# Patient Record
Sex: FEMALE | Race: WHITE | Employment: FULL TIME | URBAN - NONMETROPOLITAN AREA
[De-identification: names, ages, dates, MRNs, and addresses within clinical notes are randomized per-mention and may not be internally consistent; named-entity substitution may affect disease eponyms.]

---

## 2017-02-27 RX ORDER — MONTELUKAST SODIUM 10 MG/1
TABLET ORAL
Qty: 30 TABLET | Refills: 5 | Status: SHIPPED | OUTPATIENT
Start: 2017-02-27 | End: 2017-09-14 | Stop reason: SDUPTHER

## 2017-04-11 ENCOUNTER — HOSPITAL ENCOUNTER (OUTPATIENT)
Dept: LAB | Age: 47
Discharge: HOME | End: 2017-04-11
Attending: INTERNAL MEDICINE | Admitting: INTERNAL MEDICINE

## 2017-04-11 ENCOUNTER — OFFICE VISIT (OUTPATIENT)
Dept: INTERNAL MEDICINE CLINIC | Age: 47
End: 2017-04-11

## 2017-04-11 VITALS
HEIGHT: 65 IN | RESPIRATION RATE: 16 BRPM | WEIGHT: 151.8 LBS | SYSTOLIC BLOOD PRESSURE: 138 MMHG | BODY MASS INDEX: 25.29 KG/M2 | TEMPERATURE: 98.1 F | OXYGEN SATURATION: 99 % | HEART RATE: 68 BPM | DIASTOLIC BLOOD PRESSURE: 88 MMHG

## 2017-04-11 DIAGNOSIS — F41.9 ANXIETY: ICD-10-CM

## 2017-04-11 DIAGNOSIS — J45.991 COUGH VARIANT ASTHMA: Primary | ICD-10-CM

## 2017-04-11 DIAGNOSIS — C73 THYROID CA (HCC): ICD-10-CM

## 2017-04-11 LAB
ANION GAP SERPL CALCULATED.3IONS-SCNC: 14 MMOL/L (ref 4–16)
BASOPHILS ABSOLUTE: 0 K/CU MM
BASOPHILS RELATIVE PERCENT: 0.5 % (ref 0–1)
BUN BLDV-MCNC: 15 MG/DL (ref 6–23)
CALCIUM SERPL-MCNC: 8.9 MG/DL (ref 8.3–10.6)
CHLORIDE BLD-SCNC: 102 MMOL/L (ref 99–110)
CO2: 23 MMOL/L (ref 21–32)
CREAT SERPL-MCNC: 0.9 MG/DL (ref 0.6–1.1)
DIFFERENTIAL TYPE: ABNORMAL
EOSINOPHILS ABSOLUTE: 0.1 K/CU MM
EOSINOPHILS RELATIVE PERCENT: 1.6 % (ref 0–3)
GFR AFRICAN AMERICAN: >60 ML/MIN/1.73M2
GFR NON-AFRICAN AMERICAN: >60 ML/MIN/1.73M2
GLUCOSE BLD-MCNC: 105 MG/DL (ref 70–140)
HCT VFR BLD CALC: 40.5 % (ref 37–47)
HEMOGLOBIN: 13.9 GM/DL (ref 12.5–16)
IMMATURE NEUTROPHIL %: 0.5 % (ref 0–0.43)
LYMPHOCYTES ABSOLUTE: 1.1 K/CU MM
LYMPHOCYTES RELATIVE PERCENT: 25.1 % (ref 24–44)
MCH RBC QN AUTO: 32.3 PG (ref 27–31)
MCHC RBC AUTO-ENTMCNC: 34.3 % (ref 32–36)
MCV RBC AUTO: 94.2 FL (ref 78–100)
MONOCYTES ABSOLUTE: 0.4 K/CU MM
MONOCYTES RELATIVE PERCENT: 9.8 % (ref 0–4)
PDW BLD-RTO: 12.2 % (ref 11.7–14.9)
PLATELET # BLD: 161 K/CU MM (ref 140–440)
PMV BLD AUTO: 9.9 FL (ref 7.5–11.1)
POTASSIUM SERPL-SCNC: 4.1 MMOL/L (ref 3.5–5.1)
RBC # BLD: 4.3 M/CU MM (ref 4.2–5.4)
SEGMENTED NEUTROPHILS ABSOLUTE COUNT: 2.7 K/CU MM
SEGMENTED NEUTROPHILS RELATIVE PERCENT: 62.5 % (ref 36–66)
SODIUM BLD-SCNC: 139 MMOL/L (ref 135–145)
TOTAL IMMATURE NEUTOROPHIL: 0.02 K/CU MM
WBC # BLD: 4.4 K/CU MM (ref 4–10.5)

## 2017-04-11 PROCEDURE — 99213 OFFICE O/P EST LOW 20 MIN: CPT | Performed by: INTERNAL MEDICINE

## 2017-04-11 ASSESSMENT — PATIENT HEALTH QUESTIONNAIRE - PHQ9
SUM OF ALL RESPONSES TO PHQ QUESTIONS 1-9: 0
SUM OF ALL RESPONSES TO PHQ9 QUESTIONS 1 & 2: 0
1. LITTLE INTEREST OR PLEASURE IN DOING THINGS: 0
2. FEELING DOWN, DEPRESSED OR HOPELESS: 0

## 2017-04-11 ASSESSMENT — ENCOUNTER SYMPTOMS
RESPIRATORY NEGATIVE: 1
HEARTBURN: 0
GASTROINTESTINAL NEGATIVE: 1
EYES NEGATIVE: 1
NAUSEA: 0

## 2017-07-12 ENCOUNTER — OFFICE VISIT (OUTPATIENT)
Dept: INTERNAL MEDICINE CLINIC | Age: 47
End: 2017-07-12

## 2017-07-12 VITALS
DIASTOLIC BLOOD PRESSURE: 74 MMHG | HEIGHT: 65 IN | RESPIRATION RATE: 15 BRPM | HEART RATE: 84 BPM | TEMPERATURE: 98.3 F | OXYGEN SATURATION: 98 % | WEIGHT: 149.8 LBS | SYSTOLIC BLOOD PRESSURE: 124 MMHG | BODY MASS INDEX: 24.96 KG/M2

## 2017-07-12 DIAGNOSIS — C73 THYROID CA (HCC): ICD-10-CM

## 2017-07-12 DIAGNOSIS — N92.6 IRREGULAR MENSES: ICD-10-CM

## 2017-07-12 DIAGNOSIS — J45.991 COUGH VARIANT ASTHMA: Primary | ICD-10-CM

## 2017-07-12 PROCEDURE — 99213 OFFICE O/P EST LOW 20 MIN: CPT | Performed by: INTERNAL MEDICINE

## 2017-08-08 ASSESSMENT — ENCOUNTER SYMPTOMS
EYES NEGATIVE: 1
GASTROINTESTINAL NEGATIVE: 1

## 2017-09-14 RX ORDER — MONTELUKAST SODIUM 10 MG/1
TABLET ORAL
Qty: 30 TABLET | Refills: 5 | Status: SHIPPED | OUTPATIENT
Start: 2017-09-14 | End: 2018-03-04 | Stop reason: SDUPTHER

## 2017-10-27 ENCOUNTER — HOSPITAL ENCOUNTER (OUTPATIENT)
Dept: LAB | Age: 47
Discharge: HOME | End: 2017-10-27
Attending: INTERNAL MEDICINE | Admitting: INTERNAL MEDICINE

## 2017-10-27 LAB
T4 FREE: 1.69 NG/DL (ref 0.9–1.8)
TSH HIGH SENSITIVITY: 1.04 UIU/ML (ref 0.27–4.2)

## 2017-10-28 LAB
ANTITHYROGLOBULIN AB: <0.9
THYROGLOBULIN BY LC-MS/MS, SERUM/PLASMA: ABNORMAL
THYROGLOBULIN: 0.1

## 2018-01-11 ENCOUNTER — OFFICE VISIT (OUTPATIENT)
Dept: INTERNAL MEDICINE CLINIC | Age: 48
End: 2018-01-11

## 2018-01-11 VITALS
OXYGEN SATURATION: 98 % | RESPIRATION RATE: 16 BRPM | DIASTOLIC BLOOD PRESSURE: 100 MMHG | TEMPERATURE: 98 F | SYSTOLIC BLOOD PRESSURE: 144 MMHG | WEIGHT: 158.4 LBS | BODY MASS INDEX: 26.36 KG/M2 | HEART RATE: 78 BPM

## 2018-01-11 DIAGNOSIS — F32.A DEPRESSION, UNSPECIFIED DEPRESSION TYPE: ICD-10-CM

## 2018-01-11 DIAGNOSIS — J45.991 COUGH VARIANT ASTHMA: Primary | ICD-10-CM

## 2018-01-11 DIAGNOSIS — C73 THYROID CA (HCC): ICD-10-CM

## 2018-01-11 DIAGNOSIS — R03.0 ELEVATED BP WITHOUT DIAGNOSIS OF HYPERTENSION: ICD-10-CM

## 2018-01-11 PROCEDURE — 99213 OFFICE O/P EST LOW 20 MIN: CPT | Performed by: INTERNAL MEDICINE

## 2018-01-11 RX ORDER — PAROXETINE 10 MG/1
10 TABLET, FILM COATED ORAL DAILY
Qty: 30 TABLET | Refills: 1 | Status: SHIPPED | OUTPATIENT
Start: 2018-01-11 | End: 2018-03-23

## 2018-01-11 ASSESSMENT — ENCOUNTER SYMPTOMS
COUGH: 0
SHORTNESS OF BREATH: 0
EYES NEGATIVE: 1
GASTROINTESTINAL NEGATIVE: 1
HEARTBURN: 0
RESPIRATORY NEGATIVE: 1
VOMITING: 0
NAUSEA: 0

## 2018-01-11 NOTE — PROGRESS NOTES
Jere Jeans  Patient's  is 1970  Seen in office on 2018      SUBJECTIVE:  Yanelis Orta is a 49 y. o.year old female presents today   Chief Complaint   Patient presents with    6 Month Follow-Up     asthma    Migraine      had 4 this week     Pt is here for f/u of asthma  Pt states her breathing is good. Pt states her father  in   Since then she is emotional.  She had increasing headache. She had migraine headache when she was pregnant, when her step mother  and now since his dad passed away. She had bronchitis recently and was given steroid by  last Wednesday. Pt has seen endocrinologist and thyroid test are normal  Pt has grief and having crying spell. Taking medications regularly. No side effects noted. Review of Systems   Constitutional: Negative. Negative for chills, fever and weight loss. HENT: Negative. Eyes: Negative. Respiratory: Negative. Negative for cough and shortness of breath. Cardiovascular: Negative. Negative for chest pain, palpitations and leg swelling. Gastrointestinal: Negative. Negative for heartburn, nausea and vomiting. Genitourinary: Negative. Negative for dysuria and hematuria. Musculoskeletal: Negative. Skin: Negative. Neurological: Negative. Endo/Heme/Allergies: Negative. Psychiatric/Behavioral: Negative. OBJECTIVE: /80   Pulse 78   Temp 98 °F (36.7 °C)   Resp 16   Wt 158 lb 6.4 oz (71.8 kg)   LMP 2018   SpO2 98%   Breastfeeding? No   BMI 26.36 kg/m²     Wt Readings from Last 3 Encounters:   18 158 lb 6.4 oz (71.8 kg)   17 149 lb 12.8 oz (67.9 kg)   17 151 lb 12.8 oz (68.9 kg)      GENERAL:  Alert, oriented, pleasant, in no apparent distress. HEENT:  Conjunctiva pink, no scleral icterus. ENT clear. NECK:  Supple. No jugular venous distention noted. No masses felt,  CARDIOVASCULAR:  Normal S1 and S2    PULMONARY:  No respiratory distress. No wheezes or rales. ABDOMEN:  Soft and non-tender,no masses  or organomegaly. EXTREMITIES:  No cyanosis, clubbing, or significant edema. SKIN: Skin is warm and dry. NEUROLOGICAL:  Cranial nerves II through XII are grossly intact. IMPRESSION:    Encounter Diagnoses   Name Primary?  Cough variant asthma Yes    Thyroid ca ([facility] [address]     Elevated BP without diagnosis of hypertension     Depression, unspecified depression type        ASSESSMENT/PLAN:    1. Cough variant asthma  Patient has asthma that is stable. Continue Pulmicort, albuterol and Singulair    2. Thyroid ca [facility])  Patient sees endocrinologist at [facility]    3. Elevated BP without diagnosis of hypertension  Patient to follow a low-salt diet and exercise and recheck the blood pressure    4. Depression, unspecified depression type  Pt has depression. Will order paxil 10 mg by mouth daily. Patient is reluctant to take it. Advised patient to go for some counseling that also she declined. If she does not improve she is planning to take Paxil. Return to office in 4 weeks          Mediations reviewed with the patient. Continue current medications. Appropriate prescriptions are addressed. After visit eliezery provided. Follow up as directed sooner if needed. Questions answered and patient verbalizes understanding. Call for any problems, questions, or concerns.        No Known Allergies  Current Outpatient Prescriptions   Medication Sig Dispense Refill    PROAIR  (90 Base) MCG/ACT inhaler INHALE 2 PUFFS INTO THE LUNGS EVERY 6 HOURS AS NEEDED FOR WHEEZING 1 Inhaler 5    montelukast (SINGULAIR) 10 MG tablet TAKE 1 TABLET BY MOUTH NIGHTLY 30 tablet 5    beclomethasone (QVAR) 80 MCG/ACT inhaler Inhale 1 puff into the lungs 2 times daily 1 Inhaler 3    levothyroxine (SYNTHROID) 125 MCG tablet Take 125 mcg by mouth Daily Takes 1.5 tab on Sunday only,  1 tab all other days      calcium carbonate (OSCAL) 500 MG TABS tablet Take 500 mg by mouth daily     

## 2018-02-26 NOTE — TELEPHONE ENCOUNTER
[facility] pharmacy requesting change to [facility] Redihaler

## 2018-03-05 RX ORDER — MONTELUKAST SODIUM 10 MG/1
TABLET ORAL
Qty: 30 TABLET | Refills: 5 | Status: SHIPPED | OUTPATIENT
Start: 2018-03-05 | End: 2018-09-24 | Stop reason: SDUPTHER

## 2018-03-23 ENCOUNTER — OFFICE VISIT (OUTPATIENT)
Dept: INTERNAL MEDICINE CLINIC | Age: 48
End: 2018-03-23

## 2018-03-23 VITALS
OXYGEN SATURATION: 98 % | HEART RATE: 76 BPM | HEIGHT: 65 IN | DIASTOLIC BLOOD PRESSURE: 78 MMHG | BODY MASS INDEX: 26.29 KG/M2 | TEMPERATURE: 98 F | WEIGHT: 157.8 LBS | RESPIRATION RATE: 16 BRPM | SYSTOLIC BLOOD PRESSURE: 118 MMHG

## 2018-03-23 DIAGNOSIS — J45.991 COUGH VARIANT ASTHMA: ICD-10-CM

## 2018-03-23 DIAGNOSIS — C73 THYROID CA (HCC): ICD-10-CM

## 2018-03-23 DIAGNOSIS — G47.00 INSOMNIA, UNSPECIFIED TYPE: ICD-10-CM

## 2018-03-23 DIAGNOSIS — F43.22 ADJUSTMENT DISORDER WITH ANXIOUS MOOD: Primary | ICD-10-CM

## 2018-03-23 PROCEDURE — 99213 OFFICE O/P EST LOW 20 MIN: CPT | Performed by: INTERNAL MEDICINE

## 2018-03-23 ASSESSMENT — ENCOUNTER SYMPTOMS
RESPIRATORY NEGATIVE: 1
EYES NEGATIVE: 1
GASTROINTESTINAL NEGATIVE: 1

## 2018-03-23 NOTE — PROGRESS NOTES
anxious mood Yes    Cough variant asthma     Thyroid ca ([facility]     Insomnia, unspecified type        ASSESSMENT/PLAN:    1. Depression in control with out any med. 2. Insomnia : take melatonin 1 mg daily  3. Asthma is stable. Continue singulair and Qvar daily and albuterol prn   4. HA : resolved. 5. BP is normal.    Mediations reviewed with the patient. Continue current medications. Appropriate prescriptions are addressed. After visit summery provided. Follow up as directed sooner if needed. Questions answered and patient verbalizes understanding. Call for any problems, questions, or concerns. No Known Allergies  Current Outpatient Prescriptions   Medication Sig Dispense Refill    montelukast (SINGULAIR) 10 MG tablet TAKE 1 TABLET BY MOUTH NIGHTLY 30 tablet 5    beclomethasone (QVAR) 80 MCG/ACT inhaler Inhale 1 puff into the lungs 2 times daily Please dispense the redihaler 1 Inhaler 3    PROAIR  (90 Base) MCG/ACT inhaler INHALE 2 PUFFS INTO THE LUNGS EVERY 6 HOURS AS NEEDED FOR WHEEZING 1 Inhaler 5    levothyroxine (SYNTHROID) 125 MCG tablet Take 125 mcg by mouth Daily Takes 1.5 tab on Sunday only,  1 tab all other days      calcium carbonate (OSCAL) 500 MG TABS tablet Take 500 mg by mouth daily      MULTIPLE VITAMIN PO Take 1 tablet by mouth daily. No current facility-administered medications for this visit. Past Medical History:   Diagnosis Date    Cough variant asthma     Depression     resolved    Migraines     Dr. Maye Fleming 12-     Pulmonary nodules     f/u by Dr. Genoveva Hill. Benign. Last CT 1/12 stable middle lobe nodules.  Thyroid ca ([facility] 12/15/2015    Had total thyroidectomy by Dr Lydia Kc On synthroid. Managed by Dr Cony Richmond.  Thyroid nodule     seen Dr. Cony Richmond, bx left nodule was neg last seen 6/13 US q year Nodule increased in size per pt. He is planning to do bx next year Had thyroid nodule biopsy on 8/4/2015.   Nodule was 2.6 cm left inferior nodule

## 2018-05-11 ENCOUNTER — OFFICE VISIT (OUTPATIENT)
Dept: INTERNAL MEDICINE CLINIC | Age: 48
End: 2018-05-11

## 2018-05-11 VITALS
WEIGHT: 155.2 LBS | OXYGEN SATURATION: 99 % | SYSTOLIC BLOOD PRESSURE: 146 MMHG | BODY MASS INDEX: 25.83 KG/M2 | HEART RATE: 92 BPM | TEMPERATURE: 97.9 F | DIASTOLIC BLOOD PRESSURE: 94 MMHG | RESPIRATION RATE: 16 BRPM

## 2018-05-11 DIAGNOSIS — C73 THYROID CA (HCC): ICD-10-CM

## 2018-05-11 DIAGNOSIS — J45.991 COUGH VARIANT ASTHMA: ICD-10-CM

## 2018-05-11 DIAGNOSIS — I10 MILD HYPERTENSION: Primary | ICD-10-CM

## 2018-05-11 PROCEDURE — 99213 OFFICE O/P EST LOW 20 MIN: CPT | Performed by: INTERNAL MEDICINE

## 2018-05-11 RX ORDER — AMLODIPINE BESYLATE 2.5 MG/1
5 TABLET ORAL DAILY
Qty: 30 TABLET | Refills: 1 | Status: SHIPPED | OUTPATIENT
Start: 2018-05-11 | End: 2018-06-11 | Stop reason: DRUGHIGH

## 2018-05-11 ASSESSMENT — PATIENT HEALTH QUESTIONNAIRE - PHQ9
2. FEELING DOWN, DEPRESSED OR HOPELESS: 0
1. LITTLE INTEREST OR PLEASURE IN DOING THINGS: 0
SUM OF ALL RESPONSES TO PHQ9 QUESTIONS 1 & 2: 0
SUM OF ALL RESPONSES TO PHQ QUESTIONS 1-9: 0

## 2018-05-29 RX ORDER — AMLODIPINE BESYLATE 2.5 MG/1
TABLET ORAL
Qty: 30 TABLET | Refills: 1 | OUTPATIENT
Start: 2018-05-29

## 2018-06-11 RX ORDER — AMLODIPINE BESYLATE 5 MG/1
5 TABLET ORAL DAILY
COMMUNITY
End: 2018-06-11 | Stop reason: SDUPTHER

## 2018-06-11 RX ORDER — AMLODIPINE BESYLATE 5 MG/1
5 TABLET ORAL DAILY
Qty: 30 TABLET | Refills: 5 | Status: SHIPPED | OUTPATIENT
Start: 2018-06-11 | End: 2018-06-26 | Stop reason: ALTCHOICE

## 2018-06-11 RX ORDER — AMLODIPINE BESYLATE 2.5 MG/1
TABLET ORAL
Qty: 60 TABLET | Refills: 3 | OUTPATIENT
Start: 2018-06-11

## 2018-06-26 ENCOUNTER — OFFICE VISIT (OUTPATIENT)
Dept: INTERNAL MEDICINE CLINIC | Age: 48
End: 2018-06-26

## 2018-06-26 VITALS
DIASTOLIC BLOOD PRESSURE: 88 MMHG | SYSTOLIC BLOOD PRESSURE: 126 MMHG | HEIGHT: 65 IN | RESPIRATION RATE: 16 BRPM | BODY MASS INDEX: 25.49 KG/M2 | TEMPERATURE: 98.6 F | WEIGHT: 153 LBS | HEART RATE: 100 BPM | OXYGEN SATURATION: 98 %

## 2018-06-26 DIAGNOSIS — C73 THYROID CA (HCC): ICD-10-CM

## 2018-06-26 DIAGNOSIS — F43.22 ADJUSTMENT DISORDER WITH ANXIOUS MOOD: ICD-10-CM

## 2018-06-26 DIAGNOSIS — R00.0 TACHYCARDIA: ICD-10-CM

## 2018-06-26 DIAGNOSIS — J45.991 COUGH VARIANT ASTHMA: ICD-10-CM

## 2018-06-26 DIAGNOSIS — I10 ESSENTIAL HYPERTENSION: Primary | ICD-10-CM

## 2018-06-26 PROCEDURE — 99213 OFFICE O/P EST LOW 20 MIN: CPT | Performed by: INTERNAL MEDICINE

## 2018-06-26 RX ORDER — DILTIAZEM HYDROCHLORIDE 180 MG/1
180 CAPSULE, COATED, EXTENDED RELEASE ORAL DAILY
Qty: 30 CAPSULE | Refills: 3 | Status: SHIPPED | OUTPATIENT
Start: 2018-06-26 | End: 2018-09-24 | Stop reason: SDUPTHER

## 2018-06-26 ASSESSMENT — ENCOUNTER SYMPTOMS
GASTROINTESTINAL NEGATIVE: 1
RESPIRATORY NEGATIVE: 1
EYES NEGATIVE: 1

## 2018-06-27 ENCOUNTER — HOSPITAL ENCOUNTER (OUTPATIENT)
Dept: LAB | Age: 48
Discharge: HOME | End: 2018-06-27
Attending: INTERNAL MEDICINE | Admitting: INTERNAL MEDICINE

## 2018-06-27 LAB
ANION GAP SERPL CALCULATED.3IONS-SCNC: 12 MMOL/L (ref 4–16)
BUN BLDV-MCNC: 14 MG/DL (ref 6–23)
CALCIUM SERPL-MCNC: 9.1 MG/DL (ref 8.3–10.6)
CHLORIDE BLD-SCNC: 101 MMOL/L (ref 99–110)
CO2: 26 MMOL/L (ref 21–32)
CREAT SERPL-MCNC: 0.9 MG/DL (ref 0.6–1.1)
GFR AFRICAN AMERICAN: >60 ML/MIN/1.73M2
GFR NON-AFRICAN AMERICAN: >60 ML/MIN/1.73M2
GLUCOSE FASTING: 108 MG/DL (ref 70–99)
POTASSIUM SERPL-SCNC: 4.4 MMOL/L (ref 3.5–5.1)
SODIUM BLD-SCNC: 139 MMOL/L (ref 135–145)
T4 FREE: 1.48 NG/DL (ref 0.9–1.8)
TSH HIGH SENSITIVITY: 1.1 UIU/ML (ref 0.27–4.2)

## 2018-09-24 ENCOUNTER — OFFICE VISIT (OUTPATIENT)
Dept: INTERNAL MEDICINE CLINIC | Age: 48
End: 2018-09-24
Payer: COMMERCIAL

## 2018-09-24 VITALS
BODY MASS INDEX: 25.76 KG/M2 | SYSTOLIC BLOOD PRESSURE: 142 MMHG | DIASTOLIC BLOOD PRESSURE: 92 MMHG | WEIGHT: 154.8 LBS | HEART RATE: 95 BPM | TEMPERATURE: 98.4 F | RESPIRATION RATE: 10 BRPM | OXYGEN SATURATION: 98 %

## 2018-09-24 DIAGNOSIS — F43.22 ADJUSTMENT DISORDER WITH ANXIOUS MOOD: ICD-10-CM

## 2018-09-24 DIAGNOSIS — C73 THYROID CA (HCC): ICD-10-CM

## 2018-09-24 DIAGNOSIS — I10 ESSENTIAL HYPERTENSION: Primary | ICD-10-CM

## 2018-09-24 DIAGNOSIS — J45.991 COUGH VARIANT ASTHMA: ICD-10-CM

## 2018-09-24 PROCEDURE — 99213 OFFICE O/P EST LOW 20 MIN: CPT | Performed by: INTERNAL MEDICINE

## 2018-09-24 RX ORDER — DILTIAZEM HYDROCHLORIDE 240 MG/1
240 CAPSULE, COATED, EXTENDED RELEASE ORAL DAILY
Qty: 30 CAPSULE | Refills: 5 | Status: SHIPPED | OUTPATIENT
Start: 2018-09-24 | End: 2018-12-18 | Stop reason: DRUGHIGH

## 2018-09-24 RX ORDER — ALBUTEROL SULFATE 90 UG/1
2 AEROSOL, METERED RESPIRATORY (INHALATION) EVERY 6 HOURS PRN
Qty: 1 INHALER | Refills: 5 | Status: SHIPPED | OUTPATIENT
Start: 2018-09-24 | End: 2019-10-20 | Stop reason: SDUPTHER

## 2018-09-24 RX ORDER — MONTELUKAST SODIUM 10 MG/1
TABLET ORAL
Qty: 30 TABLET | Refills: 5 | Status: SHIPPED | OUTPATIENT
Start: 2018-09-24 | End: 2019-03-20 | Stop reason: SDUPTHER

## 2018-09-24 ASSESSMENT — ENCOUNTER SYMPTOMS
RESPIRATORY NEGATIVE: 1
EYES NEGATIVE: 1
GASTROINTESTINAL NEGATIVE: 1

## 2018-11-01 ENCOUNTER — HOSPITAL ENCOUNTER (OUTPATIENT)
Age: 48
Discharge: HOME | End: 2018-11-01
Payer: COMMERCIAL

## 2018-11-01 LAB
T4 FREE: 1.55 NG/DL (ref 0.9–1.8)
TSH HIGH SENSITIVITY: 1.15 UIU/ML (ref 0.27–4.2)

## 2018-11-01 PROCEDURE — 86800 THYROGLOBULIN ANTIBODY: CPT

## 2018-11-01 PROCEDURE — 84432 ASSAY OF THYROGLOBULIN: CPT

## 2018-11-01 PROCEDURE — 36415 COLL VENOUS BLD VENIPUNCTURE: CPT

## 2018-11-01 PROCEDURE — 84443 ASSAY THYROID STIM HORMONE: CPT

## 2018-11-01 PROCEDURE — 84439 ASSAY OF FREE THYROXINE: CPT

## 2018-11-03 LAB
ANTITHYROGLOBULIN AB: <0.9
THYROGLOBULIN BY LC-MS/MS, SERUM/PLASMA: ABNORMAL
THYROGLOBULIN: <0.1

## 2018-12-04 ENCOUNTER — TELEPHONE (OUTPATIENT)
Dept: INTERNAL MEDICINE CLINIC | Age: 48
End: 2018-12-04

## 2018-12-04 VITALS — SYSTOLIC BLOOD PRESSURE: 140 MMHG | DIASTOLIC BLOOD PRESSURE: 92 MMHG

## 2018-12-05 NOTE — TELEPHONE ENCOUNTER
Continue medications and follow low salt diet   Bring BP reading on RTO   Change patient's appointment as I am out of town at her appointment time.

## 2018-12-18 ENCOUNTER — OFFICE VISIT (OUTPATIENT)
Dept: INTERNAL MEDICINE CLINIC | Age: 48
End: 2018-12-18
Payer: COMMERCIAL

## 2018-12-18 VITALS
DIASTOLIC BLOOD PRESSURE: 82 MMHG | WEIGHT: 156.6 LBS | OXYGEN SATURATION: 99 % | TEMPERATURE: 98.5 F | HEART RATE: 74 BPM | BODY MASS INDEX: 26.06 KG/M2 | SYSTOLIC BLOOD PRESSURE: 118 MMHG

## 2018-12-18 DIAGNOSIS — R00.0 TACHYCARDIA: ICD-10-CM

## 2018-12-18 DIAGNOSIS — I10 ESSENTIAL HYPERTENSION: Primary | ICD-10-CM

## 2018-12-18 DIAGNOSIS — J45.991 COUGH VARIANT ASTHMA: ICD-10-CM

## 2018-12-18 DIAGNOSIS — C73 THYROID CA (HCC): ICD-10-CM

## 2018-12-18 PROCEDURE — 99213 OFFICE O/P EST LOW 20 MIN: CPT | Performed by: INTERNAL MEDICINE

## 2018-12-18 RX ORDER — DILTIAZEM HYDROCHLORIDE 120 MG/1
120 CAPSULE, COATED, EXTENDED RELEASE ORAL 2 TIMES DAILY
Qty: 60 CAPSULE | Refills: 5 | Status: SHIPPED | OUTPATIENT
Start: 2018-12-18 | End: 2019-03-20 | Stop reason: SDUPTHER

## 2018-12-18 NOTE — PROGRESS NOTES
Tachycardia        ASSESSMENT/PLAN:    1. Essential hypertension  Patient blood pressure is elevated in morning but is normal in the evening. Patient is taking diltiazem 240 mg once a day  We will split the dose to 120 mg twice a day and see if the blood pressure is normal in the morning. Continue low-salt diet    2. Thyroid ca [facility])  Patient sees endocrinologist in [place]. She is on Synthroid    3. Cough variant asthma  Asthma is stable. Patient uses inhaler when necessary and also Singulair  She is on Qvar on a daily basis    4. Tachycardia  Tachycardia stable and controlled with diltiazem    5. Small cyst in the left forearm. Feels like a small cyst.  If it grows patient should see surgeon to be removed. Patient will notify us. No orders of the defined types were placed in this encounter. Mediations reviewed with the patient. Continue current medications. Appropriate prescriptions are addressed. After visit summeryprovided. Follow up as directed sooner if needed. Questions answered and patient verbalizes understanding. Call for any problems, questions, or concerns. No Known Allergies  Current Outpatient Prescriptions   Medication Sig Dispense Refill    diltiazem (CARDIZEM CD) 240 MG extended release capsule Take 1 capsule by mouth daily 30 capsule 5    beclomethasone (QVAR) 80 MCG/ACT inhaler Inhale 1 puff into the lungs 2 times daily Please dispense the redihaler 1 Inhaler 3    montelukast (SINGULAIR) 10 MG tablet TAKE 1 TABLET BY MOUTH NIGHTLY 30 tablet 5    albuterol sulfate HFA (PROAIR HFA) 108 (90 Base) MCG/ACT inhaler Inhale 2 puffs into the lungs every 6 hours as needed for Wheezing 1 Inhaler 5    levothyroxine (SYNTHROID) 125 MCG tablet Take 125 mcg by mouth Daily Takes 1.5 tab on Sunday only,  1 tab all other days      calcium carbonate (OSCAL) 500 MG TABS tablet Take 500 mg by mouth daily      MULTIPLE VITAMIN PO Take 1 tablet by mouth daily.        No current

## 2019-03-20 ENCOUNTER — HOSPITAL ENCOUNTER (OUTPATIENT)
Dept: GENERAL RADIOLOGY | Age: 49
Discharge: HOME | End: 2019-03-20
Payer: COMMERCIAL

## 2019-03-20 ENCOUNTER — HOSPITAL ENCOUNTER (OUTPATIENT)
Age: 49
Discharge: HOME | End: 2019-03-20
Payer: COMMERCIAL

## 2019-03-20 ENCOUNTER — OFFICE VISIT (OUTPATIENT)
Dept: INTERNAL MEDICINE CLINIC | Age: 49
End: 2019-03-20
Payer: COMMERCIAL

## 2019-03-20 VITALS
WEIGHT: 156.8 LBS | BODY MASS INDEX: 26.09 KG/M2 | DIASTOLIC BLOOD PRESSURE: 79 MMHG | SYSTOLIC BLOOD PRESSURE: 118 MMHG | OXYGEN SATURATION: 99 % | HEART RATE: 75 BPM

## 2019-03-20 DIAGNOSIS — M89.272 OTHER DISORDERS OF BONE DEVELOPMENT AND GROWTH, LEFT ANKLE AND FOOT: ICD-10-CM

## 2019-03-20 DIAGNOSIS — I10 ESSENTIAL HYPERTENSION: Primary | ICD-10-CM

## 2019-03-20 DIAGNOSIS — C73 THYROID CA (HCC): ICD-10-CM

## 2019-03-20 DIAGNOSIS — J45.991 COUGH VARIANT ASTHMA: ICD-10-CM

## 2019-03-20 PROCEDURE — 99213 OFFICE O/P EST LOW 20 MIN: CPT | Performed by: INTERNAL MEDICINE

## 2019-03-20 PROCEDURE — 73630 X-RAY EXAM OF FOOT: CPT

## 2019-03-20 RX ORDER — DILTIAZEM HYDROCHLORIDE 120 MG/1
120 CAPSULE, COATED, EXTENDED RELEASE ORAL 2 TIMES DAILY
Qty: 180 CAPSULE | Refills: 1 | Status: SHIPPED | OUTPATIENT
Start: 2019-03-20 | End: 2019-06-20 | Stop reason: SDUPTHER

## 2019-03-20 RX ORDER — MONTELUKAST SODIUM 10 MG/1
TABLET ORAL
Qty: 90 TABLET | Refills: 1 | Status: SHIPPED | OUTPATIENT
Start: 2019-03-20 | End: 2019-06-20 | Stop reason: SDUPTHER

## 2019-03-20 ASSESSMENT — PATIENT HEALTH QUESTIONNAIRE - PHQ9
SUM OF ALL RESPONSES TO PHQ QUESTIONS 1-9: 0
SUM OF ALL RESPONSES TO PHQ9 QUESTIONS 1 & 2: 0
2. FEELING DOWN, DEPRESSED OR HOPELESS: 0
1. LITTLE INTEREST OR PLEASURE IN DOING THINGS: 0
SUM OF ALL RESPONSES TO PHQ QUESTIONS 1-9: 0

## 2019-03-20 ASSESSMENT — ENCOUNTER SYMPTOMS
RESPIRATORY NEGATIVE: 1
EYES NEGATIVE: 1
GASTROINTESTINAL NEGATIVE: 1
ALLERGIC/IMMUNOLOGIC NEGATIVE: 1

## 2019-06-20 ENCOUNTER — OFFICE VISIT (OUTPATIENT)
Dept: INTERNAL MEDICINE CLINIC | Age: 49
End: 2019-06-20
Payer: COMMERCIAL

## 2019-06-20 VITALS
HEART RATE: 84 BPM | WEIGHT: 154.8 LBS | BODY MASS INDEX: 25.76 KG/M2 | SYSTOLIC BLOOD PRESSURE: 122 MMHG | TEMPERATURE: 98.6 F | DIASTOLIC BLOOD PRESSURE: 80 MMHG | OXYGEN SATURATION: 96 % | RESPIRATION RATE: 16 BRPM

## 2019-06-20 DIAGNOSIS — R00.0 TACHYCARDIA: ICD-10-CM

## 2019-06-20 DIAGNOSIS — Z11.4 SCREENING FOR HIV WITHOUT PRESENCE OF RISK FACTORS: ICD-10-CM

## 2019-06-20 DIAGNOSIS — C73 THYROID CA (HCC): ICD-10-CM

## 2019-06-20 DIAGNOSIS — I10 ESSENTIAL HYPERTENSION: Primary | ICD-10-CM

## 2019-06-20 DIAGNOSIS — Z12.31 VISIT FOR SCREENING MAMMOGRAM: ICD-10-CM

## 2019-06-20 DIAGNOSIS — J45.991 COUGH VARIANT ASTHMA: ICD-10-CM

## 2019-06-20 LAB
A/G RATIO: 1.9 (ref 1.1–2.2)
ALBUMIN SERPL-MCNC: 4.6 G/DL (ref 3.4–5)
ALP BLD-CCNC: 37 U/L (ref 40–129)
ALT SERPL-CCNC: 14 U/L (ref 10–40)
ANION GAP SERPL CALCULATED.3IONS-SCNC: 13 MMOL/L (ref 3–16)
AST SERPL-CCNC: 16 U/L (ref 15–37)
BASOPHILS ABSOLUTE: 0 K/UL (ref 0–0.2)
BASOPHILS RELATIVE PERCENT: 0.7 %
BILIRUB SERPL-MCNC: 0.4 MG/DL (ref 0–1)
BUN BLDV-MCNC: 18 MG/DL (ref 7–20)
CALCIUM SERPL-MCNC: 9.1 MG/DL (ref 8.3–10.6)
CHLORIDE BLD-SCNC: 106 MMOL/L (ref 99–110)
CO2: 21 MMOL/L (ref 21–32)
CREAT SERPL-MCNC: 0.8 MG/DL (ref 0.6–1.1)
EOSINOPHILS ABSOLUTE: 0.1 K/UL (ref 0–0.6)
EOSINOPHILS RELATIVE PERCENT: 1.5 %
GFR AFRICAN AMERICAN: >60
GFR NON-AFRICAN AMERICAN: >60
GLOBULIN: 2.4 G/DL
GLUCOSE BLD-MCNC: 94 MG/DL (ref 70–99)
HCT VFR BLD CALC: 41 % (ref 36–48)
HEMOGLOBIN: 14.2 G/DL (ref 12–16)
LYMPHOCYTES ABSOLUTE: 1.1 K/UL (ref 1–5.1)
LYMPHOCYTES RELATIVE PERCENT: 22.9 %
MCH RBC QN AUTO: 33.1 PG (ref 26–34)
MCHC RBC AUTO-ENTMCNC: 34.7 G/DL (ref 31–36)
MCV RBC AUTO: 95.3 FL (ref 80–100)
MONOCYTES ABSOLUTE: 0.4 K/UL (ref 0–1.3)
MONOCYTES RELATIVE PERCENT: 8.1 %
NEUTROPHILS ABSOLUTE: 3.1 K/UL (ref 1.7–7.7)
NEUTROPHILS RELATIVE PERCENT: 66.8 %
PDW BLD-RTO: 13.2 % (ref 12.4–15.4)
PLATELET # BLD: 187 K/UL (ref 135–450)
PMV BLD AUTO: 8.9 FL (ref 5–10.5)
POTASSIUM SERPL-SCNC: 4.7 MMOL/L (ref 3.5–5.1)
RBC # BLD: 4.3 M/UL (ref 4–5.2)
SODIUM BLD-SCNC: 140 MMOL/L (ref 136–145)
TOTAL PROTEIN: 7 G/DL (ref 6.4–8.2)
WBC # BLD: 4.6 K/UL (ref 4–11)

## 2019-06-20 PROCEDURE — 99213 OFFICE O/P EST LOW 20 MIN: CPT | Performed by: INTERNAL MEDICINE

## 2019-06-20 PROCEDURE — 36415 COLL VENOUS BLD VENIPUNCTURE: CPT | Performed by: INTERNAL MEDICINE

## 2019-06-20 RX ORDER — SARECYCLINE HYDROCHLORIDE 100 MG/1
100 TABLET, COATED ORAL DAILY
Refills: 2 | COMMUNITY
Start: 2019-05-17 | End: 2020-09-23

## 2019-06-20 RX ORDER — DILTIAZEM HYDROCHLORIDE 120 MG/1
120 CAPSULE, COATED, EXTENDED RELEASE ORAL 2 TIMES DAILY
Qty: 180 CAPSULE | Refills: 1 | Status: SHIPPED | OUTPATIENT
Start: 2019-06-20 | End: 2020-03-18 | Stop reason: SDUPTHER

## 2019-06-20 RX ORDER — MONTELUKAST SODIUM 10 MG/1
TABLET ORAL
Qty: 90 TABLET | Refills: 1 | Status: SHIPPED | OUTPATIENT
Start: 2019-06-20 | End: 2020-03-18 | Stop reason: SDUPTHER

## 2019-06-20 RX ORDER — ERYTHROMYCIN AND BENZOYL PEROXIDE 30; 50 MG/G; MG/G
GEL TOPICAL
Refills: 3 | COMMUNITY
Start: 2019-05-07 | End: 2020-09-23 | Stop reason: ALTCHOICE

## 2019-06-20 ASSESSMENT — ENCOUNTER SYMPTOMS
GASTROINTESTINAL NEGATIVE: 1
RESPIRATORY NEGATIVE: 1
WHEEZING: 0
ALLERGIC/IMMUNOLOGIC NEGATIVE: 1
COUGH: 0
EYES NEGATIVE: 1

## 2019-06-20 NOTE — PROGRESS NOTES
Alivia Cruz  Patient's  is 1970  Seen in office on 2019      SUBJECTIVE:  Josué angel 51 y. o.year old female presents today   Chief Complaint   Patient presents with    Hypertension    Medication Refill     Patient is here for follow-up of hypertension and tachycardia. 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 Otherwise she is feeling good. She had a skin lesions on the neck and upper chest for which she went to dermatologist and diagnosed as folliculitis and was given doxycycline and some cream both of which she is still taking. The rash has improved. No chest pain. No shortness of breath. No cough or sputum production. No nausea, vomiting or diarrhea. Occasional palpitation. No dizziness and syncope or near syncope. Patient states she goes to gynecologist and gets Pap smear  She also had lipid screening done at work and she will bring the readings. She never had HIV screen done discussed with the patient and she is agreeable to get it done. Patient states she does not have any increased risk for that. Taking medications regularly. No side effects noted. Patient states anxiety and depression has resolved. Review of Systems   Constitutional: Negative. HENT: Negative. Eyes: Negative. Respiratory: Negative. Negative for cough and wheezing. Cardiovascular: Positive for palpitations. Negative for chest pain. Gastrointestinal: Negative. Endocrine: Negative. Genitourinary: Negative. Musculoskeletal: Negative. Skin: Negative. Allergic/Immunologic: Negative. Hematological: Negative. Psychiatric/Behavioral: Negative.         OBJECTIVE: /80   Pulse 84   Temp 98.6 °F (37 °C)   Resp 16   Wt 154 lb 12.8 oz (70.2 kg)   SpO2 96%   BMI 25.76 kg/m²     Wt Readings from Last 3 Encounters:   19 154 lb 12.8 oz (70.2 kg)   19 156 lb 12.8 oz (71.1 kg)   12/18/18 156 lb 9.6 oz (71 kg)      GENERAL: - Alert, oriented, pleasant, in no apparent distress. HEENT: - Conjunctiva pink, no scleral icterus. ENT clear. NECK: -Supple. No jugular venous distention noted. No masses felt,  CARDIOVASCULAR: - Normal S1 and S2    PULMONARY: - No respiratory distress. No wheezes or rales. ABDOMEN: - Soft and non-tender,no masses  ororganomegaly. EXTREMITIES: - No cyanosis, clubbing, or significant edema. SKIN: Skin is warm and dry. NEUROLOGICAL: - Cranial nerves II through XII are grossly intact. IMPRESSION:    Encounter Diagnoses   Name Primary?  Essential hypertension Yes    Thyroid ca ([facility] [address]     Tachycardia     Cough variant asthma     Screening for HIV without presence of risk factors        ASSESSMENT/PLAN:    Essential hypertension  Patient has hypertension that is controlled. Occasionally goes up high at work up to upper 384O systolic. Her heart rate is stable. Continue same Cardizem  mg twice a day    Tachycardia  Patient has history of tachycardia that is controlled with Cardizem    Thyroid ca [facility])  History of thyroid cancer patient was to endocrinologist in [address] She is stable and is taking Synthroid. She gets thyroid checked in [address] Cough variant asthma  Asthma is stable. Patient is taking albuterol, Singulair and Qvar. Return to office in 3-month  Orders Placed This Encounter   Procedures    CHICHI Screening Bilateral    HIV Screen    CBC Auto Differential    Comprehensive Metabolic Panel         Mediations reviewed with the patient. Continue current medications. Appropriate prescriptions are addressed. After visit summeryprovided. Follow up as directed sooner if needed. Questions answered and patient verbalizes understanding. Call for any problems, questions, or concerns.        No Known Allergies  Current Outpatient Medications   Medication Sig Dispense Refill    AKTIPAK 5-3 % PACK APPLY TO AREAS OF BUMPS ON CHEST AND BACK ONCE DAILY  3    SEYSARA 100 MG TABS Take 100 mg by mouth daily  2    montelukast (SINGULAIR) 10 MG tablet TAKE 1 TABLET BY MOUTH NIGHTLY 90 tablet 1    diltiazem (CARDIZEM CD) 120 MG extended release capsule Take 1 capsule by mouth 2 times daily 180 capsule 1    beclomethasone (QVAR) 80 MCG/ACT inhaler Inhale 1 puff into the lungs 2 times daily Please dispense the redihaler 1 Inhaler 3    albuterol sulfate HFA (PROAIR HFA) 108 (90 Base) MCG/ACT inhaler Inhale 2 puffs into the lungs every 6 hours as needed for Wheezing 1 Inhaler 5    levothyroxine (SYNTHROID) 125 MCG tablet Take 125 mcg by mouth Daily Takes 1.5 tab on Sunday only,  1 tab all other days      calcium carbonate (OSCAL) 500 MG TABS tablet Take 500 mg by mouth daily      MULTIPLE VITAMIN PO Take 1 tablet by mouth daily. No current facility-administered medications for this visit. Past Medical History:   Diagnosis Date    Cough variant asthma     Depression     resolved    Migraines     Dr. Mani Lozano 12-     Pulmonary nodules     f/u by Dr. Lian Duff. Benign. Last CT 1/12 stable middle lobe nodules.  Thyroid ca ([address] 12/15/2015    Had total thyroidectomy by Dr Teto Bedolla. On synthroid. Managed by Dr Misty Tamayo.  Thyroid nodule     seen Dr. Misty Tamayo, bx left nodule was neg last seen 6/13  q year Nodule increased in size per pt. He is planning to do bx next year Had thyroid nodule biopsy on 8/4/2015. Nodule was 2.6 cm left inferior nodule Pt underwent total thyroidectomy in 9/25/15     Past Surgical History:   Procedure Laterality Date    THYROID SURGERY  9-25-15    total thyroidectomy     Social History     Tobacco Use    Smoking status: Never Smoker    Smokeless tobacco: Never Used   Substance Use Topics    Alcohol use:  Yes     Alcohol/week: 0.0 oz       LAB REVIEW:  CBC:   Lab Results   Component Value Date    WBC 4.4 04/11/2017    HGB 13.9 04/11/2017    HCT 40.5 04/11/2017     04/11/2017 Lipids: No results found for: CHOL, TRIG, HDL, LDLCALC, LDLDIRECT, TRIGLYCFAST  Renal:   Lab Results   Component Value Date    BUN 14 06/27/2018    CREATININE 0.9 06/27/2018     06/27/2018    K 4.4 06/27/2018    ALT 12 09/11/2015    AST 15 09/11/2015    GLUCOSE 105 04/11/2017     PT/INR: No results found for: INR  A1C: No results found for: Umesh Henderson MD, 6/20/2019 , 9:21 AM

## 2019-06-20 NOTE — ASSESSMENT & PLAN NOTE
History of thyroid cancer patient was to endocrinologist in [place]. She is stable and is taking Synthroid. She gets thyroid checked in [place].

## 2019-09-20 ENCOUNTER — OFFICE VISIT (OUTPATIENT)
Dept: INTERNAL MEDICINE CLINIC | Age: 49
End: 2019-09-20
Payer: COMMERCIAL

## 2019-09-20 VITALS
HEART RATE: 88 BPM | DIASTOLIC BLOOD PRESSURE: 82 MMHG | SYSTOLIC BLOOD PRESSURE: 134 MMHG | WEIGHT: 153.2 LBS | BODY MASS INDEX: 25.49 KG/M2 | OXYGEN SATURATION: 98 % | TEMPERATURE: 98.4 F | RESPIRATION RATE: 16 BRPM

## 2019-09-20 DIAGNOSIS — J45.991 COUGH VARIANT ASTHMA: ICD-10-CM

## 2019-09-20 DIAGNOSIS — I10 ESSENTIAL HYPERTENSION: ICD-10-CM

## 2019-09-20 DIAGNOSIS — F43.22 ADJUSTMENT DISORDER WITH ANXIOUS MOOD: ICD-10-CM

## 2019-09-20 DIAGNOSIS — C73 THYROID CA (HCC): ICD-10-CM

## 2019-09-20 PROCEDURE — 99213 OFFICE O/P EST LOW 20 MIN: CPT | Performed by: INTERNAL MEDICINE

## 2019-09-20 RX ORDER — HYDROXYZINE PAMOATE 25 MG/1
25 CAPSULE ORAL DAILY PRN
Qty: 30 CAPSULE | Refills: 0 | Status: SHIPPED | OUTPATIENT
Start: 2019-09-20 | End: 2019-10-22 | Stop reason: SDUPTHER

## 2019-09-20 ASSESSMENT — ENCOUNTER SYMPTOMS
ALLERGIC/IMMUNOLOGIC NEGATIVE: 1
GASTROINTESTINAL NEGATIVE: 1
EYES NEGATIVE: 1
RESPIRATORY NEGATIVE: 1

## 2019-09-24 PROBLEM — R92.8 ABNORMAL MAMMOGRAM: Status: ACTIVE | Noted: 2019-09-24

## 2019-10-07 RX ORDER — DILTIAZEM HYDROCHLORIDE 120 MG/1
CAPSULE, COATED, EXTENDED RELEASE ORAL
Qty: 180 CAPSULE | Refills: 1 | Status: SHIPPED | OUTPATIENT
Start: 2019-10-07 | End: 2019-12-20 | Stop reason: SDUPTHER

## 2019-10-23 RX ORDER — HYDROXYZINE PAMOATE 25 MG/1
25 CAPSULE ORAL DAILY PRN
Qty: 30 CAPSULE | Refills: 0 | Status: SHIPPED | OUTPATIENT
Start: 2019-10-23 | End: 2019-11-16 | Stop reason: SDUPTHER

## 2019-11-04 ENCOUNTER — HOSPITAL ENCOUNTER (OUTPATIENT)
Age: 49
Discharge: HOME | End: 2019-11-04
Payer: COMMERCIAL

## 2019-11-04 LAB
T4 FREE: 1.68 NG/DL (ref 0.9–1.8)
TSH HIGH SENSITIVITY: 0.23 UIU/ML (ref 0.27–4.2)

## 2019-11-04 PROCEDURE — 86800 THYROGLOBULIN ANTIBODY: CPT

## 2019-11-04 PROCEDURE — 84443 ASSAY THYROID STIM HORMONE: CPT

## 2019-11-04 PROCEDURE — 84439 ASSAY OF FREE THYROXINE: CPT

## 2019-11-04 PROCEDURE — 84432 ASSAY OF THYROGLOBULIN: CPT

## 2019-11-04 PROCEDURE — 36415 COLL VENOUS BLD VENIPUNCTURE: CPT

## 2019-11-05 LAB
ANTITHYROGLOBULIN AB: <0.9 IU/ML (ref 0–4)
ANTITHYROGLOBULIN AB: ABNORMAL IU/ML (ref 0–4)
THYROGLOBULIN BY LC-MS/MS, SERUM/PLASMA: ABNORMAL NG/ML (ref 1.3–31.8)
THYROGLOBULIN BY LC-MS/MS, SERUM/PLASMA: ABNORMAL NG/ML (ref 1.3–31.8)
THYROGLOBULIN: <0.1 NG/ML (ref 1.3–31.8)
THYROGLOBULIN: ABNORMAL NG/ML (ref 1.3–31.8)

## 2019-11-18 RX ORDER — HYDROXYZINE PAMOATE 25 MG/1
25 CAPSULE ORAL DAILY PRN
Qty: 30 CAPSULE | Refills: 0 | Status: SHIPPED | OUTPATIENT
Start: 2019-11-18 | End: 2019-12-02

## 2019-12-16 RX ORDER — BECLOMETHASONE DIPROPIONATE HFA 80 UG/1
AEROSOL, METERED RESPIRATORY (INHALATION)
Qty: 10.6 G | Refills: 3 | Status: SHIPPED | OUTPATIENT
Start: 2019-12-16 | End: 2019-12-20 | Stop reason: SDUPTHER

## 2019-12-20 ENCOUNTER — OFFICE VISIT (OUTPATIENT)
Dept: INTERNAL MEDICINE CLINIC | Age: 49
End: 2019-12-20
Payer: COMMERCIAL

## 2019-12-20 VITALS
OXYGEN SATURATION: 98 % | TEMPERATURE: 98.9 F | BODY MASS INDEX: 26.23 KG/M2 | DIASTOLIC BLOOD PRESSURE: 74 MMHG | HEIGHT: 65 IN | RESPIRATION RATE: 16 BRPM | WEIGHT: 157.4 LBS | HEART RATE: 88 BPM | SYSTOLIC BLOOD PRESSURE: 122 MMHG

## 2019-12-20 DIAGNOSIS — C73 THYROID CA (HCC): ICD-10-CM

## 2019-12-20 DIAGNOSIS — R92.8 ABNORMAL MAMMOGRAM: ICD-10-CM

## 2019-12-20 DIAGNOSIS — I10 ESSENTIAL HYPERTENSION: ICD-10-CM

## 2019-12-20 PROCEDURE — 99213 OFFICE O/P EST LOW 20 MIN: CPT | Performed by: INTERNAL MEDICINE

## 2019-12-20 RX ORDER — FLUTICASONE FUROATE AND VILANTEROL 100; 25 UG/1; UG/1
1 POWDER RESPIRATORY (INHALATION) DAILY
Qty: 1 EACH | Refills: 3 | Status: SHIPPED | OUTPATIENT
Start: 2019-12-20 | End: 2020-04-14

## 2020-01-01 ENCOUNTER — TELEPHONE (OUTPATIENT)
Dept: INTERNAL MEDICINE CLINIC | Age: 50
End: 2020-01-01

## 2020-01-13 ENCOUNTER — HOSPITAL ENCOUNTER (OUTPATIENT)
Age: 50
Discharge: HOME | End: 2020-01-13
Payer: COMMERCIAL

## 2020-01-13 LAB
T4 FREE: 1.58 NG/DL (ref 0.9–1.8)
TSH HIGH SENSITIVITY: 0.96 UIU/ML (ref 0.27–4.2)

## 2020-01-13 PROCEDURE — 84443 ASSAY THYROID STIM HORMONE: CPT

## 2020-01-13 PROCEDURE — 84439 ASSAY OF FREE THYROXINE: CPT

## 2020-01-13 PROCEDURE — 36415 COLL VENOUS BLD VENIPUNCTURE: CPT

## 2020-03-18 RX ORDER — MONTELUKAST SODIUM 10 MG/1
TABLET ORAL
Qty: 90 TABLET | Refills: 1 | Status: SHIPPED | OUTPATIENT
Start: 2020-03-18 | End: 2020-09-18

## 2020-03-18 RX ORDER — DILTIAZEM HYDROCHLORIDE 120 MG/1
120 CAPSULE, COATED, EXTENDED RELEASE ORAL 2 TIMES DAILY
Qty: 180 CAPSULE | Refills: 1 | Status: SHIPPED | OUTPATIENT
Start: 2020-03-18 | End: 2020-09-18

## 2020-04-14 RX ORDER — FLUTICASONE FUROATE AND VILANTEROL TRIFENATATE 100; 25 UG/1; UG/1
POWDER RESPIRATORY (INHALATION)
Qty: 1 EACH | Refills: 5 | Status: SHIPPED | OUTPATIENT
Start: 2020-04-14 | End: 2020-09-23 | Stop reason: SDUPTHER

## 2020-09-23 ENCOUNTER — OFFICE VISIT (OUTPATIENT)
Dept: INTERNAL MEDICINE CLINIC | Age: 50
End: 2020-09-23
Payer: COMMERCIAL

## 2020-09-23 VITALS
RESPIRATION RATE: 16 BRPM | DIASTOLIC BLOOD PRESSURE: 80 MMHG | WEIGHT: 155 LBS | OXYGEN SATURATION: 97 % | BODY MASS INDEX: 25.79 KG/M2 | SYSTOLIC BLOOD PRESSURE: 140 MMHG | TEMPERATURE: 98.7 F | HEART RATE: 80 BPM

## 2020-09-23 PROBLEM — F51.02 ADJUSTMENT INSOMNIA: Status: ACTIVE | Noted: 2020-09-23

## 2020-09-23 LAB
A/G RATIO: 2.1 (ref 1.1–2.2)
ALBUMIN SERPL-MCNC: 4.6 G/DL (ref 3.4–5)
ALP BLD-CCNC: 41 U/L (ref 40–129)
ALT SERPL-CCNC: 13 U/L (ref 10–40)
ANION GAP SERPL CALCULATED.3IONS-SCNC: 11 MMOL/L (ref 3–16)
AST SERPL-CCNC: 15 U/L (ref 15–37)
BILIRUB SERPL-MCNC: 0.4 MG/DL (ref 0–1)
BUN BLDV-MCNC: 13 MG/DL (ref 7–20)
CALCIUM SERPL-MCNC: 9.7 MG/DL (ref 8.3–10.6)
CHLORIDE BLD-SCNC: 102 MMOL/L (ref 99–110)
CHOLESTEROL, FASTING: 188 MG/DL (ref 0–199)
CO2: 25 MMOL/L (ref 21–32)
CREAT SERPL-MCNC: 0.8 MG/DL (ref 0.6–1.1)
GFR AFRICAN AMERICAN: >60
GFR NON-AFRICAN AMERICAN: >60
GLOBULIN: 2.2 G/DL
GLUCOSE BLD-MCNC: 100 MG/DL (ref 70–99)
HDLC SERPL-MCNC: 75 MG/DL (ref 40–60)
LDL CHOLESTEROL CALCULATED: 85 MG/DL
POTASSIUM SERPL-SCNC: 4.6 MMOL/L (ref 3.5–5.1)
SODIUM BLD-SCNC: 138 MMOL/L (ref 136–145)
TOTAL PROTEIN: 6.8 G/DL (ref 6.4–8.2)
TRIGLYCERIDE, FASTING: 141 MG/DL (ref 0–150)
VLDLC SERPL CALC-MCNC: 28 MG/DL

## 2020-09-23 PROCEDURE — 36415 COLL VENOUS BLD VENIPUNCTURE: CPT | Performed by: INTERNAL MEDICINE

## 2020-09-23 PROCEDURE — 99213 OFFICE O/P EST LOW 20 MIN: CPT | Performed by: INTERNAL MEDICINE

## 2020-09-23 RX ORDER — DILTIAZEM HYDROCHLORIDE 120 MG/1
CAPSULE, COATED, EXTENDED RELEASE ORAL
Qty: 180 CAPSULE | Refills: 1 | Status: SHIPPED | OUTPATIENT
Start: 2020-09-23 | End: 2021-01-01 | Stop reason: SDUPTHER

## 2020-09-23 RX ORDER — ALBUTEROL SULFATE 90 UG/1
AEROSOL, METERED RESPIRATORY (INHALATION)
Qty: 8.5 INHALER | Refills: 5 | Status: SHIPPED | OUTPATIENT
Start: 2020-09-23 | End: 2021-01-01 | Stop reason: SDUPTHER

## 2020-09-23 RX ORDER — FLUTICASONE FUROATE AND VILANTEROL TRIFENATATE 100; 25 UG/1; UG/1
POWDER RESPIRATORY (INHALATION)
Qty: 1 EACH | Refills: 5 | Status: SHIPPED | OUTPATIENT
Start: 2020-09-23 | End: 2020-12-28 | Stop reason: CLARIF

## 2020-09-23 RX ORDER — HYDROXYZINE PAMOATE 25 MG/1
25 CAPSULE ORAL DAILY PRN
Qty: 30 CAPSULE | Refills: 0 | Status: SHIPPED | OUTPATIENT
Start: 2020-09-23 | End: 2020-10-16

## 2020-09-23 RX ORDER — MONTELUKAST SODIUM 10 MG/1
TABLET ORAL
Qty: 90 TABLET | Refills: 1 | Status: SHIPPED | OUTPATIENT
Start: 2020-09-23 | End: 2021-01-01 | Stop reason: SDUPTHER

## 2020-09-23 RX ORDER — TRAZODONE HYDROCHLORIDE 50 MG/1
50 TABLET ORAL NIGHTLY
Qty: 30 TABLET | Refills: 3 | Status: SHIPPED | OUTPATIENT
Start: 2020-09-23 | End: 2020-11-30

## 2020-09-23 ASSESSMENT — ENCOUNTER SYMPTOMS
COUGH: 0
GASTROINTESTINAL NEGATIVE: 1
WHEEZING: 0
RESPIRATORY NEGATIVE: 1
EYES NEGATIVE: 1
SHORTNESS OF BREATH: 0
ALLERGIC/IMMUNOLOGIC NEGATIVE: 1

## 2020-09-23 ASSESSMENT — PATIENT HEALTH QUESTIONNAIRE - PHQ9
1. LITTLE INTEREST OR PLEASURE IN DOING THINGS: 0
2. FEELING DOWN, DEPRESSED OR HOPELESS: 0
SUM OF ALL RESPONSES TO PHQ QUESTIONS 1-9: 0
SUM OF ALL RESPONSES TO PHQ9 QUESTIONS 1 & 2: 0
SUM OF ALL RESPONSES TO PHQ QUESTIONS 1-9: 0

## 2020-09-23 NOTE — ASSESSMENT & PLAN NOTE
Had total thyroidectomy by Dr Yonny Patino. On synthroid.  Managed by Dr Adelina Allan.  -he increased synthroid to 125 mcg daily except  1 1/2 tablet on Sundays

## 2020-09-23 NOTE — PROGRESS NOTES
Mima Beatty  Patient's  is 1970  Seen in office on 2020      SUBJECTIVE:  Kristen Mead jeanne 47 y. o.year old female presents today   Chief Complaint   Patient presents with    6 Month Follow-Up    Medication Refill    Anxiety     more so since covid,  lost job     Pt is here for asthma , HTN and hypothyroidism   Pt is feeling well  No complaints  No chest pain  No SOB  No HA  No NVD  Pt has ca thyroid by history and Dr Carlton Barajas sees her. C/o anxiety since covid scare. Pt's  lost job at [employer]. Taking medications regularly. No side effects noted. Review of Systems   Constitutional: Negative. Negative for chills, diaphoresis, fatigue and fever. HENT: Negative. Eyes: Negative. Respiratory: Negative. Negative for cough, shortness of breath and wheezing. Cardiovascular: Negative. Negative for chest pain and leg swelling. Gastrointestinal: Negative. Endocrine: Negative. Genitourinary: Negative. Musculoskeletal: Negative. Allergic/Immunologic: Negative. Neurological: Negative. Hematological: Negative. Psychiatric/Behavioral: Negative. OBJECTIVE: BP (!) 152/90   Pulse 80   Temp 98.7 °F (37.1 °C) (Oral)   Resp 16   Wt 155 lb (70.3 kg)   LMP 2020 (Approximate)   SpO2 97%   BMI 25.79 kg/m²     Wt Readings from Last 3 Encounters:   20 155 lb (70.3 kg)   19 157 lb 6.4 oz (71.4 kg)   19 153 lb 3.2 oz (69.5 kg)      GENERAL: - Alert, oriented, pleasant, in no apparent distress. HEENT: - Conjunctiva pink, no scleral icterus. ENT clear. NECK: -Supple. No jugular venous distention noted. No masses felt,  CARDIOVASCULAR: - Normal S1 and S2    PULMONARY: - No respiratory distress. No wheezes or rales. ABDOMEN: - Soft and non-tender,no masses  ororganomegaly. EXTREMITIES: - No cyanosis, clubbing, or significant edema. SKIN: Skin is warm and dry. NEUROLOGICAL: - Cranial nerves II through XII are grossly intact. IMPRESSION:    Encounter Diagnoses   Name Primary?  Essential hypertension     Cough variant asthma     Thyroid ca ([address]     Adjustment disorder with anxious mood     Adjustment insomnia        ASSESSMENT/PLAN:    Essential hypertension  Patient has hypertension. Pt is on diltiazem  mg bid   Repeat BP is normal.     Cough variant asthma  Stable on Qvar, albuterol and singulair. Doing well. Pt is working from home and asthma is better. Thyroid ca [facility])  Had total thyroidectomy by Dr Deja Portillo. On synthroid. Managed by Dr Dayana Jessica.  -he increased synthroid to 125 mcg daily except  1 1/2 tablet on Sundays    Adjustment disorder with anxious mood  Pt states she is anxious again. Take vistaril prn. Adjustment insomnia  Pt has insomnia because of anxiety and stress  Trial of trazodone. Side effects explained. Return to office in 3 months. Orders Placed This Encounter   Procedures    Comprehensive Metabolic Panel    Lipid, Fasting       Mediations reviewed with the patient. Continue current medications. Appropriate prescriptions are addressed. After visit summeryprovided. Follow up as directed sooner if needed. Questions answered and patient verbalizes understanding. Call for any problems, questions, or concerns.        No Known Allergies  Current Outpatient Medications   Medication Sig Dispense Refill    dilTIAZem (CARDIZEM CD) 120 MG extended release capsule TAKE 1 CAPSULE BY MOUTH TWICE A  capsule 0    montelukast (SINGULAIR) 10 MG tablet TAKE 1 TABLET BY MOUTH EVERY DAY AT NIGHT 90 tablet 0    BREO ELLIPTA 100-25 MCG/INH AEPB inhaler TAKE 1 PUFF BY MOUTH EVERY DAY 1 each 5    PROAIR  (90 Base) MCG/ACT inhaler TAKE 2 PUFFS BY MOUTH EVERY 6 HOURS AS NEEDED FOR WHEEZE 8.5 Inhaler 5    levothyroxine (SYNTHROID) 125 MCG tablet Take 125 mcg by mouth Daily Takes 1.5 tab on Sunday only,  1 tab all other days      calcium carbonate (OSCAL) 500 MG TABS tablet Take 500 mg by mouth daily      MULTIPLE VITAMIN PO Take 1 tablet by mouth daily. No current facility-administered medications for this visit. Past Medical History:   Diagnosis Date    Abnormal mammogram 9/24/2019 9/24/19 : abnormal : repeat in 6 months. Pt was informed by radiologist     Cough variant asthma     Depression     resolved   Camila Mahan 12-     Pulmonary nodules     f/u by Dr. Melvina Hdz. Benign. Last CT 1/12 stable middle lobe nodules.  Thyroid ca ([address] 12/15/2015    Had total thyroidectomy by Dr Sveta Meyer. On synthroid. Managed by Dr Anuradha Villarreal.  Thyroid nodule     seen Dr. Anuradha Villarreal, bx left nodule was neg last seen 6/13 US q year Nodule increased in size per pt. He is planning to do bx next year Had thyroid nodule biopsy on 8/4/2015. Nodule was 2.6 cm left inferior nodule Pt underwent total thyroidectomy in 9/25/15     Past Surgical History:   Procedure Laterality Date    THYROID SURGERY  9-25-15    total thyroidectomy     Social History     Tobacco Use    Smoking status: Never Smoker    Smokeless tobacco: Never Used   Substance Use Topics    Alcohol use:  Yes     Alcohol/week: 0.0 standard drinks       LAB REVIEW:  CBC:   Lab Results   Component Value Date    WBC 4.6 06/20/2019    HGB 14.2 06/20/2019    HCT 41.0 06/20/2019     06/20/2019     Lipids: No results found for: CHOL, TRIG, HDL, LDLCALC, LDLDIRECT, TRIGLYCFAST  Renal:   Lab Results   Component Value Date    BUN 18 06/20/2019    CREATININE 0.8 06/20/2019     06/20/2019    K 4.7 06/20/2019    ALT 14 06/20/2019    AST 16 06/20/2019    GLUCOSE 94 06/20/2019     PT/INR: No results found for: INR  A1C: No results found for: Steph Phoenix MD, 9/23/2020 , 8:54 AM

## 2020-10-16 RX ORDER — HYDROXYZINE PAMOATE 25 MG/1
25 CAPSULE ORAL DAILY PRN
Qty: 30 CAPSULE | Refills: 0 | Status: SHIPPED | OUTPATIENT
Start: 2020-10-16 | End: 2020-11-02 | Stop reason: SDUPTHER

## 2020-11-02 RX ORDER — HYDROXYZINE PAMOATE 25 MG/1
25 CAPSULE ORAL DAILY PRN
Qty: 30 CAPSULE | Refills: 0 | Status: SHIPPED | OUTPATIENT
Start: 2020-11-02 | End: 2020-11-30

## 2020-11-09 ENCOUNTER — HOSPITAL ENCOUNTER (OUTPATIENT)
Age: 50
Discharge: HOME | End: 2020-11-09
Payer: COMMERCIAL

## 2020-11-09 LAB — TSH HIGH SENSITIVITY: 0.35 UIU/ML (ref 0.27–4.2)

## 2020-11-09 PROCEDURE — 86800 THYROGLOBULIN ANTIBODY: CPT

## 2020-11-09 PROCEDURE — 84439 ASSAY OF FREE THYROXINE: CPT

## 2020-11-09 PROCEDURE — 36415 COLL VENOUS BLD VENIPUNCTURE: CPT

## 2020-11-09 PROCEDURE — 86376 MICROSOMAL ANTIBODY EACH: CPT

## 2020-11-09 PROCEDURE — 84443 ASSAY THYROID STIM HORMONE: CPT

## 2020-11-10 LAB — T4 FREE: 1.84 NG/DL (ref 0.9–1.8)

## 2020-11-11 LAB
ANTITHYROGLOBULIN AB: <0.9 IU/ML (ref 0–4)
ANTITHYROID MICORSOMAL: 0.9 IU/ML (ref 0–9)

## 2020-11-30 RX ORDER — TRAZODONE HYDROCHLORIDE 50 MG/1
TABLET ORAL
Qty: 90 TABLET | Refills: 1 | Status: SHIPPED | OUTPATIENT
Start: 2020-11-30 | End: 2021-01-01 | Stop reason: SDUPTHER

## 2020-11-30 RX ORDER — HYDROXYZINE PAMOATE 25 MG/1
25 CAPSULE ORAL DAILY PRN
Qty: 30 CAPSULE | Refills: 0 | Status: SHIPPED | OUTPATIENT
Start: 2020-11-30 | End: 2020-12-28 | Stop reason: SDUPTHER

## 2020-12-28 ENCOUNTER — OFFICE VISIT (OUTPATIENT)
Dept: INTERNAL MEDICINE CLINIC | Age: 50
End: 2020-12-28
Payer: COMMERCIAL

## 2020-12-28 VITALS
TEMPERATURE: 97.8 F | HEART RATE: 98 BPM | BODY MASS INDEX: 26.13 KG/M2 | DIASTOLIC BLOOD PRESSURE: 80 MMHG | OXYGEN SATURATION: 98 % | WEIGHT: 157 LBS | SYSTOLIC BLOOD PRESSURE: 120 MMHG

## 2020-12-28 PROBLEM — F41.9 ANXIETY: Status: ACTIVE | Noted: 2020-12-28

## 2020-12-28 PROCEDURE — 99213 OFFICE O/P EST LOW 20 MIN: CPT | Performed by: INTERNAL MEDICINE

## 2020-12-28 RX ORDER — BUDESONIDE AND FORMOTEROL FUMARATE DIHYDRATE 160; 4.5 UG/1; UG/1
2 AEROSOL RESPIRATORY (INHALATION) 2 TIMES DAILY
Qty: 1 INHALER | Refills: 5 | Status: SHIPPED | OUTPATIENT
Start: 2020-12-28 | End: 2021-01-01 | Stop reason: SDUPTHER

## 2020-12-28 RX ORDER — BUSPIRONE HYDROCHLORIDE 5 MG/1
5 TABLET ORAL 2 TIMES DAILY
Qty: 60 TABLET | Refills: 1 | Status: SHIPPED | OUTPATIENT
Start: 2020-12-28 | End: 2021-01-01

## 2020-12-28 RX ORDER — HYDROXYZINE PAMOATE 25 MG/1
25 CAPSULE ORAL DAILY PRN
Qty: 30 CAPSULE | Refills: 0 | Status: SHIPPED | OUTPATIENT
Start: 2020-12-28 | End: 2021-01-01

## 2020-12-28 SDOH — ECONOMIC STABILITY: FOOD INSECURITY: WITHIN THE PAST 12 MONTHS, YOU WORRIED THAT YOUR FOOD WOULD RUN OUT BEFORE YOU GOT MONEY TO BUY MORE.: NEVER TRUE

## 2020-12-28 SDOH — ECONOMIC STABILITY: FOOD INSECURITY: WITHIN THE PAST 12 MONTHS, THE FOOD YOU BOUGHT JUST DIDN'T LAST AND YOU DIDN'T HAVE MONEY TO GET MORE.: NEVER TRUE

## 2020-12-28 SDOH — ECONOMIC STABILITY: INCOME INSECURITY: HOW HARD IS IT FOR YOU TO PAY FOR THE VERY BASICS LIKE FOOD, HOUSING, MEDICAL CARE, AND HEATING?: NOT HARD AT ALL

## 2020-12-28 SDOH — ECONOMIC STABILITY: TRANSPORTATION INSECURITY
IN THE PAST 12 MONTHS, HAS THE LACK OF TRANSPORTATION KEPT YOU FROM MEDICAL APPOINTMENTS OR FROM GETTING MEDICATIONS?: NO

## 2020-12-28 SDOH — ECONOMIC STABILITY: TRANSPORTATION INSECURITY
IN THE PAST 12 MONTHS, HAS LACK OF TRANSPORTATION KEPT YOU FROM MEETINGS, WORK, OR FROM GETTING THINGS NEEDED FOR DAILY LIVING?: NO

## 2020-12-28 ASSESSMENT — ENCOUNTER SYMPTOMS
ALLERGIC/IMMUNOLOGIC NEGATIVE: 1
RESPIRATORY NEGATIVE: 1
EYES NEGATIVE: 1
COUGH: 0
GASTROINTESTINAL NEGATIVE: 1
SHORTNESS OF BREATH: 0
WHEEZING: 0

## 2020-12-28 NOTE — ASSESSMENT & PLAN NOTE
Patient has asthma cough variant. She was taking Breo but that is not covered by her formulary. We will change her to Symbicort. Continue albuterol and Singulair.

## 2020-12-28 NOTE — PROGRESS NOTES
Serena Buitrago  Patient's  is 1970  Seen in office on 2020      SUBJECTIVE:  [facility] jeanne 45 y. o.year old female presents today   Chief Complaint   Patient presents with    3 Month Follow-Up    Anxiety     Pt is here for f/u of anxiety, HTN insomnia and copd  Pt states she is still anxious  Taking vistaril prn  For insomnia she takes trazodone and it helps  Asthma is stable. Insurance is not going to cover Breo  No fever or chills. No headache  Pt did not have period since 2020  Has gynecologist   Taking medications regularly. No side effects noted. Review of Systems   Constitutional: Negative. Negative for chills, diaphoresis and fever. HENT: Negative. Eyes: Negative. Respiratory: Negative. Negative for cough, shortness of breath and wheezing. Cardiovascular: Negative. Negative for chest pain and leg swelling. Gastrointestinal: Negative. Endocrine: Negative. Genitourinary: Negative. Musculoskeletal: Negative. Skin: Negative. Allergic/Immunologic: Negative. Neurological: Negative. Hematological: Negative. Psychiatric/Behavioral: Negative for dysphoric mood, self-injury, sleep disturbance and suicidal ideas. The patient is nervous/anxious. OBJECTIVE: /80   Pulse 98   Temp 97.8 °F (36.6 °C) (Infrared)   Wt 157 lb (71.2 kg)   LMP 2020   SpO2 98%   BMI 26.13 kg/m²     Wt Readings from Last 3 Encounters:   20 157 lb (71.2 kg)   20 155 lb (70.3 kg)   19 157 lb 6.4 oz (71.4 kg)        Patient was seen taking COVID-19 precautions. Face mask, gloves and eyes protectors were used. Patient also wore facemask. GENERAL:  Alert, oriented, pleasant, in no apparent distress. HEENT:  Conjunctiva pink, no scleral icterus. ENT clear. NECK: Supple. No jugular venous distention noted. No masses felt,  CARDIOVASCULAR:  Normal S1 and S2    PULMONARY:  No respiratory distress. No wheezes or rales. ABDOMEN:  Soft and non-tender,no masses  ororganomegaly. EXTREMITIES:  No cyanosis, clubbing, or significant edema. SKIN: Skin is warm and dry. NEUROLOGICAL:  Cranial nerves II through XII are grossly intact. IMPRESSION:    Encounter Diagnoses   Name Primary?  Cough variant asthma Yes    Anxiety     Essential hypertension     Thyroid ca ([facility] [address]     Adjustment disorder with anxious mood     Abnormal mammogram        ASSESSMENT/PLAN:    Anxiety  Patient has anxiety. She was taking Vistaril as needed but states he still feels anxious. We will start her on BuSpar 5 mg twice a day    Cough variant asthma  Patient has asthma cough variant. She was taking Breo but that is not covered by her formulary. We will change her to Symbicort. Continue albuterol and Singulair. Essential hypertension  Patient has hypertension that is controlled with diltiazem  mg twice a day. Thyroid ca [facility])  Had total thyroidectomy by Dr Blaire Dawson. On synthroid. Managed by Dr Tez Wilson.  -he increased synthroid to 125 mcg daily except  1 1/2 tablet on Sundays    Abnormal mammogram  9/24/19 : abnormal : repeat in 6 months. Pt was informed by radiologist   Mammogram in 3/20 was normal.  Patient is followed by gynecologist    Return to office in 3 months. Mediations reviewed with the patient. Continue current medications. Appropriate prescriptions are addressed. After visit summeryprovided. Follow up as directed sooner if needed. Questions answered and patient verbalizes understanding. Call for any problems, questions, or concerns.        No Known Allergies  Current Outpatient Medications   Medication Sig Dispense Refill    traZODone (DESYREL) 50 MG tablet TAKE 1 TABLET BY MOUTH EVERY DAY AT NIGHT 90 tablet 1    hydrOXYzine (VISTARIL) 25 MG capsule TAKE 1 CAPSULE BY MOUTH DAILY AS NEEDED FOR ANXIETY 30 capsule 0  06/20/2019     Lipids:   Lab Results   Component Value Date    HDL 75 (H) 09/23/2020    LDLCALC 85 09/23/2020    TRIGLYCFAST 141 09/23/2020     Renal:   Lab Results   Component Value Date    BUN 13 09/23/2020    CREATININE 0.8 09/23/2020     09/23/2020    K 4.6 09/23/2020    ALT 13 09/23/2020    AST 15 09/23/2020    GLUCOSE 100 09/23/2020     PT/INR: No results found for: INR  A1C: No results found for: Daniel Nava MD, 12/28/2020 , 10:20 AM

## 2020-12-28 NOTE — ASSESSMENT & PLAN NOTE
Patient has anxiety. She was taking Vistaril as needed but states he still feels anxious.   We will start her on BuSpar 5 mg twice a day

## 2020-12-28 NOTE — ASSESSMENT & PLAN NOTE
Had total thyroidectomy by Dr Chiki Nicole. On synthroid.  Managed by Dr Radha Cheng.  -he increased synthroid to 125 mcg daily except  1 1/2 tablet on Sundays

## 2021-01-01 ENCOUNTER — APPOINTMENT (OUTPATIENT)
Dept: GENERAL RADIOLOGY | Age: 51
DRG: 870 | End: 2021-01-01
Payer: COMMERCIAL

## 2021-01-01 ENCOUNTER — TELEPHONE (OUTPATIENT)
Dept: INTERNAL MEDICINE CLINIC | Age: 51
End: 2021-01-01

## 2021-01-01 ENCOUNTER — HOSPITAL ENCOUNTER (OUTPATIENT)
Dept: INFUSION THERAPY | Age: 51
Setting detail: INFUSION SERIES
Discharge: HOME | End: 2021-12-08

## 2021-01-01 ENCOUNTER — ANESTHESIA (OUTPATIENT)
Dept: INPATIENT UNIT | Age: 51
DRG: 870 | End: 2021-01-01
Payer: COMMERCIAL

## 2021-01-01 ENCOUNTER — ANESTHESIA EVENT (OUTPATIENT)
Dept: INPATIENT UNIT | Age: 51
DRG: 870 | End: 2021-01-01
Payer: COMMERCIAL

## 2021-01-01 ENCOUNTER — OFFICE VISIT (OUTPATIENT)
Dept: INTERNAL MEDICINE CLINIC | Age: 51
End: 2021-01-01
Payer: COMMERCIAL

## 2021-01-01 ENCOUNTER — HOSPITAL ENCOUNTER (INPATIENT)
Age: 51
LOS: 20 days | DRG: 870 | End: 2021-12-29
Attending: INTERNAL MEDICINE | Admitting: INTERNAL MEDICINE
Payer: COMMERCIAL

## 2021-01-01 ENCOUNTER — VIRTUAL VISIT (OUTPATIENT)
Dept: INTERNAL MEDICINE CLINIC | Age: 51
End: 2021-01-01
Payer: COMMERCIAL

## 2021-01-01 ENCOUNTER — APPOINTMENT (OUTPATIENT)
Dept: CT IMAGING | Age: 51
DRG: 870 | End: 2021-01-01
Payer: COMMERCIAL

## 2021-01-01 VITALS
OXYGEN SATURATION: 97 % | TEMPERATURE: 99.1 F | BODY MASS INDEX: 26.66 KG/M2 | WEIGHT: 160.2 LBS | RESPIRATION RATE: 16 BRPM | SYSTOLIC BLOOD PRESSURE: 118 MMHG | HEART RATE: 72 BPM | DIASTOLIC BLOOD PRESSURE: 80 MMHG

## 2021-01-01 VITALS
HEART RATE: 105 BPM | HEIGHT: 65 IN | OXYGEN SATURATION: 60 % | BODY MASS INDEX: 26.37 KG/M2 | SYSTOLIC BLOOD PRESSURE: 94 MMHG | DIASTOLIC BLOOD PRESSURE: 68 MMHG | TEMPERATURE: 97.5 F | WEIGHT: 158.29 LBS | RESPIRATION RATE: 22 BRPM

## 2021-01-01 VITALS
OXYGEN SATURATION: 98 % | BODY MASS INDEX: 26.96 KG/M2 | HEART RATE: 80 BPM | SYSTOLIC BLOOD PRESSURE: 112 MMHG | RESPIRATION RATE: 16 BRPM | TEMPERATURE: 98.8 F | WEIGHT: 162 LBS | DIASTOLIC BLOOD PRESSURE: 64 MMHG

## 2021-01-01 VITALS
BODY MASS INDEX: 27.29 KG/M2 | TEMPERATURE: 98.9 F | OXYGEN SATURATION: 98 % | HEART RATE: 72 BPM | SYSTOLIC BLOOD PRESSURE: 128 MMHG | HEIGHT: 65 IN | DIASTOLIC BLOOD PRESSURE: 80 MMHG | WEIGHT: 163.8 LBS | RESPIRATION RATE: 16 BRPM

## 2021-01-01 VITALS
HEART RATE: 98 BPM | TEMPERATURE: 98 F | DIASTOLIC BLOOD PRESSURE: 67 MMHG | RESPIRATION RATE: 20 BRPM | SYSTOLIC BLOOD PRESSURE: 102 MMHG | OXYGEN SATURATION: 89 %

## 2021-01-01 DIAGNOSIS — J12.82 PNEUMONIA DUE TO COVID-19 VIRUS: Primary | ICD-10-CM

## 2021-01-01 DIAGNOSIS — F43.22 ADJUSTMENT DISORDER WITH ANXIOUS MOOD: ICD-10-CM

## 2021-01-01 DIAGNOSIS — F41.9 ANXIETY: ICD-10-CM

## 2021-01-01 DIAGNOSIS — J45.991 COUGH VARIANT ASTHMA: ICD-10-CM

## 2021-01-01 DIAGNOSIS — R09.02 HYPOXIA: ICD-10-CM

## 2021-01-01 DIAGNOSIS — R92.8 ABNORMAL MAMMOGRAM: ICD-10-CM

## 2021-01-01 DIAGNOSIS — U07.1 PNEUMONIA DUE TO COVID-19 VIRUS: Primary | ICD-10-CM

## 2021-01-01 DIAGNOSIS — I10 ESSENTIAL HYPERTENSION: ICD-10-CM

## 2021-01-01 DIAGNOSIS — F41.9 ANXIETY: Primary | ICD-10-CM

## 2021-01-01 DIAGNOSIS — F51.02 ADJUSTMENT INSOMNIA: ICD-10-CM

## 2021-01-01 DIAGNOSIS — U07.1 COVID-19 VIRUS INFECTION: Primary | ICD-10-CM

## 2021-01-01 DIAGNOSIS — C73 THYROID CA (HCC): ICD-10-CM

## 2021-01-01 DIAGNOSIS — R00.0 TACHYCARDIA: ICD-10-CM

## 2021-01-01 LAB
ALBUMIN SERPL-MCNC: 2.6 GM/DL (ref 3.4–5)
ALBUMIN SERPL-MCNC: 2.6 GM/DL (ref 3.4–5)
ALBUMIN SERPL-MCNC: 2.7 GM/DL (ref 3.4–5)
ALBUMIN SERPL-MCNC: 2.7 GM/DL (ref 3.4–5)
ALBUMIN SERPL-MCNC: 2.8 GM/DL (ref 3.4–5)
ALBUMIN SERPL-MCNC: 2.9 GM/DL (ref 3.4–5)
ALBUMIN SERPL-MCNC: 3 GM/DL (ref 3.4–5)
ALBUMIN SERPL-MCNC: 3.1 GM/DL (ref 3.4–5)
ALBUMIN SERPL-MCNC: 3.1 GM/DL (ref 3.4–5)
ALBUMIN SERPL-MCNC: 3.6 GM/DL (ref 3.4–5)
ALBUMIN SERPL-MCNC: 3.6 GM/DL (ref 3.4–5)
ALBUMIN SERPL-MCNC: 3.7 GM/DL (ref 3.4–5)
ALP BLD-CCNC: 120 IU/L (ref 40–128)
ALP BLD-CCNC: 147 IU/L (ref 40–128)
ALP BLD-CCNC: 152 IU/L (ref 40–128)
ALP BLD-CCNC: 154 IU/L (ref 40–128)
ALP BLD-CCNC: 165 IU/L (ref 40–128)
ALP BLD-CCNC: 227 IU/L (ref 40–129)
ALP BLD-CCNC: 252 IU/L (ref 40–128)
ALP BLD-CCNC: 49 IU/L (ref 40–129)
ALP BLD-CCNC: 52 IU/L (ref 40–128)
ALP BLD-CCNC: 80 IU/L (ref 40–129)
ALP BLD-CCNC: 84 IU/L (ref 40–128)
ALP BLD-CCNC: 98 IU/L (ref 40–129)
ALT SERPL-CCNC: 109 U/L (ref 10–40)
ALT SERPL-CCNC: 126 U/L (ref 10–40)
ALT SERPL-CCNC: 137 U/L (ref 10–40)
ALT SERPL-CCNC: 153 U/L (ref 10–40)
ALT SERPL-CCNC: 209 U/L (ref 10–40)
ALT SERPL-CCNC: 21 U/L (ref 10–40)
ALT SERPL-CCNC: 24 U/L (ref 10–40)
ALT SERPL-CCNC: 40 U/L (ref 10–40)
ALT SERPL-CCNC: 47 U/L (ref 10–40)
ALT SERPL-CCNC: 54 U/L (ref 10–40)
ALT SERPL-CCNC: 78 U/L (ref 10–40)
ALT SERPL-CCNC: 87 U/L (ref 10–40)
ANION GAP SERPL CALCULATED.3IONS-SCNC: 10 MMOL/L (ref 4–16)
ANION GAP SERPL CALCULATED.3IONS-SCNC: 10 MMOL/L (ref 4–16)
ANION GAP SERPL CALCULATED.3IONS-SCNC: 11 MMOL/L (ref 4–16)
ANION GAP SERPL CALCULATED.3IONS-SCNC: 12 MMOL/L (ref 4–16)
ANION GAP SERPL CALCULATED.3IONS-SCNC: 13 MMOL/L (ref 4–16)
ANION GAP SERPL CALCULATED.3IONS-SCNC: 15 MMOL/L (ref 4–16)
ANION GAP SERPL CALCULATED.3IONS-SCNC: 16 MMOL/L (ref 4–16)
ANION GAP SERPL CALCULATED.3IONS-SCNC: 7 MMOL/L (ref 4–16)
ANION GAP SERPL CALCULATED.3IONS-SCNC: 7 MMOL/L (ref 4–16)
ANION GAP SERPL CALCULATED.3IONS-SCNC: 8 MMOL/L (ref 4–16)
ANION GAP SERPL CALCULATED.3IONS-SCNC: 9 MMOL/L (ref 4–16)
ANISOCYTOSIS: ABNORMAL
APTT: 35.5 SECONDS (ref 25.1–37.1)
APTT: 37.2 SECONDS (ref 25.1–37.1)
AST SERPL-CCNC: 100 IU/L (ref 15–37)
AST SERPL-CCNC: 24 IU/L (ref 15–37)
AST SERPL-CCNC: 25 IU/L (ref 15–37)
AST SERPL-CCNC: 29 IU/L (ref 15–37)
AST SERPL-CCNC: 30 IU/L (ref 15–37)
AST SERPL-CCNC: 32 IU/L (ref 15–37)
AST SERPL-CCNC: 33 IU/L (ref 15–37)
AST SERPL-CCNC: 36 IU/L (ref 15–37)
AST SERPL-CCNC: 36 IU/L (ref 15–37)
AST SERPL-CCNC: 47 IU/L (ref 15–37)
AST SERPL-CCNC: 49 IU/L (ref 15–37)
AST SERPL-CCNC: 53 IU/L (ref 15–37)
BACTERIA: ABNORMAL /HPF
BACTERIA: NEGATIVE /HPF
BANDED NEUTROPHILS ABSOLUTE COUNT: 0.2 K/CU MM
BANDED NEUTROPHILS ABSOLUTE COUNT: 0.27 K/CU MM
BANDED NEUTROPHILS ABSOLUTE COUNT: 0.29 K/CU MM
BANDED NEUTROPHILS ABSOLUTE COUNT: 0.68 K/CU MM
BANDED NEUTROPHILS ABSOLUTE COUNT: 1.01 K/CU MM
BANDED NEUTROPHILS ABSOLUTE COUNT: 18.13 K/CU MM
BANDED NEUTROPHILS ABSOLUTE COUNT: 2.46 K/CU MM
BANDED NEUTROPHILS ABSOLUTE COUNT: 3.21 K/CU MM
BANDED NEUTROPHILS ABSOLUTE COUNT: 3.22 K/CU MM
BANDED NEUTROPHILS ABSOLUTE COUNT: 3.29 K/CU MM
BANDED NEUTROPHILS ABSOLUTE COUNT: 4.43 K/CU MM
BANDED NEUTROPHILS ABSOLUTE COUNT: 4.63 K/CU MM
BANDED NEUTROPHILS ABSOLUTE COUNT: 5.02 K/CU MM
BANDED NEUTROPHILS ABSOLUTE COUNT: 6.45 K/CU MM
BANDED NEUTROPHILS ABSOLUTE COUNT: 9.9 K/CU MM
BANDED NEUTROPHILS RELATIVE PERCENT: 11 % (ref 5–11)
BANDED NEUTROPHILS RELATIVE PERCENT: 12 % (ref 5–11)
BANDED NEUTROPHILS RELATIVE PERCENT: 14 % (ref 5–11)
BANDED NEUTROPHILS RELATIVE PERCENT: 14 % (ref 5–11)
BANDED NEUTROPHILS RELATIVE PERCENT: 15 % (ref 5–11)
BANDED NEUTROPHILS RELATIVE PERCENT: 18 % (ref 5–11)
BANDED NEUTROPHILS RELATIVE PERCENT: 18 % (ref 5–11)
BANDED NEUTROPHILS RELATIVE PERCENT: 2 % (ref 5–11)
BANDED NEUTROPHILS RELATIVE PERCENT: 24 % (ref 5–11)
BANDED NEUTROPHILS RELATIVE PERCENT: 27 % (ref 5–11)
BANDED NEUTROPHILS RELATIVE PERCENT: 29 % (ref 5–11)
BANDED NEUTROPHILS RELATIVE PERCENT: 3 % (ref 5–11)
BANDED NEUTROPHILS RELATIVE PERCENT: 36 % (ref 5–11)
BANDED NEUTROPHILS RELATIVE PERCENT: 4 % (ref 5–11)
BANDED NEUTROPHILS RELATIVE PERCENT: 4 % (ref 5–11)
BASE EXCESS MIXED: 1.3 (ref 0–2.3)
BASE EXCESS MIXED: 1.8 (ref 0–2.3)
BASE EXCESS MIXED: 2.2 (ref 0–2.3)
BASE EXCESS MIXED: 2.8 (ref 0–2.3)
BASE EXCESS MIXED: 3.4 (ref 0–2.3)
BASE EXCESS MIXED: 3.4 (ref 0–2.3)
BASE EXCESS MIXED: 4.2 (ref 0–2.3)
BASE EXCESS MIXED: 5.1 (ref 0–2.3)
BASE EXCESS MIXED: 5.6 (ref 0–2.3)
BASE EXCESS MIXED: 5.8 (ref 0–2.3)
BASE EXCESS MIXED: 6.4 (ref 0–2.3)
BASE EXCESS: 0 (ref 0–2.4)
BASE EXCESS: 1 (ref 0–2.4)
BASE EXCESS: 10 (ref 0–2.4)
BASE EXCESS: 5 (ref 0–2.4)
BASOPHILS ABSOLUTE: 0 K/CU MM
BASOPHILS ABSOLUTE: 0.1 K/CU MM
BASOPHILS RELATIVE PERCENT: 0.2 % (ref 0–1)
BASOPHILS RELATIVE PERCENT: 0.3 % (ref 0–1)
BILIRUB SERPL-MCNC: 0.2 MG/DL (ref 0–1)
BILIRUB SERPL-MCNC: 0.2 MG/DL (ref 0–1)
BILIRUB SERPL-MCNC: 0.3 MG/DL (ref 0–1)
BILIRUB SERPL-MCNC: 0.4 MG/DL (ref 0–1)
BILIRUB SERPL-MCNC: 0.4 MG/DL (ref 0–1)
BILIRUB SERPL-MCNC: 0.5 MG/DL (ref 0–1)
BILIRUB SERPL-MCNC: 0.6 MG/DL (ref 0–1)
BILIRUBIN DIRECT: 0.2 MG/DL (ref 0–0.3)
BILIRUBIN DIRECT: 0.2 MG/DL (ref 0–0.3)
BILIRUBIN DIRECT: 0.4 MG/DL (ref 0–0.3)
BILIRUBIN URINE: NEGATIVE MG/DL
BILIRUBIN URINE: NEGATIVE MG/DL
BILIRUBIN, INDIRECT: 0 MG/DL (ref 0–0.7)
BILIRUBIN, INDIRECT: 0.1 MG/DL (ref 0–0.7)
BILIRUBIN, INDIRECT: 0.2 MG/DL (ref 0–0.7)
BLOOD, URINE: ABNORMAL
BLOOD, URINE: ABNORMAL
BUN BLDV-MCNC: 10 MG/DL (ref 6–23)
BUN BLDV-MCNC: 11 MG/DL (ref 6–23)
BUN BLDV-MCNC: 15 MG/DL (ref 6–23)
BUN BLDV-MCNC: 15 MG/DL (ref 6–23)
BUN BLDV-MCNC: 16 MG/DL (ref 6–23)
BUN BLDV-MCNC: 20 MG/DL (ref 6–23)
BUN BLDV-MCNC: 23 MG/DL (ref 6–23)
BUN BLDV-MCNC: 25 MG/DL (ref 6–23)
BUN BLDV-MCNC: 26 MG/DL (ref 6–23)
BUN BLDV-MCNC: 27 MG/DL (ref 6–23)
BUN BLDV-MCNC: 33 MG/DL (ref 6–23)
C-REACTIVE PROTEIN, HIGH SENSITIVITY: 21.7 MG/L
CALCIUM SERPL-MCNC: 7.8 MG/DL (ref 8.3–10.6)
CALCIUM SERPL-MCNC: 7.8 MG/DL (ref 8.3–10.6)
CALCIUM SERPL-MCNC: 8 MG/DL (ref 8.3–10.6)
CALCIUM SERPL-MCNC: 8.1 MG/DL (ref 8.3–10.6)
CALCIUM SERPL-MCNC: 8.2 MG/DL (ref 8.3–10.6)
CALCIUM SERPL-MCNC: 8.3 MG/DL (ref 8.3–10.6)
CALCIUM SERPL-MCNC: 8.4 MG/DL (ref 8.3–10.6)
CALCIUM SERPL-MCNC: 8.6 MG/DL (ref 8.3–10.6)
CALCIUM SERPL-MCNC: 8.7 MG/DL (ref 8.3–10.6)
CALCIUM SERPL-MCNC: 8.8 MG/DL (ref 8.3–10.6)
CARBON MONOXIDE, BLOOD: 1.5 % (ref 0–5)
CARBON MONOXIDE, BLOOD: 2 % (ref 0–5)
CARBON MONOXIDE, BLOOD: 2.2 % (ref 0–5)
CARBON MONOXIDE, BLOOD: 2.3 % (ref 0–5)
CARBON MONOXIDE, BLOOD: 2.5 % (ref 0–5)
CARBON MONOXIDE, BLOOD: 2.7 % (ref 0–5)
CARBON MONOXIDE, BLOOD: 2.8 % (ref 0–5)
CARBON MONOXIDE, BLOOD: 2.8 % (ref 0–5)
CARBON MONOXIDE, BLOOD: 2.9 % (ref 0–5)
CARBON MONOXIDE, BLOOD: 3.1 % (ref 0–5)
CARBON MONOXIDE, BLOOD: 3.3 % (ref 0–5)
CELLULAR CASTS: 6 /LPF
CHLORIDE BLD-SCNC: 100 MMOL/L (ref 99–110)
CHLORIDE BLD-SCNC: 101 MMOL/L (ref 99–110)
CHLORIDE BLD-SCNC: 102 MMOL/L (ref 99–110)
CHLORIDE BLD-SCNC: 103 MMOL/L (ref 99–110)
CHLORIDE BLD-SCNC: 93 MMOL/L (ref 99–110)
CHLORIDE BLD-SCNC: 96 MMOL/L (ref 99–110)
CHLORIDE BLD-SCNC: 97 MMOL/L (ref 99–110)
CHLORIDE BLD-SCNC: 98 MMOL/L (ref 99–110)
CHLORIDE BLD-SCNC: 99 MMOL/L (ref 99–110)
CLARITY: CLEAR
CLARITY: CLEAR
CO2 CONTENT: 24.5 MMOL/L (ref 19–24)
CO2 CONTENT: 26.1 MMOL/L (ref 19–24)
CO2 CONTENT: 26.3 MMOL/L (ref 19–24)
CO2 CONTENT: 26.8 MMOL/L (ref 19–24)
CO2 CONTENT: 27.3 MMOL/L (ref 19–24)
CO2 CONTENT: 29.8 MMOL/L (ref 19–24)
CO2 CONTENT: 31.1 MMOL/L (ref 19–24)
CO2 CONTENT: 31.8 MMOL/L (ref 19–24)
CO2 CONTENT: 31.8 MMOL/L (ref 19–24)
CO2 CONTENT: 31.9 MMOL/L (ref 19–24)
CO2 CONTENT: 32 MMOL/L (ref 19–24)
CO2 CONTENT: 32.1 MMOL/L (ref 19–24)
CO2 CONTENT: 33.6 MMOL/L (ref 19–24)
CO2 CONTENT: 35.3 MMOL/L (ref 19–24)
CO2: 23 MMOL/L (ref 21–32)
CO2: 24 MMOL/L (ref 21–32)
CO2: 25 MMOL/L (ref 21–32)
CO2: 25 MMOL/L (ref 21–32)
CO2: 26 MMOL/L (ref 21–32)
CO2: 26 MMOL/L (ref 21–32)
CO2: 27 MMOL/L (ref 21–32)
CO2: 29 MMOL/L (ref 21–32)
CO2: 29 MMOL/L (ref 21–32)
CO2: 30 MMOL/L (ref 21–32)
CO2: 31 MMOL/L (ref 21–32)
COLOR: ABNORMAL
COLOR: YELLOW
COMMENT: ABNORMAL
CREAT SERPL-MCNC: 0.3 MG/DL (ref 0.6–1.1)
CREAT SERPL-MCNC: 0.4 MG/DL (ref 0.6–1.1)
CREAT SERPL-MCNC: 0.4 MG/DL (ref 0.6–1.1)
CREAT SERPL-MCNC: 0.5 MG/DL (ref 0.6–1.1)
CREAT SERPL-MCNC: 0.6 MG/DL (ref 0.6–1.1)
CREAT SERPL-MCNC: 0.7 MG/DL (ref 0.6–1.1)
CREAT SERPL-MCNC: 1 MG/DL (ref 0.6–1.1)
CREAT SERPL-MCNC: 1.1 MG/DL (ref 0.6–1.1)
CULTURE: NORMAL
D DIMER: 2100 NG/ML(DDU)
D DIMER: 544 NG/ML(DDU)
D DIMER: 564 NG/ML(DDU)
D DIMER: 567 NG/ML(DDU)
D DIMER: 600 NG/ML(DDU)
DIFFERENTIAL TYPE: ABNORMAL
DOHLE BODIES: PRESENT
DOSE AMOUNT: NORMAL
DOSE AMOUNT: NORMAL
DOSE TIME: NORMAL
DOSE TIME: NORMAL
EKG ATRIAL RATE: 103 BPM
EKG ATRIAL RATE: 84 BPM
EKG DIAGNOSIS: NORMAL
EKG DIAGNOSIS: NORMAL
EKG P AXIS: 34 DEGREES
EKG P AXIS: 44 DEGREES
EKG P-R INTERVAL: 146 MS
EKG P-R INTERVAL: 150 MS
EKG Q-T INTERVAL: 326 MS
EKG Q-T INTERVAL: 372 MS
EKG QRS DURATION: 70 MS
EKG QRS DURATION: 74 MS
EKG QTC CALCULATION (BAZETT): 427 MS
EKG QTC CALCULATION (BAZETT): 439 MS
EKG R AXIS: -11 DEGREES
EKG R AXIS: 9 DEGREES
EKG T AXIS: 36 DEGREES
EKG T AXIS: 7 DEGREES
EKG VENTRICULAR RATE: 103 BPM
EKG VENTRICULAR RATE: 84 BPM
EOSINOPHILS ABSOLUTE: 0 K/CU MM
EOSINOPHILS ABSOLUTE: 0.1 K/CU MM
EOSINOPHILS ABSOLUTE: 0.1 K/CU MM
EOSINOPHILS RELATIVE PERCENT: 0 % (ref 0–3)
EOSINOPHILS RELATIVE PERCENT: 0 % (ref 0–3)
EOSINOPHILS RELATIVE PERCENT: 0.1 % (ref 0–3)
EOSINOPHILS RELATIVE PERCENT: 0.2 % (ref 0–3)
EOSINOPHILS RELATIVE PERCENT: 0.7 % (ref 0–3)
EOSINOPHILS RELATIVE PERCENT: 1 % (ref 0–3)
FERRITIN: 456 NG/ML (ref 15–150)
FIBRINOGEN LEVEL: 582 MG/DL (ref 196.9–442.1)
FIBRINOGEN LEVEL: 952 MG/DL (ref 196.9–442.1)
GFR AFRICAN AMERICAN: >60 ML/MIN/1.73M2
GFR NON-AFRICAN AMERICAN: 52 ML/MIN/1.73M2
GFR NON-AFRICAN AMERICAN: 58 ML/MIN/1.73M2
GFR NON-AFRICAN AMERICAN: >60 ML/MIN/1.73M2
GLUCOSE BLD-MCNC: 120 MG/DL (ref 70–99)
GLUCOSE BLD-MCNC: 122 MG/DL (ref 70–99)
GLUCOSE BLD-MCNC: 124 MG/DL (ref 70–99)
GLUCOSE BLD-MCNC: 132 MG/DL (ref 70–99)
GLUCOSE BLD-MCNC: 142 MG/DL (ref 70–99)
GLUCOSE BLD-MCNC: 150 MG/DL (ref 70–99)
GLUCOSE BLD-MCNC: 152 MG/DL (ref 70–99)
GLUCOSE BLD-MCNC: 154 MG/DL (ref 70–99)
GLUCOSE BLD-MCNC: 156 MG/DL (ref 70–99)
GLUCOSE BLD-MCNC: 156 MG/DL (ref 70–99)
GLUCOSE BLD-MCNC: 157 MG/DL (ref 70–99)
GLUCOSE BLD-MCNC: 173 MG/DL (ref 70–99)
GLUCOSE BLD-MCNC: 174 MG/DL (ref 70–99)
GLUCOSE BLD-MCNC: 176 MG/DL (ref 70–99)
GLUCOSE BLD-MCNC: 183 MG/DL (ref 70–99)
GLUCOSE BLD-MCNC: 185 MG/DL (ref 70–99)
GLUCOSE BLD-MCNC: 196 MG/DL (ref 70–99)
GLUCOSE BLD-MCNC: 235 MG/DL (ref 70–99)
GLUCOSE BLD-MCNC: 287 MG/DL (ref 70–99)
GLUCOSE BLD-MCNC: 91 MG/DL (ref 70–99)
GLUCOSE, URINE: NEGATIVE MG/DL
GLUCOSE, URINE: NEGATIVE MG/DL
GRANULAR CASTS: 2 /LPF
HCO3 ARTERIAL: 23.4 MMOL/L (ref 18–23)
HCO3 ARTERIAL: 23.4 MMOL/L (ref 18–23)
HCO3 ARTERIAL: 25 MMOL/L (ref 18–23)
HCO3 ARTERIAL: 25.2 MMOL/L (ref 18–23)
HCO3 ARTERIAL: 25.4 MMOL/L (ref 18–23)
HCO3 ARTERIAL: 28.3 MMOL/L (ref 18–23)
HCO3 ARTERIAL: 29.6 MMOL/L (ref 18–23)
HCO3 ARTERIAL: 30.3 MMOL/L (ref 18–23)
HCO3 ARTERIAL: 30.4 MMOL/L (ref 18–23)
HCO3 ARTERIAL: 30.5 MMOL/L (ref 18–23)
HCO3 ARTERIAL: 30.5 MMOL/L (ref 18–23)
HCO3 ARTERIAL: 30.6 MMOL/L (ref 18–23)
HCO3 ARTERIAL: 31.5 MMOL/L (ref 18–23)
HCO3 ARTERIAL: 33.4 MMOL/L (ref 18–23)
HCO3 VENOUS: 25.7 MMOL/L (ref 19–25)
HCT VFR BLD CALC: 27 % (ref 37–47)
HCT VFR BLD CALC: 30.2 % (ref 37–47)
HCT VFR BLD CALC: 30.2 % (ref 37–47)
HCT VFR BLD CALC: 31 % (ref 37–47)
HCT VFR BLD CALC: 31.4 % (ref 37–47)
HCT VFR BLD CALC: 31.5 % (ref 37–47)
HCT VFR BLD CALC: 31.8 % (ref 37–47)
HCT VFR BLD CALC: 32.9 % (ref 37–47)
HCT VFR BLD CALC: 33.4 % (ref 37–47)
HCT VFR BLD CALC: 34.6 % (ref 37–47)
HCT VFR BLD CALC: 37.9 % (ref 37–47)
HCT VFR BLD CALC: 39.9 % (ref 37–47)
HCT VFR BLD CALC: 39.9 % (ref 37–47)
HCT VFR BLD CALC: 40.2 % (ref 37–47)
HCT VFR BLD CALC: 40.6 % (ref 37–47)
HCT VFR BLD CALC: 41.9 % (ref 37–47)
HCT VFR BLD CALC: 42.2 % (ref 37–47)
HCT VFR BLD CALC: 43 % (ref 37–47)
HCT VFR BLD CALC: 43.2 % (ref 37–47)
HCT VFR BLD CALC: 43.9 % (ref 37–47)
HEMOGLOBIN: 10 GM/DL (ref 12.5–16)
HEMOGLOBIN: 10.1 GM/DL (ref 12.5–16)
HEMOGLOBIN: 10.4 GM/DL (ref 12.5–16)
HEMOGLOBIN: 10.4 GM/DL (ref 12.5–16)
HEMOGLOBIN: 10.7 GM/DL (ref 12.5–16)
HEMOGLOBIN: 11.3 GM/DL (ref 12.5–16)
HEMOGLOBIN: 11.5 GM/DL (ref 12.5–16)
HEMOGLOBIN: 11.7 GM/DL (ref 12.5–16)
HEMOGLOBIN: 13.4 GM/DL (ref 12.5–16)
HEMOGLOBIN: 13.4 GM/DL (ref 12.5–16)
HEMOGLOBIN: 13.5 GM/DL (ref 12.5–16)
HEMOGLOBIN: 13.6 GM/DL (ref 12.5–16)
HEMOGLOBIN: 14.5 GM/DL (ref 12.5–16)
HEMOGLOBIN: 14.6 GM/DL (ref 12.5–16)
HEMOGLOBIN: 14.7 GM/DL (ref 12.5–16)
HEMOGLOBIN: 8.6 GM/DL (ref 12.5–16)
HEMOGLOBIN: 9.6 GM/DL (ref 12.5–16)
HEMOGLOBIN: 9.7 GM/DL (ref 12.5–16)
HIGH SENSITIVE C-REACTIVE PROTEIN: 11.7 MG/L
HIGH SENSITIVE C-REACTIVE PROTEIN: 152.9 MG/L
HIGH SENSITIVE C-REACTIVE PROTEIN: 178.4 MG/L
HIGH SENSITIVE C-REACTIVE PROTEIN: 47.5 MG/L
HIGH SENSITIVE C-REACTIVE PROTEIN: 62.6 MG/L
HIGH SENSITIVE C-REACTIVE PROTEIN: 89.4 MG/L
HYALINE CASTS: 0 /LPF
HYALINE CASTS: 10 /LPF
IMMATURE NEUTROPHIL %: 1.1 % (ref 0–0.43)
IMMATURE NEUTROPHIL %: 1.5 % (ref 0–0.43)
IMMATURE NEUTROPHIL %: 1.8 % (ref 0–0.43)
IMMATURE NEUTROPHIL %: 1.9 % (ref 0–0.43)
IMMATURE NEUTROPHIL %: 1.9 % (ref 0–0.43)
INR BLD: 0.88 INDEX
INR BLD: 1.05 INDEX
KETONES, URINE: NEGATIVE MG/DL
KETONES, URINE: NEGATIVE MG/DL
LACTATE DEHYDROGENASE: 340 IU/L (ref 120–246)
LACTATE: 1.5 MMOL/L (ref 0.4–2)
LACTIC ACID, SEPSIS: 2.2 MMOL/L (ref 0.5–1.9)
LEGIONELLA URINARY AG: NEGATIVE
LEUKOCYTE ESTERASE, URINE: ABNORMAL
LEUKOCYTE ESTERASE, URINE: NEGATIVE
LV EF: 53 %
LVEF MODALITY: NORMAL
LYMPHOCYTES ABSOLUTE: 0.2 K/CU MM
LYMPHOCYTES ABSOLUTE: 0.3 K/CU MM
LYMPHOCYTES ABSOLUTE: 0.3 K/CU MM
LYMPHOCYTES ABSOLUTE: 0.4 K/CU MM
LYMPHOCYTES ABSOLUTE: 0.5 K/CU MM
LYMPHOCYTES ABSOLUTE: 0.6 K/CU MM
LYMPHOCYTES ABSOLUTE: 0.8 K/CU MM
LYMPHOCYTES ABSOLUTE: 1.3 K/CU MM
LYMPHOCYTES ABSOLUTE: 1.5 K/CU MM
LYMPHOCYTES ABSOLUTE: 1.9 K/CU MM
LYMPHOCYTES ABSOLUTE: 13.1 K/CU MM
LYMPHOCYTES RELATIVE PERCENT: 1 % (ref 24–44)
LYMPHOCYTES RELATIVE PERCENT: 1.6 % (ref 24–44)
LYMPHOCYTES RELATIVE PERCENT: 1.9 % (ref 24–44)
LYMPHOCYTES RELATIVE PERCENT: 2.9 % (ref 24–44)
LYMPHOCYTES RELATIVE PERCENT: 21 % (ref 24–44)
LYMPHOCYTES RELATIVE PERCENT: 3 % (ref 24–44)
LYMPHOCYTES RELATIVE PERCENT: 3 % (ref 24–44)
LYMPHOCYTES RELATIVE PERCENT: 4.4 % (ref 24–44)
LYMPHOCYTES RELATIVE PERCENT: 5 % (ref 24–44)
LYMPHOCYTES RELATIVE PERCENT: 6 % (ref 24–44)
LYMPHOCYTES RELATIVE PERCENT: 7 % (ref 24–44)
LYMPHOCYTES RELATIVE PERCENT: 7 % (ref 24–44)
LYMPHOCYTES RELATIVE PERCENT: 8 % (ref 24–44)
Lab: NORMAL
MCH RBC QN AUTO: 30.4 PG (ref 27–31)
MCH RBC QN AUTO: 30.8 PG (ref 27–31)
MCH RBC QN AUTO: 30.9 PG (ref 27–31)
MCH RBC QN AUTO: 30.9 PG (ref 27–31)
MCH RBC QN AUTO: 31 PG (ref 27–31)
MCH RBC QN AUTO: 31.1 PG (ref 27–31)
MCH RBC QN AUTO: 31.2 PG (ref 27–31)
MCH RBC QN AUTO: 31.2 PG (ref 27–31)
MCH RBC QN AUTO: 31.3 PG (ref 27–31)
MCH RBC QN AUTO: 31.4 PG (ref 27–31)
MCH RBC QN AUTO: 31.7 PG (ref 27–31)
MCH RBC QN AUTO: 31.9 PG (ref 27–31)
MCH RBC QN AUTO: 32.7 PG (ref 27–31)
MCHC RBC AUTO-ENTMCNC: 29.8 % (ref 32–36)
MCHC RBC AUTO-ENTMCNC: 31.8 % (ref 32–36)
MCHC RBC AUTO-ENTMCNC: 31.9 % (ref 32–36)
MCHC RBC AUTO-ENTMCNC: 32.1 % (ref 32–36)
MCHC RBC AUTO-ENTMCNC: 32.1 % (ref 32–36)
MCHC RBC AUTO-ENTMCNC: 32.3 % (ref 32–36)
MCHC RBC AUTO-ENTMCNC: 32.5 % (ref 32–36)
MCHC RBC AUTO-ENTMCNC: 32.7 % (ref 32–36)
MCHC RBC AUTO-ENTMCNC: 33.1 % (ref 32–36)
MCHC RBC AUTO-ENTMCNC: 33.2 % (ref 32–36)
MCHC RBC AUTO-ENTMCNC: 33.3 % (ref 32–36)
MCHC RBC AUTO-ENTMCNC: 33.5 % (ref 32–36)
MCHC RBC AUTO-ENTMCNC: 33.5 % (ref 32–36)
MCHC RBC AUTO-ENTMCNC: 33.6 % (ref 32–36)
MCHC RBC AUTO-ENTMCNC: 33.6 % (ref 32–36)
MCHC RBC AUTO-ENTMCNC: 33.8 % (ref 32–36)
MCHC RBC AUTO-ENTMCNC: 34 % (ref 32–36)
MCHC RBC AUTO-ENTMCNC: 34.4 % (ref 32–36)
MCHC RBC AUTO-ENTMCNC: 34.6 % (ref 32–36)
MCHC RBC AUTO-ENTMCNC: 35 % (ref 32–36)
MCV RBC AUTO: 104.4 FL (ref 78–100)
MCV RBC AUTO: 90.2 FL (ref 78–100)
MCV RBC AUTO: 91.5 FL (ref 78–100)
MCV RBC AUTO: 92.3 FL (ref 78–100)
MCV RBC AUTO: 92.5 FL (ref 78–100)
MCV RBC AUTO: 92.8 FL (ref 78–100)
MCV RBC AUTO: 93 FL (ref 78–100)
MCV RBC AUTO: 93.2 FL (ref 78–100)
MCV RBC AUTO: 93.3 FL (ref 78–100)
MCV RBC AUTO: 93.8 FL (ref 78–100)
MCV RBC AUTO: 94.1 FL (ref 78–100)
MCV RBC AUTO: 94.4 FL (ref 78–100)
MCV RBC AUTO: 94.6 FL (ref 78–100)
MCV RBC AUTO: 95.4 FL (ref 78–100)
MCV RBC AUTO: 95.6 FL (ref 78–100)
MCV RBC AUTO: 95.8 FL (ref 78–100)
MCV RBC AUTO: 96 FL (ref 78–100)
MCV RBC AUTO: 96.5 FL (ref 78–100)
MCV RBC AUTO: 96.9 FL (ref 78–100)
MCV RBC AUTO: 97.1 FL (ref 78–100)
METAMYELOCYTES ABSOLUTE COUNT: 0.09 K/CU MM
METAMYELOCYTES ABSOLUTE COUNT: 0.2 K/CU MM
METAMYELOCYTES ABSOLUTE COUNT: 0.24 K/CU MM
METAMYELOCYTES ABSOLUTE COUNT: 0.25 K/CU MM
METAMYELOCYTES ABSOLUTE COUNT: 0.26 K/CU MM
METAMYELOCYTES ABSOLUTE COUNT: 0.35 K/CU MM
METAMYELOCYTES ABSOLUTE COUNT: 0.46 K/CU MM
METAMYELOCYTES ABSOLUTE COUNT: 0.63 K/CU MM
METAMYELOCYTES ABSOLUTE COUNT: 0.8 K/CU MM
METAMYELOCYTES ABSOLUTE COUNT: 1.1 K/CU MM
METAMYELOCYTES ABSOLUTE COUNT: 1.25 K/CU MM
METAMYELOCYTES PERCENT: 1 %
METAMYELOCYTES PERCENT: 2 %
METAMYELOCYTES PERCENT: 3 %
METAMYELOCYTES PERCENT: 3 %
METAMYELOCYTES PERCENT: 4 %
METAMYELOCYTES PERCENT: 4 %
METHEMOGLOBIN ARTERIAL: 0.9 %
METHEMOGLOBIN ARTERIAL: 1 %
METHEMOGLOBIN ARTERIAL: 1.1 %
METHEMOGLOBIN ARTERIAL: 1.2 %
METHEMOGLOBIN ARTERIAL: 1.2 %
METHEMOGLOBIN ARTERIAL: 1.3 %
METHEMOGLOBIN ARTERIAL: 1.3 %
METHEMOGLOBIN ARTERIAL: 1.4 %
METHEMOGLOBIN ARTERIAL: 1.5 %
METHEMOGLOBIN ARTERIAL: 1.7 %
MONOCYTES ABSOLUTE: 0.2 K/CU MM
MONOCYTES ABSOLUTE: 0.3 K/CU MM
MONOCYTES ABSOLUTE: 0.3 K/CU MM
MONOCYTES ABSOLUTE: 0.4 K/CU MM
MONOCYTES ABSOLUTE: 0.5 K/CU MM
MONOCYTES ABSOLUTE: 0.5 K/CU MM
MONOCYTES ABSOLUTE: 0.7 K/CU MM
MONOCYTES ABSOLUTE: 0.7 K/CU MM
MONOCYTES ABSOLUTE: 0.8 K/CU MM
MONOCYTES ABSOLUTE: 1 K/CU MM
MONOCYTES ABSOLUTE: 1 K/CU MM
MONOCYTES ABSOLUTE: 1.1 K/CU MM
MONOCYTES ABSOLUTE: 1.4 K/CU MM
MONOCYTES ABSOLUTE: 1.6 K/CU MM
MONOCYTES ABSOLUTE: 1.9 K/CU MM
MONOCYTES RELATIVE PERCENT: 1 % (ref 0–4)
MONOCYTES RELATIVE PERCENT: 1.5 % (ref 0–4)
MONOCYTES RELATIVE PERCENT: 1.6 % (ref 0–4)
MONOCYTES RELATIVE PERCENT: 2 % (ref 0–4)
MONOCYTES RELATIVE PERCENT: 2.3 % (ref 0–4)
MONOCYTES RELATIVE PERCENT: 2.4 % (ref 0–4)
MONOCYTES RELATIVE PERCENT: 3 % (ref 0–4)
MONOCYTES RELATIVE PERCENT: 3 % (ref 0–4)
MONOCYTES RELATIVE PERCENT: 4 % (ref 0–4)
MONOCYTES RELATIVE PERCENT: 5 % (ref 0–4)
MONOCYTES RELATIVE PERCENT: 5.1 % (ref 0–4)
MONOCYTES RELATIVE PERCENT: 6 % (ref 0–4)
MONOCYTES RELATIVE PERCENT: 7 % (ref 0–4)
MONOCYTES RELATIVE PERCENT: 8 % (ref 0–4)
MONOCYTES RELATIVE PERCENT: 8 % (ref 0–4)
MUCUS: ABNORMAL HPF
MUCUS: ABNORMAL HPF
MYELOCYTE PERCENT: 1 %
MYELOCYTE PERCENT: 2 %
MYELOCYTE PERCENT: 4 %
MYELOCYTES ABSOLUTE COUNT: 0.21 K/CU MM
MYELOCYTES ABSOLUTE COUNT: 0.26 K/CU MM
MYELOCYTES ABSOLUTE COUNT: 0.27 K/CU MM
MYELOCYTES ABSOLUTE COUNT: 0.28 K/CU MM
MYELOCYTES ABSOLUTE COUNT: 0.51 K/CU MM
MYELOCYTES ABSOLUTE COUNT: 2.5 K/CU MM
NITRITE URINE, QUANTITATIVE: NEGATIVE
NITRITE URINE, QUANTITATIVE: NEGATIVE
NUCLEATED RBC %: 0 %
NUCLEATED RED BLOOD CELLS: 1
NUCLEATED RED BLOOD CELLS: 2
O2 SAT, VEN: 86.1 % (ref 50–70)
O2 SATURATION: 49.9 % (ref 96–97)
O2 SATURATION: 82.8 % (ref 96–97)
O2 SATURATION: 89.4 % (ref 96–97)
O2 SATURATION: 90.5 % (ref 96–97)
O2 SATURATION: 91.1 % (ref 96–97)
O2 SATURATION: 93.9 % (ref 96–97)
O2 SATURATION: 94 % (ref 96–97)
O2 SATURATION: 94.1 % (ref 96–97)
O2 SATURATION: 94.2 % (ref 96–97)
O2 SATURATION: 94.4 % (ref 96–97)
O2 SATURATION: 94.9 % (ref 96–97)
O2 SATURATION: 95.8 % (ref 96–97)
O2 SATURATION: 96 % (ref 96–97)
O2 SATURATION: 96.7 % (ref 96–97)
PCO2 ARTERIAL: 36 MMHG (ref 32–45)
PCO2 ARTERIAL: 36 MMHG (ref 32–45)
PCO2 ARTERIAL: 41 MMHG (ref 32–45)
PCO2 ARTERIAL: 44 MMHG (ref 32–45)
PCO2 ARTERIAL: 45 MMHG (ref 32–45)
PCO2 ARTERIAL: 47 MMHG (ref 32–45)
PCO2 ARTERIAL: 49 MMHG (ref 32–45)
PCO2 ARTERIAL: 50 MMHG (ref 32–45)
PCO2 ARTERIAL: 50 MMHG (ref 32–45)
PCO2 ARTERIAL: 55 MMHG (ref 32–45)
PCO2 ARTERIAL: 62 MMHG (ref 32–45)
PCO2 ARTERIAL: 67 MMHG (ref 32–45)
PCO2 ARTERIAL: 69 MMHG (ref 32–45)
PCO2 ARTERIAL: 95 MMHG (ref 32–45)
PCO2, VEN: 37 MMHG (ref 38–52)
PDW BLD-RTO: 12 % (ref 11.7–14.9)
PDW BLD-RTO: 12 % (ref 11.7–14.9)
PDW BLD-RTO: 12.1 % (ref 11.7–14.9)
PDW BLD-RTO: 12.2 % (ref 11.7–14.9)
PDW BLD-RTO: 12.2 % (ref 11.7–14.9)
PDW BLD-RTO: 12.3 % (ref 11.7–14.9)
PDW BLD-RTO: 12.3 % (ref 11.7–14.9)
PDW BLD-RTO: 12.4 % (ref 11.7–14.9)
PDW BLD-RTO: 12.5 % (ref 11.7–14.9)
PDW BLD-RTO: 12.6 % (ref 11.7–14.9)
PDW BLD-RTO: 12.9 % (ref 11.7–14.9)
PDW BLD-RTO: 13.1 % (ref 11.7–14.9)
PDW BLD-RTO: 13.1 % (ref 11.7–14.9)
PDW BLD-RTO: 13.2 % (ref 11.7–14.9)
PDW BLD-RTO: 13.8 % (ref 11.7–14.9)
PH BLOOD: 7 (ref 7.34–7.45)
PH BLOOD: 7.17 (ref 7.34–7.45)
PH BLOOD: 7.28 (ref 7.34–7.45)
PH BLOOD: 7.34 (ref 7.34–7.45)
PH BLOOD: 7.35 (ref 7.34–7.45)
PH BLOOD: 7.36 (ref 7.34–7.45)
PH BLOOD: 7.37 (ref 7.34–7.45)
PH BLOOD: 7.38 (ref 7.34–7.45)
PH BLOOD: 7.39 (ref 7.34–7.45)
PH BLOOD: 7.42 (ref 7.34–7.45)
PH BLOOD: 7.42 (ref 7.34–7.45)
PH BLOOD: 7.45 (ref 7.34–7.45)
PH BLOOD: 7.45 (ref 7.34–7.45)
PH BLOOD: 7.48 (ref 7.34–7.45)
PH VENOUS: 7.45 (ref 7.32–7.42)
PH, URINE: 5 (ref 5–8)
PH, URINE: 6 (ref 5–8)
PLATELET # BLD: 122 K/CU MM (ref 140–440)
PLATELET # BLD: 132 K/CU MM (ref 140–440)
PLATELET # BLD: 132 K/CU MM (ref 140–440)
PLATELET # BLD: 142 K/CU MM (ref 140–440)
PLATELET # BLD: 144 K/CU MM (ref 140–440)
PLATELET # BLD: 148 K/CU MM (ref 140–440)
PLATELET # BLD: 174 K/CU MM (ref 140–440)
PLATELET # BLD: 183 K/CU MM (ref 140–440)
PLATELET # BLD: 192 K/CU MM (ref 140–440)
PLATELET # BLD: 201 K/CU MM (ref 140–440)
PLATELET # BLD: 204 K/CU MM (ref 140–440)
PLATELET # BLD: 217 K/CU MM (ref 140–440)
PLATELET # BLD: 242 K/CU MM (ref 140–440)
PLATELET # BLD: 244 K/CU MM (ref 140–440)
PLATELET # BLD: 275 K/CU MM (ref 140–440)
PLATELET # BLD: 280 K/CU MM (ref 140–440)
PLATELET # BLD: 297 K/CU MM (ref 140–440)
PLATELET # BLD: 303 K/CU MM (ref 140–440)
PLATELET # BLD: 310 K/CU MM (ref 140–440)
PLATELET # BLD: 376 K/CU MM (ref 140–440)
PLT MORPHOLOGY: ABNORMAL
PMV BLD AUTO: 10 FL (ref 7.5–11.1)
PMV BLD AUTO: 10.2 FL (ref 7.5–11.1)
PMV BLD AUTO: 10.5 FL (ref 7.5–11.1)
PMV BLD AUTO: 10.5 FL (ref 7.5–11.1)
PMV BLD AUTO: 10.6 FL (ref 7.5–11.1)
PMV BLD AUTO: 10.7 FL (ref 7.5–11.1)
PMV BLD AUTO: 10.7 FL (ref 7.5–11.1)
PMV BLD AUTO: 10.8 FL (ref 7.5–11.1)
PMV BLD AUTO: 10.8 FL (ref 7.5–11.1)
PMV BLD AUTO: 11 FL (ref 7.5–11.1)
PMV BLD AUTO: 11.2 FL (ref 7.5–11.1)
PMV BLD AUTO: 11.3 FL (ref 7.5–11.1)
PMV BLD AUTO: 11.3 FL (ref 7.5–11.1)
PMV BLD AUTO: 9.4 FL (ref 7.5–11.1)
PMV BLD AUTO: 9.6 FL (ref 7.5–11.1)
PMV BLD AUTO: 9.7 FL (ref 7.5–11.1)
PO2 ARTERIAL: 102 MMHG (ref 75–100)
PO2 ARTERIAL: 103 MMHG (ref 75–100)
PO2 ARTERIAL: 154 MMHG (ref 75–100)
PO2 ARTERIAL: 36 MMHG (ref 75–100)
PO2 ARTERIAL: 55 MMHG (ref 75–100)
PO2 ARTERIAL: 59 MMHG (ref 75–100)
PO2 ARTERIAL: 69 MMHG (ref 75–100)
PO2 ARTERIAL: 72 MMHG (ref 75–100)
PO2 ARTERIAL: 80 MMHG (ref 75–100)
PO2 ARTERIAL: 83 MMHG (ref 75–100)
PO2 ARTERIAL: 84 MMHG (ref 75–100)
PO2 ARTERIAL: 85 MMHG (ref 75–100)
PO2 ARTERIAL: 86 MMHG (ref 75–100)
PO2 ARTERIAL: 90 MMHG (ref 75–100)
PO2, VEN: 51 MMHG (ref 28–48)
POLYCHROMASIA: ABNORMAL
POTASSIUM SERPL-SCNC: 3.5 MMOL/L (ref 3.5–5.1)
POTASSIUM SERPL-SCNC: 3.7 MMOL/L (ref 3.5–5.1)
POTASSIUM SERPL-SCNC: 3.8 MMOL/L (ref 3.5–5.1)
POTASSIUM SERPL-SCNC: 4 MMOL/L (ref 3.5–5.1)
POTASSIUM SERPL-SCNC: 4 MMOL/L (ref 3.5–5.1)
POTASSIUM SERPL-SCNC: 4.2 MMOL/L (ref 3.5–5.1)
POTASSIUM SERPL-SCNC: 4.3 MMOL/L (ref 3.5–5.1)
POTASSIUM SERPL-SCNC: 4.4 MMOL/L (ref 3.5–5.1)
POTASSIUM SERPL-SCNC: 4.5 MMOL/L (ref 3.5–5.1)
POTASSIUM SERPL-SCNC: 4.6 MMOL/L (ref 3.5–5.1)
POTASSIUM SERPL-SCNC: 4.7 MMOL/L (ref 3.5–5.1)
POTASSIUM SERPL-SCNC: 4.7 MMOL/L (ref 3.5–5.1)
POTASSIUM SERPL-SCNC: 4.8 MMOL/L (ref 3.5–5.1)
POTASSIUM SERPL-SCNC: 4.9 MMOL/L (ref 3.5–5.1)
POTASSIUM SERPL-SCNC: 5 MMOL/L (ref 3.5–5.1)
PRO-BNP: 120.1 PG/ML
PROCALCITONIN: 0.05
PROCALCITONIN: 0.07
PROCALCITONIN: 0.09
PROCALCITONIN: 0.12
PROCALCITONIN: 0.14
PROCALCITONIN: 0.21
PROCALCITONIN: 0.27
PROCALCITONIN: 0.62
PROCALCITONIN: 0.65
PROCALCITONIN: 1.93
PROMYELOCYTES ABSOLUTE COUNT: 0.51 K/CU MM
PROMYELOCYTES ABSOLUTE COUNT: 1.25 K/CU MM
PROMYELOCYTES PERCENT: 2 %
PROMYELOCYTES PERCENT: 2 %
PROTEIN UA: 100 MG/DL
PROTEIN UA: NEGATIVE MG/DL
PROTHROMBIN TIME: 11.4 SECONDS (ref 11.7–14.5)
PROTHROMBIN TIME: 13.6 SECONDS (ref 11.7–14.5)
QUANTI TB1 MINUS NIL: 0 IU/ML (ref 0–0.34)
QUANTI TB2 MINUS NIL: 0 IU/ML (ref 0–0.34)
QUANTIFERON (R) TB GOLD (INCUBATED): ABNORMAL IU/ML
QUANTIFERON MITOGEN MINUS NIL: 0.16 IU/ML
QUANTIFERON NIL: 0.01 IU/ML
RBC # BLD: 2.78 M/CU MM (ref 4.2–5.4)
RBC # BLD: 3.13 M/CU MM (ref 4.2–5.4)
RBC # BLD: 3.16 M/CU MM (ref 4.2–5.4)
RBC # BLD: 3.2 M/CU MM (ref 4.2–5.4)
RBC # BLD: 3.28 M/CU MM (ref 4.2–5.4)
RBC # BLD: 3.32 M/CU MM (ref 4.2–5.4)
RBC # BLD: 3.37 M/CU MM (ref 4.2–5.4)
RBC # BLD: 3.45 M/CU MM (ref 4.2–5.4)
RBC # BLD: 3.58 M/CU MM (ref 4.2–5.4)
RBC # BLD: 3.61 M/CU MM (ref 4.2–5.4)
RBC # BLD: 3.63 M/CU MM (ref 4.2–5.4)
RBC # BLD: 4.27 M/CU MM (ref 4.2–5.4)
RBC # BLD: 4.29 M/CU MM (ref 4.2–5.4)
RBC # BLD: 4.3 M/CU MM (ref 4.2–5.4)
RBC # BLD: 4.33 M/CU MM (ref 4.2–5.4)
RBC # BLD: 4.58 M/CU MM (ref 4.2–5.4)
RBC # BLD: 4.66 M/CU MM (ref 4.2–5.4)
RBC # BLD: 4.67 M/CU MM (ref 4.2–5.4)
RBC # BLD: 4.68 M/CU MM (ref 4.2–5.4)
RBC # BLD: 4.71 M/CU MM (ref 4.2–5.4)
RBC # BLD: ABNORMAL 10*6/UL
RBC URINE: 17 /HPF (ref 0–6)
RBC URINE: 37 /HPF (ref 0–6)
REASON FOR REJECTION: NORMAL
REJECTED TEST: NORMAL
SARS-COV-2, NAAT: DETECTED
SEGMENTED NEUTROPHILS ABSOLUTE COUNT: 12.4 K/CU MM
SEGMENTED NEUTROPHILS ABSOLUTE COUNT: 12.8 K/CU MM
SEGMENTED NEUTROPHILS ABSOLUTE COUNT: 13.2 K/CU MM
SEGMENTED NEUTROPHILS ABSOLUTE COUNT: 14 K/CU MM
SEGMENTED NEUTROPHILS ABSOLUTE COUNT: 15.6 K/CU MM
SEGMENTED NEUTROPHILS ABSOLUTE COUNT: 15.6 K/CU MM
SEGMENTED NEUTROPHILS ABSOLUTE COUNT: 16.8 K/CU MM
SEGMENTED NEUTROPHILS ABSOLUTE COUNT: 17.4 K/CU MM
SEGMENTED NEUTROPHILS ABSOLUTE COUNT: 18.1 K/CU MM
SEGMENTED NEUTROPHILS ABSOLUTE COUNT: 18.2 K/CU MM
SEGMENTED NEUTROPHILS ABSOLUTE COUNT: 18.7 K/CU MM
SEGMENTED NEUTROPHILS ABSOLUTE COUNT: 19.2 K/CU MM
SEGMENTED NEUTROPHILS ABSOLUTE COUNT: 19.4 K/CU MM
SEGMENTED NEUTROPHILS ABSOLUTE COUNT: 19.5 K/CU MM
SEGMENTED NEUTROPHILS ABSOLUTE COUNT: 21.7 K/CU MM
SEGMENTED NEUTROPHILS ABSOLUTE COUNT: 22.7 K/CU MM
SEGMENTED NEUTROPHILS ABSOLUTE COUNT: 24.4 K/CU MM
SEGMENTED NEUTROPHILS ABSOLUTE COUNT: 4.3 K/CU MM
SEGMENTED NEUTROPHILS ABSOLUTE COUNT: 4.6 K/CU MM
SEGMENTED NEUTROPHILS ABSOLUTE COUNT: 7.3 K/CU MM
SEGMENTED NEUTROPHILS RELATIVE PERCENT: 39 % (ref 36–66)
SEGMENTED NEUTROPHILS RELATIVE PERCENT: 57 % (ref 36–66)
SEGMENTED NEUTROPHILS RELATIVE PERCENT: 67 % (ref 36–66)
SEGMENTED NEUTROPHILS RELATIVE PERCENT: 70 % (ref 36–66)
SEGMENTED NEUTROPHILS RELATIVE PERCENT: 70 % (ref 36–66)
SEGMENTED NEUTROPHILS RELATIVE PERCENT: 73 % (ref 36–66)
SEGMENTED NEUTROPHILS RELATIVE PERCENT: 75 % (ref 36–66)
SEGMENTED NEUTROPHILS RELATIVE PERCENT: 75 % (ref 36–66)
SEGMENTED NEUTROPHILS RELATIVE PERCENT: 76 % (ref 36–66)
SEGMENTED NEUTROPHILS RELATIVE PERCENT: 78 % (ref 36–66)
SEGMENTED NEUTROPHILS RELATIVE PERCENT: 81 % (ref 36–66)
SEGMENTED NEUTROPHILS RELATIVE PERCENT: 83 % (ref 36–66)
SEGMENTED NEUTROPHILS RELATIVE PERCENT: 86 % (ref 36–66)
SEGMENTED NEUTROPHILS RELATIVE PERCENT: 86 % (ref 36–66)
SEGMENTED NEUTROPHILS RELATIVE PERCENT: 89 % (ref 36–66)
SEGMENTED NEUTROPHILS RELATIVE PERCENT: 90.8 % (ref 36–66)
SEGMENTED NEUTROPHILS RELATIVE PERCENT: 90.8 % (ref 36–66)
SEGMENTED NEUTROPHILS RELATIVE PERCENT: 92.9 % (ref 36–66)
SEGMENTED NEUTROPHILS RELATIVE PERCENT: 94.4 % (ref 36–66)
SEGMENTED NEUTROPHILS RELATIVE PERCENT: 96.1 % (ref 36–66)
SODIUM BLD-SCNC: 132 MMOL/L (ref 135–145)
SODIUM BLD-SCNC: 133 MMOL/L (ref 135–145)
SODIUM BLD-SCNC: 133 MMOL/L (ref 135–145)
SODIUM BLD-SCNC: 134 MMOL/L (ref 135–145)
SODIUM BLD-SCNC: 135 MMOL/L (ref 135–145)
SODIUM BLD-SCNC: 136 MMOL/L (ref 135–145)
SODIUM BLD-SCNC: 138 MMOL/L (ref 135–145)
SODIUM BLD-SCNC: 139 MMOL/L (ref 135–145)
SODIUM BLD-SCNC: 140 MMOL/L (ref 135–145)
SOURCE: ABNORMAL
SPECIFIC GRAVITY UA: 1.02 (ref 1–1.03)
SPECIFIC GRAVITY UA: 1.03 (ref 1–1.03)
SPECIMEN: NORMAL
SQUAMOUS EPITHELIAL: <1 /HPF
STREP PNEUMONIAE ANTIGEN: NORMAL
TOTAL IMMATURE NEUTOROPHIL: 0.24 K/CU MM
TOTAL IMMATURE NEUTOROPHIL: 0.25 K/CU MM
TOTAL IMMATURE NEUTOROPHIL: 0.3 K/CU MM
TOTAL IMMATURE NEUTOROPHIL: 0.33 K/CU MM
TOTAL IMMATURE NEUTOROPHIL: 0.39 K/CU MM
TOTAL NUCLEATED RBC: 0 K/CU MM
TOTAL PROTEIN: 4.8 GM/DL (ref 6.4–8.2)
TOTAL PROTEIN: 5.1 GM/DL (ref 6.4–8.2)
TOTAL PROTEIN: 5.2 GM/DL (ref 6.4–8.2)
TOTAL PROTEIN: 5.3 GM/DL (ref 6.4–8.2)
TOTAL PROTEIN: 5.4 GM/DL (ref 6.4–8.2)
TOTAL PROTEIN: 5.6 GM/DL (ref 6.4–8.2)
TOTAL PROTEIN: 5.6 GM/DL (ref 6.4–8.2)
TOTAL PROTEIN: 6 GM/DL (ref 6.4–8.2)
TOTAL PROTEIN: 6.1 GM/DL (ref 6.4–8.2)
TOTAL PROTEIN: 6.7 GM/DL (ref 6.4–8.2)
TOXIC GRANULATION: PRESENT
TRANSITIONAL EPITHELIAL: 1 /HPF
TRICHOMONAS: ABNORMAL /HPF
TRICHOMONAS: ABNORMAL /HPF
TROPONIN T: <0.01 NG/ML
TROPONIN T: <0.01 NG/ML
UROBILINOGEN, URINE: NORMAL MG/DL (ref 0.2–1)
UROBILINOGEN, URINE: NORMAL MG/DL (ref 0.2–1)
VANCOMYCIN RANDOM: 11.5 UG/ML
VANCOMYCIN TROUGH: 15.2 UG/ML (ref 10–20)
VITAMIN D 25-HYDROXY: 40.98 NG/ML
WBC # BLD: 13.4 K/CU MM (ref 4–10.5)
WBC # BLD: 14.3 K/CU MM (ref 4–10.5)
WBC # BLD: 17.1 K/CU MM (ref 4–10.5)
WBC # BLD: 17.6 K/CU MM (ref 4–10.5)
WBC # BLD: 20.5 K/CU MM (ref 4–10.5)
WBC # BLD: 20.6 K/CU MM (ref 4–10.5)
WBC # BLD: 20.9 K/CU MM (ref 4–10.5)
WBC # BLD: 21.9 K/CU MM (ref 4–10.5)
WBC # BLD: 22.9 K/CU MM (ref 4–10.5)
WBC # BLD: 23.6 K/CU MM (ref 4–10.5)
WBC # BLD: 23.9 K/CU MM (ref 4–10.5)
WBC # BLD: 24.6 K/CU MM (ref 4–10.5)
WBC # BLD: 25.3 K/CU MM (ref 4–10.5)
WBC # BLD: 25.7 K/CU MM (ref 4–10.5)
WBC # BLD: 26.8 K/CU MM (ref 4–10.5)
WBC # BLD: 27.5 K/CU MM (ref 4–10.5)
WBC # BLD: 5.1 K/CU MM (ref 4–10.5)
WBC # BLD: 6.2 K/CU MM (ref 4–10.5)
WBC # BLD: 62.5 K/CU MM (ref 4–10.5)
WBC # BLD: 9 K/CU MM (ref 4–10.5)
WBC # BLD: ABNORMAL 10*3/UL
WBC UA: 1 /HPF (ref 0–5)
WBC UA: 2 /HPF (ref 0–5)

## 2021-01-01 PROCEDURE — 99233 SBSQ HOSP IP/OBS HIGH 50: CPT | Performed by: INTERNAL MEDICINE

## 2021-01-01 PROCEDURE — 6360000002 HC RX W HCPCS: Performed by: INTERNAL MEDICINE

## 2021-01-01 PROCEDURE — 6370000000 HC RX 637 (ALT 250 FOR IP): Performed by: INTERNAL MEDICINE

## 2021-01-01 PROCEDURE — 80053 COMPREHEN METABOLIC PANEL: CPT

## 2021-01-01 PROCEDURE — 2060000000 HC ICU INTERMEDIATE R&B

## 2021-01-01 PROCEDURE — 94003 VENT MGMT INPAT SUBQ DAY: CPT

## 2021-01-01 PROCEDURE — 2580000003 HC RX 258: Performed by: INTERNAL MEDICINE

## 2021-01-01 PROCEDURE — 36600 WITHDRAWAL OF ARTERIAL BLOOD: CPT

## 2021-01-01 PROCEDURE — 94640 AIRWAY INHALATION TREATMENT: CPT

## 2021-01-01 PROCEDURE — 2500000003 HC RX 250 WO HCPCS: Performed by: INTERNAL MEDICINE

## 2021-01-01 PROCEDURE — 6360000004 HC RX CONTRAST MEDICATION: Performed by: PHYSICIAN ASSISTANT

## 2021-01-01 PROCEDURE — 82803 BLOOD GASES ANY COMBINATION: CPT

## 2021-01-01 PROCEDURE — 36415 COLL VENOUS BLD VENIPUNCTURE: CPT

## 2021-01-01 PROCEDURE — 71045 X-RAY EXAM CHEST 1 VIEW: CPT

## 2021-01-01 PROCEDURE — 87205 SMEAR GRAM STAIN: CPT

## 2021-01-01 PROCEDURE — 2500000003 HC RX 250 WO HCPCS: Performed by: NURSE PRACTITIONER

## 2021-01-01 PROCEDURE — 94660 CPAP INITIATION&MGMT: CPT

## 2021-01-01 PROCEDURE — 93005 ELECTROCARDIOGRAM TRACING: CPT | Performed by: INTERNAL MEDICINE

## 2021-01-01 PROCEDURE — 85007 BL SMEAR W/DIFF WBC COUNT: CPT

## 2021-01-01 PROCEDURE — 2500000003 HC RX 250 WO HCPCS

## 2021-01-01 PROCEDURE — 85027 COMPLETE CBC AUTOMATED: CPT

## 2021-01-01 PROCEDURE — 82565 ASSAY OF CREATININE: CPT

## 2021-01-01 PROCEDURE — 2700000000 HC OXYGEN THERAPY PER DAY

## 2021-01-01 PROCEDURE — 2580000003 HC RX 258: Performed by: HOSPITALIST

## 2021-01-01 PROCEDURE — 6360000002 HC RX W HCPCS: Performed by: NURSE PRACTITIONER

## 2021-01-01 PROCEDURE — 89220 SPUTUM SPECIMEN COLLECTION: CPT

## 2021-01-01 PROCEDURE — 2000000000 HC ICU R&B

## 2021-01-01 PROCEDURE — 85025 COMPLETE CBC W/AUTO DIFF WBC: CPT

## 2021-01-01 PROCEDURE — 84145 PROCALCITONIN (PCT): CPT

## 2021-01-01 PROCEDURE — 99232 SBSQ HOSP IP/OBS MODERATE 35: CPT | Performed by: INTERNAL MEDICINE

## 2021-01-01 PROCEDURE — 86141 C-REACTIVE PROTEIN HS: CPT

## 2021-01-01 PROCEDURE — 2500000003 HC RX 250 WO HCPCS: Performed by: HOSPITALIST

## 2021-01-01 PROCEDURE — XW0DXM6 INTRODUCTION OF BARICITINIB INTO MOUTH AND PHARYNX, EXTERNAL APPROACH, NEW TECHNOLOGY GROUP 6: ICD-10-PCS | Performed by: INTERNAL MEDICINE

## 2021-01-01 PROCEDURE — 99221 1ST HOSP IP/OBS SF/LOW 40: CPT | Performed by: INTERNAL MEDICINE

## 2021-01-01 PROCEDURE — 94761 N-INVAS EAR/PLS OXIMETRY MLT: CPT

## 2021-01-01 PROCEDURE — 6360000002 HC RX W HCPCS: Performed by: HOSPITALIST

## 2021-01-01 PROCEDURE — 02HV33Z INSERTION OF INFUSION DEVICE INTO SUPERIOR VENA CAVA, PERCUTANEOUS APPROACH: ICD-10-PCS | Performed by: HOSPITALIST

## 2021-01-01 PROCEDURE — XW033G6 INTRODUCTION OF REGN-COV2 MONOCLONAL ANTIBODY INTO PERIPHERAL VEIN, PERCUTANEOUS APPROACH, NEW TECHNOLOGY GROUP 6: ICD-10-PCS | Performed by: EMERGENCY MEDICINE

## 2021-01-01 PROCEDURE — 83605 ASSAY OF LACTIC ACID: CPT

## 2021-01-01 PROCEDURE — 6360000002 HC RX W HCPCS: Performed by: STUDENT IN AN ORGANIZED HEALTH CARE EDUCATION/TRAINING PROGRAM

## 2021-01-01 PROCEDURE — 85384 FIBRINOGEN ACTIVITY: CPT

## 2021-01-01 PROCEDURE — 87449 NOS EACH ORGANISM AG IA: CPT

## 2021-01-01 PROCEDURE — 6360000002 HC RX W HCPCS

## 2021-01-01 PROCEDURE — 2580000003 HC RX 258

## 2021-01-01 PROCEDURE — 31500 INSERT EMERGENCY AIRWAY: CPT | Performed by: ANESTHESIOLOGY

## 2021-01-01 PROCEDURE — 85379 FIBRIN DEGRADATION QUANT: CPT

## 2021-01-01 PROCEDURE — C1751 CATH, INF, PER/CENT/MIDLINE: HCPCS

## 2021-01-01 PROCEDURE — 74018 RADEX ABDOMEN 1 VIEW: CPT

## 2021-01-01 PROCEDURE — 99291 CRITICAL CARE FIRST HOUR: CPT | Performed by: INTERNAL MEDICINE

## 2021-01-01 PROCEDURE — 2580000003 HC RX 258: Performed by: STUDENT IN AN ORGANIZED HEALTH CARE EDUCATION/TRAINING PROGRAM

## 2021-01-01 PROCEDURE — 80048 BASIC METABOLIC PNL TOTAL CA: CPT

## 2021-01-01 PROCEDURE — 85610 PROTHROMBIN TIME: CPT

## 2021-01-01 PROCEDURE — 87070 CULTURE OTHR SPECIMN AEROBIC: CPT

## 2021-01-01 PROCEDURE — 82728 ASSAY OF FERRITIN: CPT

## 2021-01-01 PROCEDURE — 2580000003 HC RX 258: Performed by: NURSE PRACTITIONER

## 2021-01-01 PROCEDURE — 0BH17EZ INSERTION OF ENDOTRACHEAL AIRWAY INTO TRACHEA, VIA NATURAL OR ARTIFICIAL OPENING: ICD-10-PCS | Performed by: INTERNAL MEDICINE

## 2021-01-01 PROCEDURE — 6370000000 HC RX 637 (ALT 250 FOR IP): Performed by: NURSE PRACTITIONER

## 2021-01-01 PROCEDURE — 82805 BLOOD GASES W/O2 SATURATION: CPT

## 2021-01-01 PROCEDURE — 99223 1ST HOSP IP/OBS HIGH 75: CPT | Performed by: INTERNAL MEDICINE

## 2021-01-01 PROCEDURE — 5A1955Z RESPIRATORY VENTILATION, GREATER THAN 96 CONSECUTIVE HOURS: ICD-10-PCS | Performed by: INTERNAL MEDICINE

## 2021-01-01 PROCEDURE — 31500 INSERT EMERGENCY AIRWAY: CPT

## 2021-01-01 PROCEDURE — 86480 TB TEST CELL IMMUN MEASURE: CPT

## 2021-01-01 PROCEDURE — 81001 URINALYSIS AUTO W/SCOPE: CPT

## 2021-01-01 PROCEDURE — 82962 GLUCOSE BLOOD TEST: CPT

## 2021-01-01 PROCEDURE — 6360000002 HC RX W HCPCS: Performed by: PHYSICIAN ASSISTANT

## 2021-01-01 PROCEDURE — 87086 URINE CULTURE/COLONY COUNT: CPT

## 2021-01-01 PROCEDURE — 84484 ASSAY OF TROPONIN QUANT: CPT

## 2021-01-01 PROCEDURE — 96374 THER/PROPH/DIAG INJ IV PUSH: CPT

## 2021-01-01 PROCEDURE — 87040 BLOOD CULTURE FOR BACTERIA: CPT

## 2021-01-01 PROCEDURE — 99285 EMERGENCY DEPT VISIT HI MDM: CPT

## 2021-01-01 PROCEDURE — 99214 OFFICE O/P EST MOD 30 MIN: CPT | Performed by: INTERNAL MEDICINE

## 2021-01-01 PROCEDURE — 2500000003 HC RX 250 WO HCPCS: Performed by: STUDENT IN AN ORGANIZED HEALTH CARE EDUCATION/TRAINING PROGRAM

## 2021-01-01 PROCEDURE — 80202 ASSAY OF VANCOMYCIN: CPT

## 2021-01-01 PROCEDURE — 99213 OFFICE O/P EST LOW 20 MIN: CPT | Performed by: INTERNAL MEDICINE

## 2021-01-01 PROCEDURE — 99221 1ST HOSP IP/OBS SF/LOW 40: CPT | Performed by: THORACIC SURGERY (CARDIOTHORACIC VASCULAR SURGERY)

## 2021-01-01 PROCEDURE — XW033H5 INTRODUCTION OF TOCILIZUMAB INTO PERIPHERAL VEIN, PERCUTANEOUS APPROACH, NEW TECHNOLOGY GROUP 5: ICD-10-PCS | Performed by: INTERNAL MEDICINE

## 2021-01-01 PROCEDURE — 83880 ASSAY OF NATRIURETIC PEPTIDE: CPT

## 2021-01-01 PROCEDURE — 86140 C-REACTIVE PROTEIN: CPT

## 2021-01-01 PROCEDURE — 82306 VITAMIN D 25 HYDROXY: CPT

## 2021-01-01 PROCEDURE — 87899 AGENT NOS ASSAY W/OPTIC: CPT

## 2021-01-01 PROCEDURE — 36592 COLLECT BLOOD FROM PICC: CPT

## 2021-01-01 PROCEDURE — 80076 HEPATIC FUNCTION PANEL: CPT

## 2021-01-01 PROCEDURE — 36620 INSERTION CATHETER ARTERY: CPT | Performed by: THORACIC SURGERY (CARDIOTHORACIC VASCULAR SURGERY)

## 2021-01-01 PROCEDURE — 71046 X-RAY EXAM CHEST 2 VIEWS: CPT

## 2021-01-01 PROCEDURE — 87635 SARS-COV-2 COVID-19 AMP PRB: CPT

## 2021-01-01 PROCEDURE — 85730 THROMBOPLASTIN TIME PARTIAL: CPT

## 2021-01-01 PROCEDURE — 93306 TTE W/DOPPLER COMPLETE: CPT

## 2021-01-01 PROCEDURE — 93010 ELECTROCARDIOGRAM REPORT: CPT | Performed by: INTERNAL MEDICINE

## 2021-01-01 PROCEDURE — 76937 US GUIDE VASCULAR ACCESS: CPT

## 2021-01-01 PROCEDURE — 87081 CULTURE SCREEN ONLY: CPT

## 2021-01-01 PROCEDURE — 36569 INSJ PICC 5 YR+ W/O IMAGING: CPT

## 2021-01-01 PROCEDURE — 71275 CT ANGIOGRAPHY CHEST: CPT

## 2021-01-01 PROCEDURE — 82248 BILIRUBIN DIRECT: CPT

## 2021-01-01 PROCEDURE — 94002 VENT MGMT INPAT INIT DAY: CPT

## 2021-01-01 PROCEDURE — 6370000000 HC RX 637 (ALT 250 FOR IP): Performed by: PHYSICIAN ASSISTANT

## 2021-01-01 PROCEDURE — 83615 LACTATE (LD) (LDH) ENZYME: CPT

## 2021-01-01 RX ORDER — ROCURONIUM BROMIDE 10 MG/ML
INJECTION, SOLUTION INTRAVENOUS PRN
Status: DISCONTINUED | OUTPATIENT
Start: 2021-01-01 | End: 2021-01-01 | Stop reason: HOSPADM

## 2021-01-01 RX ORDER — SODIUM CHLORIDE 9 MG/ML
INJECTION, SOLUTION INTRAVENOUS CONTINUOUS
Status: DISCONTINUED | OUTPATIENT
Start: 2021-01-01 | End: 2021-01-01

## 2021-01-01 RX ORDER — MIDODRINE HYDROCHLORIDE 5 MG/1
5 TABLET ORAL 3 TIMES DAILY
Status: DISCONTINUED | OUTPATIENT
Start: 2021-01-01 | End: 2021-01-01 | Stop reason: HOSPADM

## 2021-01-01 RX ORDER — ACETAMINOPHEN 325 MG/1
650 TABLET ORAL EVERY 6 HOURS PRN
Status: DISCONTINUED | OUTPATIENT
Start: 2021-01-01 | End: 2021-01-01 | Stop reason: HOSPADM

## 2021-01-01 RX ORDER — CALCIUM CHLORIDE 100 MG/ML
INJECTION INTRAVENOUS; INTRAVENTRICULAR
Status: COMPLETED | OUTPATIENT
Start: 2021-01-01 | End: 2021-01-01

## 2021-01-01 RX ORDER — HYDROXYZINE PAMOATE 25 MG/1
25 CAPSULE ORAL 3 TIMES DAILY PRN
Status: DISCONTINUED | OUTPATIENT
Start: 2021-01-01 | End: 2021-01-01

## 2021-01-01 RX ORDER — GLYCOPYRROLATE 1 MG/5 ML
0.2 SYRINGE (ML) INTRAVENOUS EVERY 4 HOURS PRN
Status: DISCONTINUED | OUTPATIENT
Start: 2021-01-01 | End: 2021-01-01 | Stop reason: HOSPADM

## 2021-01-01 RX ORDER — HYDROXYZINE PAMOATE 25 MG/1
25 CAPSULE ORAL DAILY PRN
Qty: 30 CAPSULE | Refills: 0 | Status: SHIPPED | OUTPATIENT
Start: 2021-01-01 | End: 2021-01-01

## 2021-01-01 RX ORDER — BUSPIRONE HYDROCHLORIDE 5 MG/1
5 TABLET ORAL 2 TIMES DAILY
Qty: 180 TABLET | Refills: 1 | Status: SHIPPED | OUTPATIENT
Start: 2021-01-01

## 2021-01-01 RX ORDER — MIDODRINE HYDROCHLORIDE 5 MG/1
10 TABLET ORAL ONCE
Status: COMPLETED | OUTPATIENT
Start: 2021-01-01 | End: 2021-01-01

## 2021-01-01 RX ORDER — HYDROXYZINE PAMOATE 25 MG/1
25 CAPSULE ORAL DAILY PRN
Qty: 30 CAPSULE | Refills: 1 | Status: SHIPPED | OUTPATIENT
Start: 2021-01-01 | End: 2021-01-01

## 2021-01-01 RX ORDER — ALBUTEROL SULFATE 90 UG/1
2 AEROSOL, METERED RESPIRATORY (INHALATION) 2 TIMES DAILY
Status: DISCONTINUED | OUTPATIENT
Start: 2021-01-01 | End: 2021-01-01

## 2021-01-01 RX ORDER — BUSPIRONE HYDROCHLORIDE 5 MG/1
TABLET ORAL
Qty: 60 TABLET | Refills: 1 | Status: SHIPPED | OUTPATIENT
Start: 2021-01-01 | End: 2021-01-01

## 2021-01-01 RX ORDER — MIDODRINE HYDROCHLORIDE 5 MG/1
10 TABLET ORAL
Status: DISCONTINUED | OUTPATIENT
Start: 2021-01-01 | End: 2021-01-01

## 2021-01-01 RX ORDER — HYDROXYZINE PAMOATE 25 MG/1
25 CAPSULE ORAL DAILY PRN
Qty: 30 CAPSULE | Refills: 1 | Status: SHIPPED | OUTPATIENT
Start: 2021-01-01 | End: 2021-01-01 | Stop reason: SDUPTHER

## 2021-01-01 RX ORDER — NOREPINEPHRINE BIT/0.9 % NACL 16MG/250ML
2-100 INFUSION BOTTLE (ML) INTRAVENOUS CONTINUOUS
Status: DISCONTINUED | OUTPATIENT
Start: 2021-01-01 | End: 2021-01-01

## 2021-01-01 RX ORDER — BUDESONIDE AND FORMOTEROL FUMARATE DIHYDRATE 160; 4.5 UG/1; UG/1
2 AEROSOL RESPIRATORY (INHALATION) 2 TIMES DAILY
Status: DISCONTINUED | OUTPATIENT
Start: 2021-01-01 | End: 2021-01-01

## 2021-01-01 RX ORDER — GUAIFENESIN/DEXTROMETHORPHAN 100-10MG/5
5 SYRUP ORAL EVERY 4 HOURS PRN
Status: DISCONTINUED | OUTPATIENT
Start: 2021-01-01 | End: 2021-01-01 | Stop reason: HOSPADM

## 2021-01-01 RX ORDER — MONTELUKAST SODIUM 10 MG/1
TABLET ORAL
Qty: 90 TABLET | Refills: 1 | Status: SHIPPED | OUTPATIENT
Start: 2021-01-01 | End: 2021-01-01 | Stop reason: SDUPTHER

## 2021-01-01 RX ORDER — DILTIAZEM HYDROCHLORIDE 120 MG/1
CAPSULE, COATED, EXTENDED RELEASE ORAL
Qty: 180 CAPSULE | Refills: 1 | Status: SHIPPED | OUTPATIENT
Start: 2021-01-01 | End: 2021-01-01

## 2021-01-01 RX ORDER — DEXAMETHASONE SODIUM PHOSPHATE 10 MG/ML
6 INJECTION, SOLUTION INTRAMUSCULAR; INTRAVENOUS ONCE
Status: COMPLETED | OUTPATIENT
Start: 2021-01-01 | End: 2021-01-01

## 2021-01-01 RX ORDER — EPINEPHRINE 0.1 MG/ML
SYRINGE (ML) INJECTION
Status: COMPLETED | OUTPATIENT
Start: 2021-01-01 | End: 2021-01-01

## 2021-01-01 RX ORDER — ALBUTEROL SULFATE 90 UG/1
2 AEROSOL, METERED RESPIRATORY (INHALATION) ONCE
Status: COMPLETED | OUTPATIENT
Start: 2021-01-01 | End: 2021-01-01

## 2021-01-01 RX ORDER — DEXAMETHASONE SODIUM PHOSPHATE 10 MG/ML
10 INJECTION, SOLUTION INTRAMUSCULAR; INTRAVENOUS EVERY 24 HOURS
Status: DISCONTINUED | OUTPATIENT
Start: 2021-01-01 | End: 2021-01-01 | Stop reason: SDUPTHER

## 2021-01-01 RX ORDER — ALBUTEROL SULFATE 90 UG/1
4 AEROSOL, METERED RESPIRATORY (INHALATION) EVERY 4 HOURS
Status: DISCONTINUED | OUTPATIENT
Start: 2021-01-01 | End: 2021-01-01 | Stop reason: HOSPADM

## 2021-01-01 RX ORDER — SUCCINYLCHOLINE/SOD CL,ISO/PF 100 MG/5ML
SYRINGE (ML) INTRAVENOUS PRN
Status: DISCONTINUED | OUTPATIENT
Start: 2021-01-01 | End: 2021-01-01 | Stop reason: HOSPADM

## 2021-01-01 RX ORDER — ROCURONIUM BROMIDE 10 MG/ML
1 INJECTION, SOLUTION INTRAVENOUS ONCE
Status: COMPLETED | OUTPATIENT
Start: 2021-01-01 | End: 2021-01-01

## 2021-01-01 RX ORDER — DILTIAZEM HYDROCHLORIDE 120 MG/1
120 CAPSULE, COATED, EXTENDED RELEASE ORAL 2 TIMES DAILY
Status: DISCONTINUED | OUTPATIENT
Start: 2021-01-01 | End: 2021-01-01

## 2021-01-01 RX ORDER — BUDESONIDE AND FORMOTEROL FUMARATE DIHYDRATE 160; 4.5 UG/1; UG/1
AEROSOL RESPIRATORY (INHALATION)
Qty: 10.2 INHALER | Refills: 5 | Status: SHIPPED | OUTPATIENT
Start: 2021-01-01

## 2021-01-01 RX ORDER — POLYETHYLENE GLYCOL 3350 17 G/17G
17 POWDER, FOR SOLUTION ORAL DAILY PRN
Status: DISCONTINUED | OUTPATIENT
Start: 2021-01-01 | End: 2021-01-01 | Stop reason: HOSPADM

## 2021-01-01 RX ORDER — CALCIUM CARBONATE 200(500)MG
500 TABLET,CHEWABLE ORAL DAILY
Status: DISCONTINUED | OUTPATIENT
Start: 2021-01-01 | End: 2021-01-01

## 2021-01-01 RX ORDER — ONDANSETRON 2 MG/ML
4 INJECTION INTRAMUSCULAR; INTRAVENOUS EVERY 6 HOURS PRN
Status: DISCONTINUED | OUTPATIENT
Start: 2021-01-01 | End: 2021-01-01 | Stop reason: HOSPADM

## 2021-01-01 RX ORDER — SODIUM CHLORIDE 0.9 % (FLUSH) 0.9 %
5-40 SYRINGE (ML) INJECTION EVERY 12 HOURS SCHEDULED
Status: DISCONTINUED | OUTPATIENT
Start: 2021-01-01 | End: 2021-01-01 | Stop reason: HOSPADM

## 2021-01-01 RX ORDER — ACETAMINOPHEN 650 MG/1
650 SUPPOSITORY RECTAL EVERY 6 HOURS PRN
Status: DISCONTINUED | OUTPATIENT
Start: 2021-01-01 | End: 2021-01-01 | Stop reason: HOSPADM

## 2021-01-01 RX ORDER — VANCOMYCIN 1 G/200ML
1000 INJECTION, SOLUTION INTRAVENOUS EVERY 12 HOURS
Status: DISCONTINUED | OUTPATIENT
Start: 2021-01-01 | End: 2021-01-01

## 2021-01-01 RX ORDER — BUDESONIDE AND FORMOTEROL FUMARATE DIHYDRATE 80; 4.5 UG/1; UG/1
2 AEROSOL RESPIRATORY (INHALATION) 2 TIMES DAILY
Qty: 1 INHALER | Refills: 5 | Status: SHIPPED | OUTPATIENT
Start: 2021-01-01 | End: 2021-01-01 | Stop reason: SDUPTHER

## 2021-01-01 RX ORDER — PROPOFOL 10 MG/ML
INJECTION, EMULSION INTRAVENOUS PRN
Status: DISCONTINUED | OUTPATIENT
Start: 2021-01-01 | End: 2021-01-01 | Stop reason: ALTCHOICE

## 2021-01-01 RX ORDER — SODIUM CHLORIDE 9 MG/ML
25 INJECTION, SOLUTION INTRAVENOUS PRN
Status: DISCONTINUED | OUTPATIENT
Start: 2021-01-01 | End: 2021-01-01 | Stop reason: HOSPADM

## 2021-01-01 RX ORDER — LORAZEPAM 2 MG/ML
0.5 INJECTION INTRAMUSCULAR ONCE
Status: COMPLETED | OUTPATIENT
Start: 2021-01-01 | End: 2021-01-01

## 2021-01-01 RX ORDER — SODIUM CHLORIDE 9 MG/ML
25 INJECTION, SOLUTION INTRAVENOUS PRN
Status: DISCONTINUED | OUTPATIENT
Start: 2021-01-01 | End: 2021-01-01 | Stop reason: SDUPTHER

## 2021-01-01 RX ORDER — ONDANSETRON 4 MG/1
4 TABLET, ORALLY DISINTEGRATING ORAL EVERY 8 HOURS PRN
Status: DISCONTINUED | OUTPATIENT
Start: 2021-01-01 | End: 2021-01-01 | Stop reason: HOSPADM

## 2021-01-01 RX ORDER — SODIUM CHLORIDE 0.9 % (FLUSH) 0.9 %
5-40 SYRINGE (ML) INJECTION PRN
Status: DISCONTINUED | OUTPATIENT
Start: 2021-01-01 | End: 2021-01-01 | Stop reason: HOSPADM

## 2021-01-01 RX ORDER — DILTIAZEM HYDROCHLORIDE 5 MG/ML
10 INJECTION INTRAVENOUS ONCE
Status: COMPLETED | OUTPATIENT
Start: 2021-01-01 | End: 2021-01-01

## 2021-01-01 RX ORDER — CHLORHEXIDINE GLUCONATE 0.12 MG/ML
15 RINSE ORAL 2 TIMES DAILY
Status: DISCONTINUED | OUTPATIENT
Start: 2021-01-01 | End: 2021-01-01 | Stop reason: HOSPADM

## 2021-01-01 RX ORDER — TRAZODONE HYDROCHLORIDE 50 MG/1
50 TABLET ORAL NIGHTLY PRN
Status: DISCONTINUED | OUTPATIENT
Start: 2021-01-01 | End: 2021-01-01 | Stop reason: HOSPADM

## 2021-01-01 RX ORDER — BUSPIRONE HYDROCHLORIDE 5 MG/1
TABLET ORAL
Qty: 60 TABLET | Refills: 2 | Status: SHIPPED | OUTPATIENT
Start: 2021-01-01 | End: 2021-01-01 | Stop reason: SDUPTHER

## 2021-01-01 RX ORDER — METOPROLOL TARTRATE 5 MG/5ML
5 INJECTION INTRAVENOUS EVERY 6 HOURS PRN
Status: DISCONTINUED | OUTPATIENT
Start: 2021-01-01 | End: 2021-01-01

## 2021-01-01 RX ORDER — SODIUM CHLORIDE 0.9 % (FLUSH) 0.9 %
5-40 SYRINGE (ML) INJECTION PRN
Status: DISCONTINUED | OUTPATIENT
Start: 2021-01-01 | End: 2021-01-01 | Stop reason: SDUPTHER

## 2021-01-01 RX ORDER — DEXTROMETHORPHAN HYDROBROMIDE AND PROMETHAZINE HYDROCHLORIDE 15; 6.25 MG/5ML; MG/5ML
5 SYRUP ORAL EVERY 4 HOURS PRN
COMMUNITY
Start: 2021-01-01

## 2021-01-01 RX ORDER — METOPROLOL TARTRATE 5 MG/5ML
5 INJECTION INTRAVENOUS ONCE
Status: COMPLETED | OUTPATIENT
Start: 2021-01-01 | End: 2021-01-01

## 2021-01-01 RX ORDER — BUSPIRONE HYDROCHLORIDE 5 MG/1
TABLET ORAL
Qty: 60 TABLET | Refills: 0 | Status: SHIPPED | OUTPATIENT
Start: 2021-01-01 | End: 2021-01-01

## 2021-01-01 RX ORDER — MONTELUKAST SODIUM 10 MG/1
10 TABLET ORAL NIGHTLY
Status: DISCONTINUED | OUTPATIENT
Start: 2021-01-01 | End: 2021-01-01 | Stop reason: HOSPADM

## 2021-01-01 RX ORDER — SODIUM CHLORIDE 0.9 % (FLUSH) 0.9 %
5-40 SYRINGE (ML) INJECTION EVERY 12 HOURS SCHEDULED
Status: DISCONTINUED | OUTPATIENT
Start: 2021-01-01 | End: 2021-01-01 | Stop reason: SDUPTHER

## 2021-01-01 RX ORDER — LIDOCAINE HYDROCHLORIDE 20 MG/ML
JELLY TOPICAL ONCE
Status: DISCONTINUED | OUTPATIENT
Start: 2021-01-01 | End: 2021-01-01 | Stop reason: HOSPADM

## 2021-01-01 RX ORDER — BUSPIRONE HYDROCHLORIDE 5 MG/1
5 TABLET ORAL 2 TIMES DAILY
Status: DISCONTINUED | OUTPATIENT
Start: 2021-01-01 | End: 2021-01-01 | Stop reason: HOSPADM

## 2021-01-01 RX ORDER — HYDROMORPHONE HCL 110MG/55ML
0.5 PATIENT CONTROLLED ANALGESIA SYRINGE INTRAVENOUS
Status: DISCONTINUED | OUTPATIENT
Start: 2021-01-01 | End: 2021-01-01 | Stop reason: HOSPADM

## 2021-01-01 RX ORDER — MIDODRINE HYDROCHLORIDE 5 MG/1
5 TABLET ORAL
Status: DISCONTINUED | OUTPATIENT
Start: 2021-01-01 | End: 2021-01-01

## 2021-01-01 RX ORDER — DEXAMETHASONE SODIUM PHOSPHATE 10 MG/ML
10 INJECTION, SOLUTION INTRAMUSCULAR; INTRAVENOUS EVERY 24 HOURS
Status: DISCONTINUED | OUTPATIENT
Start: 2021-01-01 | End: 2021-01-01

## 2021-01-01 RX ORDER — LORAZEPAM 2 MG/ML
0.5 INJECTION INTRAMUSCULAR
Status: DISCONTINUED | OUTPATIENT
Start: 2021-01-01 | End: 2021-01-01 | Stop reason: HOSPADM

## 2021-01-01 RX ORDER — HYDROXYZINE PAMOATE 25 MG/1
25 CAPSULE ORAL DAILY PRN
Qty: 30 CAPSULE | Refills: 1 | Status: SHIPPED | OUTPATIENT
Start: 2021-01-01

## 2021-01-01 RX ORDER — ALBUTEROL SULFATE 90 UG/1
4 AEROSOL, METERED RESPIRATORY (INHALATION) EVERY 4 HOURS
Status: DISCONTINUED | OUTPATIENT
Start: 2021-01-01 | End: 2021-01-01

## 2021-01-01 RX ORDER — DEXTROMETHORPHAN HYDROBROMIDE AND PROMETHAZINE HYDROCHLORIDE 15; 6.25 MG/5ML; MG/5ML
5 SYRUP ORAL 4 TIMES DAILY PRN
Qty: 180 ML | Refills: 0 | Status: SHIPPED | OUTPATIENT
Start: 2021-01-01 | End: 2021-01-01

## 2021-01-01 RX ORDER — ALBUTEROL SULFATE 90 UG/1
AEROSOL, METERED RESPIRATORY (INHALATION)
Qty: 8.5 INHALER | Refills: 5 | Status: SHIPPED | OUTPATIENT
Start: 2021-01-01

## 2021-01-01 RX ORDER — SODIUM CHLORIDE, SODIUM LACTATE, POTASSIUM CHLORIDE, CALCIUM CHLORIDE 600; 310; 30; 20 MG/100ML; MG/100ML; MG/100ML; MG/100ML
INJECTION, SOLUTION INTRAVENOUS CONTINUOUS
Status: DISCONTINUED | OUTPATIENT
Start: 2021-01-01 | End: 2021-01-01

## 2021-01-01 RX ORDER — BENZONATATE 100 MG/1
100 CAPSULE ORAL 3 TIMES DAILY PRN
Status: DISCONTINUED | OUTPATIENT
Start: 2021-01-01 | End: 2021-01-01

## 2021-01-01 RX ORDER — MONTELUKAST SODIUM 10 MG/1
TABLET ORAL
Qty: 90 TABLET | Refills: 1 | Status: SHIPPED | OUTPATIENT
Start: 2021-01-01

## 2021-01-01 RX ORDER — DILTIAZEM HYDROCHLORIDE 120 MG/1
CAPSULE, COATED, EXTENDED RELEASE ORAL
Qty: 180 CAPSULE | Refills: 1 | Status: SHIPPED | OUTPATIENT
Start: 2021-01-01 | End: 2021-01-01 | Stop reason: SDUPTHER

## 2021-01-01 RX ORDER — METOPROLOL TARTRATE 5 MG/5ML
2.5 INJECTION INTRAVENOUS EVERY 6 HOURS PRN
Status: DISCONTINUED | OUTPATIENT
Start: 2021-01-01 | End: 2021-01-01

## 2021-01-01 RX ORDER — PROPOFOL 10 MG/ML
5-80 INJECTION, EMULSION INTRAVENOUS CONTINUOUS
Status: DISCONTINUED | OUTPATIENT
Start: 2021-01-01 | End: 2021-01-01 | Stop reason: HOSPADM

## 2021-01-01 RX ORDER — PREDNISONE 20 MG/1
40 TABLET ORAL DAILY
COMMUNITY
Start: 2021-01-01 | End: 2021-01-01

## 2021-01-01 RX ORDER — SODIUM CHLORIDE 9 MG/ML
INJECTION, SOLUTION INTRAVENOUS CONTINUOUS PRN
Status: COMPLETED | OUTPATIENT
Start: 2021-01-01 | End: 2021-01-01

## 2021-01-01 RX ORDER — TRAZODONE HYDROCHLORIDE 50 MG/1
50 TABLET ORAL NIGHTLY
Qty: 90 TABLET | Refills: 1 | Status: SHIPPED | OUTPATIENT
Start: 2021-01-01

## 2021-01-01 RX ORDER — OXYMETAZOLINE HYDROCHLORIDE 0.05 G/100ML
2 SPRAY NASAL ONCE
Status: DISPENSED | OUTPATIENT
Start: 2021-01-01 | End: 2021-01-01

## 2021-01-01 RX ORDER — SODIUM CHLORIDE 9 MG/ML
INJECTION, SOLUTION INTRAVENOUS CONTINUOUS
Status: ACTIVE | OUTPATIENT
Start: 2021-01-01 | End: 2021-01-01

## 2021-01-01 RX ORDER — NOREPINEPHRINE BIT/0.9 % NACL 16MG/250ML
2-100 INFUSION BOTTLE (ML) INTRAVENOUS CONTINUOUS
Status: DISCONTINUED | OUTPATIENT
Start: 2021-01-01 | End: 2021-01-01 | Stop reason: HOSPADM

## 2021-01-01 RX ORDER — DILTIAZEM HYDROCHLORIDE 5 MG/ML
INJECTION INTRAVENOUS
Status: COMPLETED
Start: 2021-01-01 | End: 2021-01-01

## 2021-01-01 RX ORDER — FUROSEMIDE 10 MG/ML
20 INJECTION INTRAMUSCULAR; INTRAVENOUS ONCE
Status: DISCONTINUED | OUTPATIENT
Start: 2021-01-01 | End: 2021-01-01 | Stop reason: HOSPADM

## 2021-01-01 RX ORDER — DILTIAZEM HYDROCHLORIDE 120 MG/1
120 CAPSULE, COATED, EXTENDED RELEASE ORAL 2 TIMES DAILY
Qty: 180 CAPSULE | Refills: 1 | Status: SHIPPED | OUTPATIENT
Start: 2021-01-01

## 2021-01-01 RX ORDER — ALBUTEROL SULFATE 90 UG/1
2 AEROSOL, METERED RESPIRATORY (INHALATION) EVERY 4 HOURS PRN
Status: DISCONTINUED | OUTPATIENT
Start: 2021-01-01 | End: 2021-01-01

## 2021-01-01 RX ORDER — ALBUTEROL SULFATE 90 UG/1
2 AEROSOL, METERED RESPIRATORY (INHALATION) 4 TIMES DAILY
Status: DISCONTINUED | OUTPATIENT
Start: 2021-01-01 | End: 2021-01-01

## 2021-01-01 RX ORDER — DEXAMETHASONE SODIUM PHOSPHATE 10 MG/ML
10 INJECTION INTRAMUSCULAR; INTRAVENOUS EVERY 24 HOURS
Status: COMPLETED | OUTPATIENT
Start: 2021-01-01 | End: 2021-01-01

## 2021-01-01 RX ADMIN — MIDODRINE HYDROCHLORIDE 5 MG: 5 TABLET ORAL at 13:44

## 2021-01-01 RX ADMIN — ALBUTEROL SULFATE 2 PUFF: 90 AEROSOL, METERED RESPIRATORY (INHALATION) at 08:45

## 2021-01-01 RX ADMIN — Medication 175 MCG/HR: at 08:41

## 2021-01-01 RX ADMIN — CEFEPIME HYDROCHLORIDE 2000 MG: 2 INJECTION, POWDER, FOR SOLUTION INTRAVENOUS at 12:58

## 2021-01-01 RX ADMIN — DEXMEDETOMIDINE 0.2 MCG/KG/HR: 100 INJECTION, SOLUTION, CONCENTRATE INTRAVENOUS at 11:06

## 2021-01-01 RX ADMIN — ACETAMINOPHEN 650 MG: 325 TABLET ORAL at 08:28

## 2021-01-01 RX ADMIN — DILTIAZEM HYDROCHLORIDE 10 MG: 5 INJECTION INTRAVENOUS at 18:32

## 2021-01-01 RX ADMIN — MONTELUKAST 10 MG: 10 TABLET, FILM COATED ORAL at 20:32

## 2021-01-01 RX ADMIN — FAMOTIDINE 20 MG: 10 INJECTION, SOLUTION INTRAVENOUS at 20:02

## 2021-01-01 RX ADMIN — CHLORHEXIDINE GLUCONATE 0.12% ORAL RINSE 15 ML: 1.2 LIQUID ORAL at 21:11

## 2021-01-01 RX ADMIN — ENOXAPARIN SODIUM 70 MG: 100 INJECTION SUBCUTANEOUS at 09:27

## 2021-01-01 RX ADMIN — Medication 2000 UNITS: at 08:35

## 2021-01-01 RX ADMIN — MIDODRINE HYDROCHLORIDE 5 MG: 5 TABLET ORAL at 21:41

## 2021-01-01 RX ADMIN — VECURONIUM BROMIDE 1.4 MCG/KG/MIN: 1 INJECTION, POWDER, LYOPHILIZED, FOR SOLUTION INTRAVENOUS at 21:54

## 2021-01-01 RX ADMIN — Medication 2000 UNITS: at 07:32

## 2021-01-01 RX ADMIN — EPINEPHRINE 1 MG: 0.1 INJECTION, SOLUTION ENDOTRACHEAL; INTRACARDIAC; INTRAVENOUS at 02:35

## 2021-01-01 RX ADMIN — FAMOTIDINE 20 MG: 10 INJECTION, SOLUTION INTRAVENOUS at 20:44

## 2021-01-01 RX ADMIN — VANCOMYCIN HYDROCHLORIDE 1000 MG: 1 INJECTION, POWDER, LYOPHILIZED, FOR SOLUTION INTRAVENOUS at 08:54

## 2021-01-01 RX ADMIN — PROPOFOL 50 MCG/KG/MIN: 10 INJECTION, EMULSION INTRAVENOUS at 00:50

## 2021-01-01 RX ADMIN — ACETAMINOPHEN 650 MG: 325 TABLET ORAL at 22:45

## 2021-01-01 RX ADMIN — DEXTROSE MONOHYDRATE 10 MG/HR: 50 INJECTION, SOLUTION INTRAVENOUS at 07:31

## 2021-01-01 RX ADMIN — MIDODRINE HYDROCHLORIDE 10 MG: 5 TABLET ORAL at 16:41

## 2021-01-01 RX ADMIN — PROPOFOL 65 MCG/KG/MIN: 10 INJECTION, EMULSION INTRAVENOUS at 09:29

## 2021-01-01 RX ADMIN — ACETAMINOPHEN 650 MG: 650 SUPPOSITORY RECTAL at 15:46

## 2021-01-01 RX ADMIN — DEXAMETHASONE SODIUM PHOSPHATE 10 MG: 10 INJECTION, SOLUTION INTRAMUSCULAR; INTRAVENOUS at 17:44

## 2021-01-01 RX ADMIN — Medication 2 PUFF: at 19:46

## 2021-01-01 RX ADMIN — CEFEPIME HYDROCHLORIDE 2000 MG: 2 INJECTION, POWDER, FOR SOLUTION INTRAVENOUS at 15:09

## 2021-01-01 RX ADMIN — TRAZODONE HYDROCHLORIDE 50 MG: 50 TABLET ORAL at 20:36

## 2021-01-01 RX ADMIN — PROPOFOL 70 MCG/KG/MIN: 10 INJECTION, EMULSION INTRAVENOUS at 08:22

## 2021-01-01 RX ADMIN — SODIUM CHLORIDE, PRESERVATIVE FREE 10 ML: 5 INJECTION INTRAVENOUS at 21:13

## 2021-01-01 RX ADMIN — ALBUTEROL SULFATE 4 PUFF: 90 AEROSOL, METERED RESPIRATORY (INHALATION) at 07:30

## 2021-01-01 RX ADMIN — ENOXAPARIN SODIUM 70 MG: 100 INJECTION SUBCUTANEOUS at 09:17

## 2021-01-01 RX ADMIN — FAMOTIDINE 20 MG: 10 INJECTION, SOLUTION INTRAVENOUS at 21:18

## 2021-01-01 RX ADMIN — CEFEPIME HYDROCHLORIDE 2000 MG: 2 INJECTION, POWDER, FOR SOLUTION INTRAVENOUS at 05:31

## 2021-01-01 RX ADMIN — Medication 175 MCG/HR: at 00:27

## 2021-01-01 RX ADMIN — PROPOFOL 70 MCG/KG/MIN: 10 INJECTION, EMULSION INTRAVENOUS at 04:22

## 2021-01-01 RX ADMIN — HYDROXYZINE PAMOATE 25 MG: 25 CAPSULE ORAL at 05:19

## 2021-01-01 RX ADMIN — MONTELUKAST 10 MG: 10 TABLET, FILM COATED ORAL at 21:42

## 2021-01-01 RX ADMIN — ALBUTEROL SULFATE 4 PUFF: 90 AEROSOL, METERED RESPIRATORY (INHALATION) at 11:47

## 2021-01-01 RX ADMIN — BUSPIRONE HYDROCHLORIDE 5 MG: 5 TABLET ORAL at 07:32

## 2021-01-01 RX ADMIN — Medication 200 MCG/HR: at 18:33

## 2021-01-01 RX ADMIN — BUSPIRONE HYDROCHLORIDE 5 MG: 5 TABLET ORAL at 20:33

## 2021-01-01 RX ADMIN — PROPOFOL 45 MCG/KG/MIN: 10 INJECTION, EMULSION INTRAVENOUS at 12:59

## 2021-01-01 RX ADMIN — ENOXAPARIN SODIUM 30 MG: 100 INJECTION SUBCUTANEOUS at 08:43

## 2021-01-01 RX ADMIN — BUSPIRONE HYDROCHLORIDE 5 MG: 5 TABLET ORAL at 20:42

## 2021-01-01 RX ADMIN — DEXAMETHASONE SODIUM PHOSPHATE 10 MG: 10 INJECTION, SOLUTION INTRAMUSCULAR; INTRAVENOUS at 09:06

## 2021-01-01 RX ADMIN — LEVOTHYROXINE SODIUM 125 MCG: 0.03 TABLET ORAL at 08:23

## 2021-01-01 RX ADMIN — FAMOTIDINE 20 MG: 10 INJECTION, SOLUTION INTRAVENOUS at 07:59

## 2021-01-01 RX ADMIN — MIDODRINE HYDROCHLORIDE 5 MG: 5 TABLET ORAL at 11:52

## 2021-01-01 RX ADMIN — Medication 2000 UNITS: at 09:48

## 2021-01-01 RX ADMIN — CEFEPIME HYDROCHLORIDE 2000 MG: 2 INJECTION, POWDER, FOR SOLUTION INTRAVENOUS at 15:45

## 2021-01-01 RX ADMIN — BUSPIRONE HYDROCHLORIDE 5 MG: 5 TABLET ORAL at 08:32

## 2021-01-01 RX ADMIN — MIDAZOLAM HYDROCHLORIDE 7 MG/HR: 5 INJECTION INTRAMUSCULAR; INTRAVENOUS at 12:56

## 2021-01-01 RX ADMIN — Medication 2000 UNITS: at 09:08

## 2021-01-01 RX ADMIN — Medication 2000 UNITS: at 08:31

## 2021-01-01 RX ADMIN — CEFEPIME HYDROCHLORIDE 2000 MG: 2 INJECTION, POWDER, FOR SOLUTION INTRAVENOUS at 00:03

## 2021-01-01 RX ADMIN — ACETAMINOPHEN 650 MG: 325 TABLET ORAL at 15:49

## 2021-01-01 RX ADMIN — SODIUM CHLORIDE, PRESERVATIVE FREE 10 ML: 5 INJECTION INTRAVENOUS at 09:18

## 2021-01-01 RX ADMIN — Medication 2 PUFF: at 08:45

## 2021-01-01 RX ADMIN — SODIUM CHLORIDE, PRESERVATIVE FREE 10 ML: 5 INJECTION INTRAVENOUS at 08:04

## 2021-01-01 RX ADMIN — FAMOTIDINE 20 MG: 10 INJECTION, SOLUTION INTRAVENOUS at 21:14

## 2021-01-01 RX ADMIN — BUDESONIDE AND FORMOTEROL FUMARATE DIHYDRATE 2 PUFF: 160; 4.5 AEROSOL RESPIRATORY (INHALATION) at 22:31

## 2021-01-01 RX ADMIN — SODIUM CHLORIDE, PRESERVATIVE FREE 10 ML: 5 INJECTION INTRAVENOUS at 21:40

## 2021-01-01 RX ADMIN — EPINEPHRINE 30 MCG/MIN: 1 INJECTION INTRAMUSCULAR; INTRAVENOUS; SUBCUTANEOUS at 08:17

## 2021-01-01 RX ADMIN — ALBUTEROL SULFATE 4 PUFF: 90 AEROSOL, METERED RESPIRATORY (INHALATION) at 22:25

## 2021-01-01 RX ADMIN — ALBUTEROL SULFATE 4 PUFF: 90 AEROSOL, METERED RESPIRATORY (INHALATION) at 11:12

## 2021-01-01 RX ADMIN — ACETAMINOPHEN 650 MG: 325 TABLET ORAL at 02:31

## 2021-01-01 RX ADMIN — MIDAZOLAM HYDROCHLORIDE 7 MG/HR: 5 INJECTION INTRAMUSCULAR; INTRAVENOUS at 00:45

## 2021-01-01 RX ADMIN — ROCURONIUM BROMIDE 70 MG: 10 SOLUTION INTRAVENOUS at 15:50

## 2021-01-01 RX ADMIN — Medication 2 PUFF: at 20:41

## 2021-01-01 RX ADMIN — DEXTROSE MONOHYDRATE 5 MG/HR: 50 INJECTION, SOLUTION INTRAVENOUS at 13:21

## 2021-01-01 RX ADMIN — MONTELUKAST 10 MG: 10 TABLET, FILM COATED ORAL at 21:33

## 2021-01-01 RX ADMIN — BENZONATATE 100 MG: 100 CAPSULE ORAL at 21:56

## 2021-01-01 RX ADMIN — GUAIFENESIN AND DEXTROMETHORPHAN 5 ML: 100; 10 SYRUP ORAL at 22:31

## 2021-01-01 RX ADMIN — FAMOTIDINE 20 MG: 10 INJECTION, SOLUTION INTRAVENOUS at 08:23

## 2021-01-01 RX ADMIN — CEFEPIME HYDROCHLORIDE 2000 MG: 2 INJECTION, POWDER, FOR SOLUTION INTRAVENOUS at 06:13

## 2021-01-01 RX ADMIN — DILTIAZEM HYDROCHLORIDE 10 MG: 5 INJECTION INTRAVENOUS at 15:23

## 2021-01-01 RX ADMIN — Medication 125 MCG/HR: at 18:09

## 2021-01-01 RX ADMIN — ALBUTEROL SULFATE 4 PUFF: 90 AEROSOL, METERED RESPIRATORY (INHALATION) at 04:10

## 2021-01-01 RX ADMIN — CEFEPIME HYDROCHLORIDE 2000 MG: 2 INJECTION, POWDER, FOR SOLUTION INTRAVENOUS at 00:22

## 2021-01-01 RX ADMIN — CHLORHEXIDINE GLUCONATE 0.12% ORAL RINSE 15 ML: 1.2 LIQUID ORAL at 21:22

## 2021-01-01 RX ADMIN — DEXAMETHASONE SODIUM PHOSPHATE 10 MG: 10 INJECTION, SOLUTION INTRAMUSCULAR; INTRAVENOUS at 17:12

## 2021-01-01 RX ADMIN — PROPOFOL 70 MCG/KG/MIN: 10 INJECTION, EMULSION INTRAVENOUS at 20:48

## 2021-01-01 RX ADMIN — Medication 50 MEQ: at 02:40

## 2021-01-01 RX ADMIN — SODIUM CHLORIDE, PRESERVATIVE FREE 10 ML: 5 INJECTION INTRAVENOUS at 08:35

## 2021-01-01 RX ADMIN — Medication 150 MCG/HR: at 05:54

## 2021-01-01 RX ADMIN — ALBUTEROL SULFATE 4 PUFF: 90 AEROSOL, METERED RESPIRATORY (INHALATION) at 19:30

## 2021-01-01 RX ADMIN — ALBUTEROL SULFATE 4 PUFF: 90 AEROSOL, METERED RESPIRATORY (INHALATION) at 19:52

## 2021-01-01 RX ADMIN — ALBUTEROL SULFATE 4 PUFF: 90 AEROSOL, METERED RESPIRATORY (INHALATION) at 04:43

## 2021-01-01 RX ADMIN — BUSPIRONE HYDROCHLORIDE 5 MG: 5 TABLET ORAL at 21:18

## 2021-01-01 RX ADMIN — Medication 80 MCG/MIN: at 02:09

## 2021-01-01 RX ADMIN — MIDODRINE HYDROCHLORIDE 5 MG: 5 TABLET ORAL at 08:32

## 2021-01-01 RX ADMIN — HYDROXYZINE PAMOATE 25 MG: 25 CAPSULE ORAL at 17:51

## 2021-01-01 RX ADMIN — CHLORHEXIDINE GLUCONATE 0.12% ORAL RINSE 15 ML: 1.2 LIQUID ORAL at 08:15

## 2021-01-01 RX ADMIN — SODIUM CHLORIDE, POTASSIUM CHLORIDE, SODIUM LACTATE AND CALCIUM CHLORIDE: 600; 310; 30; 20 INJECTION, SOLUTION INTRAVENOUS at 14:12

## 2021-01-01 RX ADMIN — ALBUTEROL SULFATE 4 PUFF: 90 AEROSOL, METERED RESPIRATORY (INHALATION) at 20:08

## 2021-01-01 RX ADMIN — CALCIUM CHLORIDE 1000 MG: 100 INJECTION, SOLUTION INTRAVENOUS; INTRAVENTRICULAR at 02:32

## 2021-01-01 RX ADMIN — FAMOTIDINE 20 MG: 10 INJECTION, SOLUTION INTRAVENOUS at 08:15

## 2021-01-01 RX ADMIN — LIDOCAINE HYDROCHLORIDE 5 ML: 10 INJECTION, SOLUTION INFILTRATION; PERINEURAL at 18:20

## 2021-01-01 RX ADMIN — MONTELUKAST 10 MG: 10 TABLET, FILM COATED ORAL at 20:33

## 2021-01-01 RX ADMIN — MIDODRINE HYDROCHLORIDE 5 MG: 5 TABLET ORAL at 08:28

## 2021-01-01 RX ADMIN — ALBUTEROL SULFATE 4 PUFF: 90 AEROSOL, METERED RESPIRATORY (INHALATION) at 20:30

## 2021-01-01 RX ADMIN — CEFEPIME HYDROCHLORIDE 2000 MG: 2 INJECTION, POWDER, FOR SOLUTION INTRAVENOUS at 21:33

## 2021-01-01 RX ADMIN — FAMOTIDINE 20 MG: 10 INJECTION, SOLUTION INTRAVENOUS at 23:27

## 2021-01-01 RX ADMIN — SODIUM CHLORIDE, PRESERVATIVE FREE 10 ML: 5 INJECTION INTRAVENOUS at 20:31

## 2021-01-01 RX ADMIN — SODIUM CHLORIDE: 9 INJECTION, SOLUTION INTRAVENOUS at 10:45

## 2021-01-01 RX ADMIN — PROPOFOL 70 MCG/KG/MIN: 10 INJECTION, EMULSION INTRAVENOUS at 23:23

## 2021-01-01 RX ADMIN — HYDROXYZINE PAMOATE 25 MG: 25 CAPSULE ORAL at 18:08

## 2021-01-01 RX ADMIN — DEXAMETHASONE SODIUM PHOSPHATE 10 MG: 10 INJECTION, SOLUTION INTRAMUSCULAR; INTRAVENOUS at 16:42

## 2021-01-01 RX ADMIN — VANCOMYCIN HYDROCHLORIDE 1000 MG: 1 INJECTION, POWDER, LYOPHILIZED, FOR SOLUTION INTRAVENOUS at 21:55

## 2021-01-01 RX ADMIN — TRAZODONE HYDROCHLORIDE 50 MG: 50 TABLET ORAL at 22:35

## 2021-01-01 RX ADMIN — CEFEPIME HYDROCHLORIDE 2000 MG: 2 INJECTION, POWDER, FOR SOLUTION INTRAVENOUS at 07:04

## 2021-01-01 RX ADMIN — PHENYLEPHRINE HYDROCHLORIDE 30 MCG/MIN: 10 INJECTION INTRAVENOUS at 04:33

## 2021-01-01 RX ADMIN — ACETAMINOPHEN 650 MG: 325 TABLET ORAL at 22:34

## 2021-01-01 RX ADMIN — DEXAMETHASONE SODIUM PHOSPHATE 10 MG: 10 INJECTION, SOLUTION INTRAMUSCULAR; INTRAVENOUS at 17:16

## 2021-01-01 RX ADMIN — PROPOFOL 70 MCG/KG/MIN: 10 INJECTION, EMULSION INTRAVENOUS at 07:01

## 2021-01-01 RX ADMIN — ALBUTEROL SULFATE 4 PUFF: 90 AEROSOL, METERED RESPIRATORY (INHALATION) at 19:17

## 2021-01-01 RX ADMIN — FAMOTIDINE 20 MG: 10 INJECTION, SOLUTION INTRAVENOUS at 10:34

## 2021-01-01 RX ADMIN — VANCOMYCIN HYDROCHLORIDE 1250 MG: 5 INJECTION, POWDER, LYOPHILIZED, FOR SOLUTION INTRAVENOUS at 09:03

## 2021-01-01 RX ADMIN — GUAIFENESIN AND DEXTROMETHORPHAN 5 ML: 100; 10 SYRUP ORAL at 17:59

## 2021-01-01 RX ADMIN — BENZONATATE 100 MG: 100 CAPSULE ORAL at 05:55

## 2021-01-01 RX ADMIN — BUSPIRONE HYDROCHLORIDE 5 MG: 5 TABLET ORAL at 09:16

## 2021-01-01 RX ADMIN — CALCIUM CARBONATE 500 MG: 500 TABLET, CHEWABLE ORAL at 08:32

## 2021-01-01 RX ADMIN — CHLORHEXIDINE GLUCONATE 0.12% ORAL RINSE 15 ML: 1.2 LIQUID ORAL at 23:38

## 2021-01-01 RX ADMIN — MIDODRINE HYDROCHLORIDE 5 MG: 5 TABLET ORAL at 08:23

## 2021-01-01 RX ADMIN — VANCOMYCIN HYDROCHLORIDE 1250 MG: 5 INJECTION, POWDER, LYOPHILIZED, FOR SOLUTION INTRAVENOUS at 08:06

## 2021-01-01 RX ADMIN — PROPOFOL 80 MCG/KG/MIN: 10 INJECTION, EMULSION INTRAVENOUS at 16:42

## 2021-01-01 RX ADMIN — Medication 2000 UNITS: at 07:59

## 2021-01-01 RX ADMIN — FAMOTIDINE 20 MG: 10 INJECTION, SOLUTION INTRAVENOUS at 21:33

## 2021-01-01 RX ADMIN — LEVOTHYROXINE SODIUM 125 MCG: 0.03 TABLET ORAL at 08:32

## 2021-01-01 RX ADMIN — PROPOFOL 65 MCG/KG/MIN: 10 INJECTION, EMULSION INTRAVENOUS at 17:58

## 2021-01-01 RX ADMIN — PROPOFOL 70 MCG/KG/MIN: 10 INJECTION, EMULSION INTRAVENOUS at 11:45

## 2021-01-01 RX ADMIN — ALBUTEROL SULFATE 4 PUFF: 90 AEROSOL, METERED RESPIRATORY (INHALATION) at 07:21

## 2021-01-01 RX ADMIN — ALBUTEROL SULFATE 4 PUFF: 90 AEROSOL, METERED RESPIRATORY (INHALATION) at 03:24

## 2021-01-01 RX ADMIN — VANCOMYCIN HYDROCHLORIDE 1000 MG: 1 INJECTION, POWDER, LYOPHILIZED, FOR SOLUTION INTRAVENOUS at 07:31

## 2021-01-01 RX ADMIN — PROPOFOL 65 MCG/KG/MIN: 10 INJECTION, EMULSION INTRAVENOUS at 05:16

## 2021-01-01 RX ADMIN — ENOXAPARIN SODIUM 30 MG: 100 INJECTION SUBCUTANEOUS at 09:55

## 2021-01-01 RX ADMIN — CALCIUM CARBONATE 500 MG: 500 TABLET, CHEWABLE ORAL at 08:45

## 2021-01-01 RX ADMIN — CHLORHEXIDINE GLUCONATE 0.12% ORAL RINSE 15 ML: 1.2 LIQUID ORAL at 21:39

## 2021-01-01 RX ADMIN — PROPOFOL 70 MCG/KG/MIN: 10 INJECTION, EMULSION INTRAVENOUS at 21:33

## 2021-01-01 RX ADMIN — Medication 2 PUFF: at 07:26

## 2021-01-01 RX ADMIN — CHLORHEXIDINE GLUCONATE 0.12% ORAL RINSE 15 ML: 1.2 LIQUID ORAL at 21:18

## 2021-01-01 RX ADMIN — HYDROXYZINE PAMOATE 25 MG: 25 CAPSULE ORAL at 17:57

## 2021-01-01 RX ADMIN — MONTELUKAST 10 MG: 10 TABLET, FILM COATED ORAL at 21:22

## 2021-01-01 RX ADMIN — ACETAMINOPHEN 650 MG: 325 TABLET ORAL at 14:17

## 2021-01-01 RX ADMIN — TRAZODONE HYDROCHLORIDE 50 MG: 50 TABLET ORAL at 20:34

## 2021-01-01 RX ADMIN — BUSPIRONE HYDROCHLORIDE 5 MG: 5 TABLET ORAL at 08:28

## 2021-01-01 RX ADMIN — SODIUM CHLORIDE: 9 INJECTION, SOLUTION INTRAVENOUS at 20:30

## 2021-01-01 RX ADMIN — SALINE NASAL SPRAY 1 SPRAY: 1.5 SOLUTION NASAL at 18:04

## 2021-01-01 RX ADMIN — CEFEPIME HYDROCHLORIDE 2000 MG: 2 INJECTION, POWDER, FOR SOLUTION INTRAVENOUS at 14:08

## 2021-01-01 RX ADMIN — CEFEPIME HYDROCHLORIDE 2000 MG: 2 INJECTION, POWDER, FOR SOLUTION INTRAVENOUS at 13:13

## 2021-01-01 RX ADMIN — PROPOFOL 70 MCG/KG/MIN: 10 INJECTION, EMULSION INTRAVENOUS at 15:40

## 2021-01-01 RX ADMIN — ENOXAPARIN SODIUM 30 MG: 100 INJECTION SUBCUTANEOUS at 21:21

## 2021-01-01 RX ADMIN — Medication 2000 UNITS: at 09:17

## 2021-01-01 RX ADMIN — HYDROXYZINE PAMOATE 25 MG: 25 CAPSULE ORAL at 21:22

## 2021-01-01 RX ADMIN — CALCIUM CARBONATE 500 MG: 500 TABLET, CHEWABLE ORAL at 09:05

## 2021-01-01 RX ADMIN — Medication 200 MCG/HR: at 05:36

## 2021-01-01 RX ADMIN — BARICITINIB 4 MG: 2 TABLET, FILM COATED ORAL at 00:23

## 2021-01-01 RX ADMIN — ALBUTEROL SULFATE 4 PUFF: 90 AEROSOL, METERED RESPIRATORY (INHALATION) at 13:18

## 2021-01-01 RX ADMIN — Medication 75 MCG/HR: at 05:04

## 2021-01-01 RX ADMIN — BUSPIRONE HYDROCHLORIDE 5 MG: 5 TABLET ORAL at 20:54

## 2021-01-01 RX ADMIN — PROPOFOL 150 MG: 10 INJECTION, EMULSION INTRAVENOUS at 14:45

## 2021-01-01 RX ADMIN — LEVOTHYROXINE SODIUM 125 MCG: 0.03 TABLET ORAL at 07:30

## 2021-01-01 RX ADMIN — BUSPIRONE HYDROCHLORIDE 5 MG: 5 TABLET ORAL at 08:36

## 2021-01-01 RX ADMIN — MONTELUKAST 10 MG: 10 TABLET, FILM COATED ORAL at 20:02

## 2021-01-01 RX ADMIN — Medication 2 PUFF: at 22:31

## 2021-01-01 RX ADMIN — BUDESONIDE AND FORMOTEROL FUMARATE DIHYDRATE 2 PUFF: 160; 4.5 AEROSOL RESPIRATORY (INHALATION) at 09:28

## 2021-01-01 RX ADMIN — FAMOTIDINE 20 MG: 10 INJECTION, SOLUTION INTRAVENOUS at 09:27

## 2021-01-01 RX ADMIN — MONTELUKAST 10 MG: 10 TABLET, FILM COATED ORAL at 23:15

## 2021-01-01 RX ADMIN — ACETAMINOPHEN 650 MG: 325 TABLET ORAL at 11:36

## 2021-01-01 RX ADMIN — PROPOFOL 50 MCG/KG/MIN: 10 INJECTION, EMULSION INTRAVENOUS at 23:16

## 2021-01-01 RX ADMIN — CEFEPIME HYDROCHLORIDE 2000 MG: 2 INJECTION, POWDER, FOR SOLUTION INTRAVENOUS at 13:52

## 2021-01-01 RX ADMIN — HYDROXYZINE PAMOATE 25 MG: 25 CAPSULE ORAL at 18:43

## 2021-01-01 RX ADMIN — ENOXAPARIN SODIUM 70 MG: 100 INJECTION SUBCUTANEOUS at 09:06

## 2021-01-01 RX ADMIN — TOCILIZUMAB 600 MG: 20 INJECTION, SOLUTION, CONCENTRATE INTRAVENOUS at 10:57

## 2021-01-01 RX ADMIN — MIDODRINE HYDROCHLORIDE 5 MG: 5 TABLET ORAL at 08:36

## 2021-01-01 RX ADMIN — ENOXAPARIN SODIUM 70 MG: 100 INJECTION SUBCUTANEOUS at 21:13

## 2021-01-01 RX ADMIN — BUDESONIDE AND FORMOTEROL FUMARATE DIHYDRATE 2 PUFF: 160; 4.5 AEROSOL RESPIRATORY (INHALATION) at 08:46

## 2021-01-01 RX ADMIN — PROPOFOL 70 MCG/KG/MIN: 10 INJECTION, EMULSION INTRAVENOUS at 01:19

## 2021-01-01 RX ADMIN — Medication 175 MCG/HR: at 17:13

## 2021-01-01 RX ADMIN — Medication 175 MCG/HR: at 22:13

## 2021-01-01 RX ADMIN — SODIUM CHLORIDE 25 ML: 9 INJECTION, SOLUTION INTRAVENOUS at 00:00

## 2021-01-01 RX ADMIN — GUAIFENESIN AND DEXTROMETHORPHAN 5 ML: 100; 10 SYRUP ORAL at 15:53

## 2021-01-01 RX ADMIN — CHLORHEXIDINE GLUCONATE 0.12% ORAL RINSE 15 ML: 1.2 LIQUID ORAL at 09:27

## 2021-01-01 RX ADMIN — PROPOFOL 70 MCG/KG/MIN: 10 INJECTION, EMULSION INTRAVENOUS at 07:47

## 2021-01-01 RX ADMIN — PROPOFOL 70 MCG/KG/MIN: 10 INJECTION, EMULSION INTRAVENOUS at 10:45

## 2021-01-01 RX ADMIN — CEFEPIME HYDROCHLORIDE 2000 MG: 2 INJECTION, POWDER, FOR SOLUTION INTRAVENOUS at 06:32

## 2021-01-01 RX ADMIN — VANCOMYCIN HYDROCHLORIDE 1250 MG: 5 INJECTION, POWDER, LYOPHILIZED, FOR SOLUTION INTRAVENOUS at 21:17

## 2021-01-01 RX ADMIN — CEFEPIME HYDROCHLORIDE 2000 MG: 2 INJECTION, POWDER, FOR SOLUTION INTRAVENOUS at 04:23

## 2021-01-01 RX ADMIN — BUSPIRONE HYDROCHLORIDE 5 MG: 5 TABLET ORAL at 21:21

## 2021-01-01 RX ADMIN — ALBUTEROL SULFATE 2 PUFF: 90 AEROSOL, METERED RESPIRATORY (INHALATION) at 11:02

## 2021-01-01 RX ADMIN — MIDODRINE HYDROCHLORIDE 10 MG: 5 TABLET ORAL at 12:39

## 2021-01-01 RX ADMIN — PROPOFOL 80 MCG/KG/MIN: 10 INJECTION, EMULSION INTRAVENOUS at 17:28

## 2021-01-01 RX ADMIN — CHLORHEXIDINE GLUCONATE 0.12% ORAL RINSE 15 ML: 1.2 LIQUID ORAL at 20:45

## 2021-01-01 RX ADMIN — ENOXAPARIN SODIUM 30 MG: 100 INJECTION SUBCUTANEOUS at 22:35

## 2021-01-01 RX ADMIN — DEXAMETHASONE SODIUM PHOSPHATE 10 MG: 10 INJECTION, SOLUTION INTRAMUSCULAR; INTRAVENOUS at 17:38

## 2021-01-01 RX ADMIN — ALBUTEROL SULFATE 4 PUFF: 90 AEROSOL, METERED RESPIRATORY (INHALATION) at 23:42

## 2021-01-01 RX ADMIN — CALCIUM CARBONATE 500 MG: 500 TABLET, CHEWABLE ORAL at 09:27

## 2021-01-01 RX ADMIN — MONTELUKAST 10 MG: 10 TABLET, FILM COATED ORAL at 21:21

## 2021-01-01 RX ADMIN — VANCOMYCIN HYDROCHLORIDE 1000 MG: 1 INJECTION, POWDER, LYOPHILIZED, FOR SOLUTION INTRAVENOUS at 22:15

## 2021-01-01 RX ADMIN — BUSPIRONE HYDROCHLORIDE 5 MG: 5 TABLET ORAL at 09:07

## 2021-01-01 RX ADMIN — Medication 2 PUFF: at 19:48

## 2021-01-01 RX ADMIN — PROPOFOL 70 MCG/KG/MIN: 10 INJECTION, EMULSION INTRAVENOUS at 00:23

## 2021-01-01 RX ADMIN — ALBUTEROL SULFATE 4 PUFF: 90 AEROSOL, METERED RESPIRATORY (INHALATION) at 08:03

## 2021-01-01 RX ADMIN — CALCIUM CARBONATE 500 MG: 500 TABLET, CHEWABLE ORAL at 09:52

## 2021-01-01 RX ADMIN — ALBUTEROL SULFATE 2 PUFF: 90 AEROSOL, METERED RESPIRATORY (INHALATION) at 15:25

## 2021-01-01 RX ADMIN — SODIUM CHLORIDE, PRESERVATIVE FREE 10 ML: 5 INJECTION INTRAVENOUS at 22:09

## 2021-01-01 RX ADMIN — SODIUM CHLORIDE, PRESERVATIVE FREE 10 ML: 5 INJECTION INTRAVENOUS at 11:11

## 2021-01-01 RX ADMIN — CEFEPIME HYDROCHLORIDE 2000 MG: 2 INJECTION, POWDER, FOR SOLUTION INTRAVENOUS at 22:52

## 2021-01-01 RX ADMIN — MIDAZOLAM HYDROCHLORIDE 10 MG/HR: 5 INJECTION INTRAMUSCULAR; INTRAVENOUS at 12:43

## 2021-01-01 RX ADMIN — PROPOFOL 70 MCG/KG/MIN: 10 INJECTION, EMULSION INTRAVENOUS at 12:11

## 2021-01-01 RX ADMIN — Medication 200 MCG/HR: at 12:01

## 2021-01-01 RX ADMIN — EPINEPHRINE 1 MG: 0.1 INJECTION, SOLUTION ENDOTRACHEAL; INTRACARDIAC; INTRAVENOUS at 02:28

## 2021-01-01 RX ADMIN — PROPOFOL 50 MCG/KG/MIN: 10 INJECTION, EMULSION INTRAVENOUS at 04:07

## 2021-01-01 RX ADMIN — MIDODRINE HYDROCHLORIDE 5 MG: 5 TABLET ORAL at 07:59

## 2021-01-01 RX ADMIN — SALINE NASAL SPRAY 1 SPRAY: 1.5 SOLUTION NASAL at 10:05

## 2021-01-01 RX ADMIN — ALBUTEROL SULFATE 2 PUFF: 90 AEROSOL, METERED RESPIRATORY (INHALATION) at 21:28

## 2021-01-01 RX ADMIN — PROPOFOL 70 MCG/KG/MIN: 10 INJECTION, EMULSION INTRAVENOUS at 02:59

## 2021-01-01 RX ADMIN — ALBUTEROL SULFATE 2 PUFF: 90 AEROSOL, METERED RESPIRATORY (INHALATION) at 07:25

## 2021-01-01 RX ADMIN — MIDAZOLAM HYDROCHLORIDE 9 MG/HR: 5 INJECTION INTRAMUSCULAR; INTRAVENOUS at 12:13

## 2021-01-01 RX ADMIN — ENOXAPARIN SODIUM 70 MG: 100 INJECTION SUBCUTANEOUS at 07:33

## 2021-01-01 RX ADMIN — Medication 175 MCG/HR: at 22:35

## 2021-01-01 RX ADMIN — PROPOFOL 70 MCG/KG/MIN: 10 INJECTION, EMULSION INTRAVENOUS at 11:04

## 2021-01-01 RX ADMIN — CEFEPIME HYDROCHLORIDE 2000 MG: 2 INJECTION, POWDER, FOR SOLUTION INTRAVENOUS at 05:41

## 2021-01-01 RX ADMIN — SODIUM CHLORIDE, PRESERVATIVE FREE 10 ML: 5 INJECTION INTRAVENOUS at 23:16

## 2021-01-01 RX ADMIN — Medication 75 MCG/HR: at 21:32

## 2021-01-01 RX ADMIN — Medication 75 MCG/HR: at 04:48

## 2021-01-01 RX ADMIN — FAMOTIDINE 20 MG: 10 INJECTION, SOLUTION INTRAVENOUS at 21:11

## 2021-01-01 RX ADMIN — SODIUM CHLORIDE, PRESERVATIVE FREE 10 ML: 5 INJECTION INTRAVENOUS at 08:46

## 2021-01-01 RX ADMIN — ALBUTEROL SULFATE 4 PUFF: 90 AEROSOL, METERED RESPIRATORY (INHALATION) at 11:37

## 2021-01-01 RX ADMIN — SODIUM CHLORIDE: 9 INJECTION, SOLUTION INTRAVENOUS at 15:51

## 2021-01-01 RX ADMIN — Medication 2 PUFF: at 07:27

## 2021-01-01 RX ADMIN — ALBUTEROL SULFATE 4 PUFF: 90 AEROSOL, METERED RESPIRATORY (INHALATION) at 20:37

## 2021-01-01 RX ADMIN — BUSPIRONE HYDROCHLORIDE 5 MG: 5 TABLET ORAL at 21:14

## 2021-01-01 RX ADMIN — SODIUM CHLORIDE 25 ML: 9 INJECTION, SOLUTION INTRAVENOUS at 08:54

## 2021-01-01 RX ADMIN — GUAIFENESIN AND DEXTROMETHORPHAN 5 ML: 100; 10 SYRUP ORAL at 00:57

## 2021-01-01 RX ADMIN — Medication 0.2 MG: at 13:16

## 2021-01-01 RX ADMIN — PROPOFOL 70 MCG/KG/MIN: 10 INJECTION, EMULSION INTRAVENOUS at 13:44

## 2021-01-01 RX ADMIN — DEXTROSE MONOHYDRATE 10 MG/HR: 50 INJECTION, SOLUTION INTRAVENOUS at 21:02

## 2021-01-01 RX ADMIN — DEXAMETHASONE SODIUM PHOSPHATE 10 MG: 10 INJECTION, SOLUTION INTRAMUSCULAR; INTRAVENOUS at 09:12

## 2021-01-01 RX ADMIN — MONTELUKAST 10 MG: 10 TABLET, FILM COATED ORAL at 20:45

## 2021-01-01 RX ADMIN — BUSPIRONE HYDROCHLORIDE 5 MG: 5 TABLET ORAL at 22:35

## 2021-01-01 RX ADMIN — CHLORHEXIDINE GLUCONATE 0.12% ORAL RINSE 15 ML: 1.2 LIQUID ORAL at 09:52

## 2021-01-01 RX ADMIN — PHENYLEPHRINE HYDROCHLORIDE 105 MCG/MIN: 10 INJECTION INTRAVENOUS at 10:48

## 2021-01-01 RX ADMIN — ALBUTEROL SULFATE 4 PUFF: 90 AEROSOL, METERED RESPIRATORY (INHALATION) at 17:00

## 2021-01-01 RX ADMIN — PROPOFOL 70 MCG/KG/MIN: 10 INJECTION, EMULSION INTRAVENOUS at 17:12

## 2021-01-01 RX ADMIN — PROPOFOL 80 MCG/KG/MIN: 10 INJECTION, EMULSION INTRAVENOUS at 13:39

## 2021-01-01 RX ADMIN — Medication 150 MCG/HR: at 14:38

## 2021-01-01 RX ADMIN — MIDODRINE HYDROCHLORIDE 5 MG: 5 TABLET ORAL at 09:05

## 2021-01-01 RX ADMIN — CHLORHEXIDINE GLUCONATE 0.12% ORAL RINSE 15 ML: 1.2 LIQUID ORAL at 20:02

## 2021-01-01 RX ADMIN — LEVOTHYROXINE SODIUM 125 MCG: 0.03 TABLET ORAL at 06:54

## 2021-01-01 RX ADMIN — MONTELUKAST 10 MG: 10 TABLET, FILM COATED ORAL at 21:02

## 2021-01-01 RX ADMIN — SODIUM CHLORIDE: 9 INJECTION, SOLUTION INTRAVENOUS at 06:22

## 2021-01-01 RX ADMIN — PROPOFOL 60 MCG/KG/MIN: 10 INJECTION, EMULSION INTRAVENOUS at 05:13

## 2021-01-01 RX ADMIN — ENOXAPARIN SODIUM 70 MG: 100 INJECTION SUBCUTANEOUS at 21:34

## 2021-01-01 RX ADMIN — ROCURONIUM BROMIDE 50 MG: 10 INJECTION INTRAVENOUS at 14:48

## 2021-01-01 RX ADMIN — LEVOTHYROXINE SODIUM 125 MCG: 0.03 TABLET ORAL at 05:51

## 2021-01-01 RX ADMIN — ACETAMINOPHEN 650 MG: 325 TABLET ORAL at 04:44

## 2021-01-01 RX ADMIN — Medication 2000 UNITS: at 08:45

## 2021-01-01 RX ADMIN — SODIUM CHLORIDE: 9 INJECTION, SOLUTION INTRAVENOUS at 18:41

## 2021-01-01 RX ADMIN — CHLORHEXIDINE GLUCONATE 0.12% ORAL RINSE 15 ML: 1.2 LIQUID ORAL at 09:05

## 2021-01-01 RX ADMIN — ALBUTEROL SULFATE 2 PUFF: 90 AEROSOL, METERED RESPIRATORY (INHALATION) at 19:46

## 2021-01-01 RX ADMIN — Medication 2 PUFF: at 15:27

## 2021-01-01 RX ADMIN — BUDESONIDE AND FORMOTEROL FUMARATE DIHYDRATE 2 PUFF: 160; 4.5 AEROSOL RESPIRATORY (INHALATION) at 19:18

## 2021-01-01 RX ADMIN — BUSPIRONE HYDROCHLORIDE 5 MG: 5 TABLET ORAL at 21:31

## 2021-01-01 RX ADMIN — VANCOMYCIN HYDROCHLORIDE 1250 MG: 5 INJECTION, POWDER, LYOPHILIZED, FOR SOLUTION INTRAVENOUS at 21:45

## 2021-01-01 RX ADMIN — MIDODRINE HYDROCHLORIDE 10 MG: 5 TABLET ORAL at 17:14

## 2021-01-01 RX ADMIN — CALCIUM CARBONATE 500 MG: 500 TABLET, CHEWABLE ORAL at 09:48

## 2021-01-01 RX ADMIN — ALBUTEROL SULFATE 4 PUFF: 90 AEROSOL, METERED RESPIRATORY (INHALATION) at 09:25

## 2021-01-01 RX ADMIN — SODIUM CHLORIDE, PRESERVATIVE FREE 10 ML: 5 INJECTION INTRAVENOUS at 08:30

## 2021-01-01 RX ADMIN — PROPOFOL 80 MCG/KG/MIN: 10 INJECTION, EMULSION INTRAVENOUS at 18:24

## 2021-01-01 RX ADMIN — PROPOFOL 70 MCG/KG/MIN: 10 INJECTION, EMULSION INTRAVENOUS at 14:50

## 2021-01-01 RX ADMIN — ALBUTEROL SULFATE 4 PUFF: 90 AEROSOL, METERED RESPIRATORY (INHALATION) at 21:08

## 2021-01-01 RX ADMIN — PROPOFOL 70 MCG/KG/MIN: 10 INJECTION, EMULSION INTRAVENOUS at 18:13

## 2021-01-01 RX ADMIN — ENOXAPARIN SODIUM 70 MG: 100 INJECTION SUBCUTANEOUS at 09:52

## 2021-01-01 RX ADMIN — MIDAZOLAM HYDROCHLORIDE 7 MG/HR: 5 INJECTION INTRAMUSCULAR; INTRAVENOUS at 21:25

## 2021-01-01 RX ADMIN — ALBUTEROL SULFATE 4 PUFF: 90 AEROSOL, METERED RESPIRATORY (INHALATION) at 12:40

## 2021-01-01 RX ADMIN — ALBUTEROL SULFATE 4 PUFF: 90 AEROSOL, METERED RESPIRATORY (INHALATION) at 07:55

## 2021-01-01 RX ADMIN — VANCOMYCIN HYDROCHLORIDE 1000 MG: 1 INJECTION, POWDER, LYOPHILIZED, FOR SOLUTION INTRAVENOUS at 22:02

## 2021-01-01 RX ADMIN — Medication 50 MEQ: at 02:37

## 2021-01-01 RX ADMIN — BARICITINIB 4 MG: 2 TABLET, FILM COATED ORAL at 20:02

## 2021-01-01 RX ADMIN — ALBUTEROL SULFATE 4 PUFF: 90 AEROSOL, METERED RESPIRATORY (INHALATION) at 03:37

## 2021-01-01 RX ADMIN — PROPOFOL 75 MCG/KG/MIN: 10 INJECTION, EMULSION INTRAVENOUS at 22:20

## 2021-01-01 RX ADMIN — ALBUTEROL SULFATE 2 PUFF: 90 AEROSOL, METERED RESPIRATORY (INHALATION) at 20:12

## 2021-01-01 RX ADMIN — ALBUTEROL SULFATE 4 PUFF: 90 AEROSOL, METERED RESPIRATORY (INHALATION) at 23:37

## 2021-01-01 RX ADMIN — MONTELUKAST 10 MG: 10 TABLET, FILM COATED ORAL at 20:41

## 2021-01-01 RX ADMIN — ALBUTEROL SULFATE 4 PUFF: 90 AEROSOL, METERED RESPIRATORY (INHALATION) at 11:49

## 2021-01-01 RX ADMIN — MIDODRINE HYDROCHLORIDE 5 MG: 5 TABLET ORAL at 07:32

## 2021-01-01 RX ADMIN — ALBUTEROL SULFATE 4 PUFF: 90 AEROSOL, METERED RESPIRATORY (INHALATION) at 15:10

## 2021-01-01 RX ADMIN — HYDROXYZINE PAMOATE 25 MG: 25 CAPSULE ORAL at 17:14

## 2021-01-01 RX ADMIN — TRAZODONE HYDROCHLORIDE 50 MG: 50 TABLET ORAL at 20:45

## 2021-01-01 RX ADMIN — BENZONATATE 100 MG: 100 CAPSULE ORAL at 12:24

## 2021-01-01 RX ADMIN — Medication 2 PUFF: at 08:46

## 2021-01-01 RX ADMIN — SALINE NASAL SPRAY 1 SPRAY: 1.5 SOLUTION NASAL at 15:13

## 2021-01-01 RX ADMIN — LEVOTHYROXINE SODIUM 125 MCG: 0.03 TABLET ORAL at 06:15

## 2021-01-01 RX ADMIN — Medication 125 MCG/HR: at 02:24

## 2021-01-01 RX ADMIN — CEFEPIME HYDROCHLORIDE 2000 MG: 2 INJECTION, POWDER, FOR SOLUTION INTRAVENOUS at 01:03

## 2021-01-01 RX ADMIN — LEVOTHYROXINE SODIUM 125 MCG: 0.03 TABLET ORAL at 06:37

## 2021-01-01 RX ADMIN — PROPOFOL 80 MCG/KG/MIN: 10 INJECTION, EMULSION INTRAVENOUS at 01:30

## 2021-01-01 RX ADMIN — PROPOFOL 65 MCG/KG/MIN: 10 INJECTION, EMULSION INTRAVENOUS at 00:38

## 2021-01-01 RX ADMIN — Medication 150 MCG/HR: at 21:46

## 2021-01-01 RX ADMIN — BARICITINIB 4 MG: 2 TABLET, FILM COATED ORAL at 23:15

## 2021-01-01 RX ADMIN — BUDESONIDE AND FORMOTEROL FUMARATE DIHYDRATE 2 PUFF: 160; 4.5 AEROSOL RESPIRATORY (INHALATION) at 19:46

## 2021-01-01 RX ADMIN — BUSPIRONE HYDROCHLORIDE 5 MG: 5 TABLET ORAL at 20:46

## 2021-01-01 RX ADMIN — BARICITINIB 4 MG: 2 TABLET, FILM COATED ORAL at 22:09

## 2021-01-01 RX ADMIN — MIDODRINE HYDROCHLORIDE 10 MG: 5 TABLET ORAL at 13:13

## 2021-01-01 RX ADMIN — SODIUM CHLORIDE: 9 INJECTION, SOLUTION INTRAVENOUS at 10:05

## 2021-01-01 RX ADMIN — DILTIAZEM HYDROCHLORIDE 120 MG: 120 CAPSULE, COATED, EXTENDED RELEASE ORAL at 21:22

## 2021-01-01 RX ADMIN — BUSPIRONE HYDROCHLORIDE 5 MG: 5 TABLET ORAL at 23:15

## 2021-01-01 RX ADMIN — PROPOFOL 70 MCG/KG/MIN: 10 INJECTION, EMULSION INTRAVENOUS at 04:10

## 2021-01-01 RX ADMIN — HYDROXYZINE PAMOATE 25 MG: 25 CAPSULE ORAL at 06:13

## 2021-01-01 RX ADMIN — ALBUTEROL SULFATE 4 PUFF: 90 AEROSOL, METERED RESPIRATORY (INHALATION) at 22:23

## 2021-01-01 RX ADMIN — Medication 175 MCG/HR: at 10:12

## 2021-01-01 RX ADMIN — Medication 25 MCG/HR: at 15:33

## 2021-01-01 RX ADMIN — ACETAMINOPHEN 650 MG: 325 TABLET ORAL at 05:10

## 2021-01-01 RX ADMIN — Medication 75 MCG/HR: at 14:18

## 2021-01-01 RX ADMIN — Medication 170 MCG/HR: at 00:57

## 2021-01-01 RX ADMIN — CHLORHEXIDINE GLUCONATE 0.12% ORAL RINSE 15 ML: 1.2 LIQUID ORAL at 08:23

## 2021-01-01 RX ADMIN — Medication 2 PUFF: at 21:28

## 2021-01-01 RX ADMIN — BUSPIRONE HYDROCHLORIDE 5 MG: 5 TABLET ORAL at 08:43

## 2021-01-01 RX ADMIN — FAMOTIDINE 20 MG: 10 INJECTION, SOLUTION INTRAVENOUS at 08:33

## 2021-01-01 RX ADMIN — ACETAMINOPHEN 650 MG: 650 SUPPOSITORY RECTAL at 17:48

## 2021-01-01 RX ADMIN — Medication 2000 UNITS: at 09:54

## 2021-01-01 RX ADMIN — ALBUTEROL SULFATE 4 PUFF: 90 AEROSOL, METERED RESPIRATORY (INHALATION) at 23:40

## 2021-01-01 RX ADMIN — ALBUTEROL SULFATE 4 PUFF: 90 AEROSOL, METERED RESPIRATORY (INHALATION) at 06:00

## 2021-01-01 RX ADMIN — DILTIAZEM HYDROCHLORIDE 120 MG: 120 CAPSULE, COATED, EXTENDED RELEASE ORAL at 08:43

## 2021-01-01 RX ADMIN — BARICITINIB 4 MG: 2 TABLET, FILM COATED ORAL at 20:46

## 2021-01-01 RX ADMIN — DILTIAZEM HYDROCHLORIDE 30 MG: 30 TABLET, FILM COATED ORAL at 09:21

## 2021-01-01 RX ADMIN — FAMOTIDINE 20 MG: 10 INJECTION, SOLUTION INTRAVENOUS at 21:02

## 2021-01-01 RX ADMIN — SODIUM CHLORIDE, PRESERVATIVE FREE 10 ML: 5 INJECTION INTRAVENOUS at 11:14

## 2021-01-01 RX ADMIN — SODIUM CHLORIDE, PRESERVATIVE FREE 10 ML: 5 INJECTION INTRAVENOUS at 20:41

## 2021-01-01 RX ADMIN — SODIUM CHLORIDE, PRESERVATIVE FREE 10 ML: 5 INJECTION INTRAVENOUS at 08:24

## 2021-01-01 RX ADMIN — HYDROXYZINE PAMOATE 25 MG: 25 CAPSULE ORAL at 00:58

## 2021-01-01 RX ADMIN — FUROSEMIDE 5 MG/HR: 10 INJECTION, SOLUTION INTRAVENOUS at 11:04

## 2021-01-01 RX ADMIN — DEXTROSE MONOHYDRATE 5 MG/HR: 50 INJECTION, SOLUTION INTRAVENOUS at 16:55

## 2021-01-01 RX ADMIN — ACETAMINOPHEN 650 MG: 325 TABLET ORAL at 15:53

## 2021-01-01 RX ADMIN — GUAIFENESIN AND DEXTROMETHORPHAN 5 ML: 100; 10 SYRUP ORAL at 17:38

## 2021-01-01 RX ADMIN — PROPOFOL 70 MCG/KG/MIN: 10 INJECTION, EMULSION INTRAVENOUS at 17:53

## 2021-01-01 RX ADMIN — MIDODRINE HYDROCHLORIDE 5 MG: 5 TABLET ORAL at 07:53

## 2021-01-01 RX ADMIN — MIDODRINE HYDROCHLORIDE 5 MG: 5 TABLET ORAL at 16:43

## 2021-01-01 RX ADMIN — VANCOMYCIN HYDROCHLORIDE 1250 MG: 5 INJECTION, POWDER, LYOPHILIZED, FOR SOLUTION INTRAVENOUS at 10:09

## 2021-01-01 RX ADMIN — MIDODRINE HYDROCHLORIDE 5 MG: 5 TABLET ORAL at 17:16

## 2021-01-01 RX ADMIN — Medication 200 MCG/HR: at 14:17

## 2021-01-01 RX ADMIN — PROPOFOL 70 MCG/KG/MIN: 10 INJECTION, EMULSION INTRAVENOUS at 18:45

## 2021-01-01 RX ADMIN — ALBUTEROL SULFATE 4 PUFF: 90 AEROSOL, METERED RESPIRATORY (INHALATION) at 04:01

## 2021-01-01 RX ADMIN — FAMOTIDINE 20 MG: 10 INJECTION, SOLUTION INTRAVENOUS at 09:51

## 2021-01-01 RX ADMIN — ENOXAPARIN SODIUM 70 MG: 100 INJECTION SUBCUTANEOUS at 22:08

## 2021-01-01 RX ADMIN — DEXAMETHASONE SODIUM PHOSPHATE 6 MG: 10 INJECTION, SOLUTION INTRAMUSCULAR; INTRAVENOUS at 20:15

## 2021-01-01 RX ADMIN — ENOXAPARIN SODIUM 70 MG: 100 INJECTION SUBCUTANEOUS at 20:02

## 2021-01-01 RX ADMIN — ALBUTEROL SULFATE 4 PUFF: 90 AEROSOL, METERED RESPIRATORY (INHALATION) at 04:25

## 2021-01-01 RX ADMIN — EPINEPHRINE 1 MG: 0.1 INJECTION, SOLUTION ENDOTRACHEAL; INTRACARDIAC; INTRAVENOUS at 02:32

## 2021-01-01 RX ADMIN — SODIUM CHLORIDE, POTASSIUM CHLORIDE, SODIUM LACTATE AND CALCIUM CHLORIDE: 600; 310; 30; 20 INJECTION, SOLUTION INTRAVENOUS at 01:27

## 2021-01-01 RX ADMIN — Medication 125 MCG/HR: at 06:48

## 2021-01-01 RX ADMIN — FAMOTIDINE 20 MG: 10 INJECTION, SOLUTION INTRAVENOUS at 21:31

## 2021-01-01 RX ADMIN — ALBUTEROL SULFATE 2 PUFF: 90 AEROSOL, METERED RESPIRATORY (INHALATION) at 20:39

## 2021-01-01 RX ADMIN — MIDAZOLAM HYDROCHLORIDE 5 MG/HR: 5 INJECTION INTRAMUSCULAR; INTRAVENOUS at 11:37

## 2021-01-01 RX ADMIN — MIDODRINE HYDROCHLORIDE 5 MG: 5 TABLET ORAL at 20:02

## 2021-01-01 RX ADMIN — HYDROXYZINE PAMOATE 25 MG: 25 CAPSULE ORAL at 10:43

## 2021-01-01 RX ADMIN — BUSPIRONE HYDROCHLORIDE 5 MG: 5 TABLET ORAL at 10:33

## 2021-01-01 RX ADMIN — Medication 2000 UNITS: at 09:27

## 2021-01-01 RX ADMIN — METOPROLOL TARTRATE 5 MG: 5 INJECTION INTRAVENOUS at 02:38

## 2021-01-01 RX ADMIN — ENOXAPARIN SODIUM 30 MG: 100 INJECTION SUBCUTANEOUS at 20:34

## 2021-01-01 RX ADMIN — BUSPIRONE HYDROCHLORIDE 5 MG: 5 TABLET ORAL at 21:42

## 2021-01-01 RX ADMIN — LEVOTHYROXINE SODIUM 125 MCG: 0.03 TABLET ORAL at 06:28

## 2021-01-01 RX ADMIN — HYDROXYZINE PAMOATE 25 MG: 25 CAPSULE ORAL at 09:16

## 2021-01-01 RX ADMIN — MIDODRINE HYDROCHLORIDE 10 MG: 5 TABLET ORAL at 17:30

## 2021-01-01 RX ADMIN — BUSPIRONE HYDROCHLORIDE 5 MG: 5 TABLET ORAL at 07:54

## 2021-01-01 RX ADMIN — ACETAMINOPHEN 650 MG: 325 TABLET ORAL at 18:31

## 2021-01-01 RX ADMIN — Medication 175 MCG/HR: at 15:01

## 2021-01-01 RX ADMIN — PROPOFOL 70 MCG/KG/MIN: 10 INJECTION, EMULSION INTRAVENOUS at 04:45

## 2021-01-01 RX ADMIN — CALCIUM CARBONATE 500 MG: 500 TABLET, CHEWABLE ORAL at 07:32

## 2021-01-01 RX ADMIN — PROPOFOL 70 MCG/KG/MIN: 10 INJECTION, EMULSION INTRAVENOUS at 21:36

## 2021-01-01 RX ADMIN — ALBUTEROL SULFATE 4 PUFF: 90 AEROSOL, METERED RESPIRATORY (INHALATION) at 00:27

## 2021-01-01 RX ADMIN — Medication 125 MCG/HR: at 22:21

## 2021-01-01 RX ADMIN — ENOXAPARIN SODIUM 70 MG: 100 INJECTION SUBCUTANEOUS at 10:32

## 2021-01-01 RX ADMIN — ALBUTEROL SULFATE 4 PUFF: 90 AEROSOL, METERED RESPIRATORY (INHALATION) at 20:26

## 2021-01-01 RX ADMIN — ACETAMINOPHEN 650 MG: 325 TABLET ORAL at 05:50

## 2021-01-01 RX ADMIN — BUSPIRONE HYDROCHLORIDE 5 MG: 5 TABLET ORAL at 09:52

## 2021-01-01 RX ADMIN — SODIUM CHLORIDE, PRESERVATIVE FREE 10 ML: 5 INJECTION INTRAVENOUS at 09:07

## 2021-01-01 RX ADMIN — BUSPIRONE HYDROCHLORIDE 5 MG: 5 TABLET ORAL at 08:23

## 2021-01-01 RX ADMIN — ACETAMINOPHEN 650 MG: 325 TABLET ORAL at 16:40

## 2021-01-01 RX ADMIN — PROPOFOL 70 MCG/KG/MIN: 10 INJECTION, EMULSION INTRAVENOUS at 21:21

## 2021-01-01 RX ADMIN — CHLORHEXIDINE GLUCONATE 0.12% ORAL RINSE 15 ML: 1.2 LIQUID ORAL at 08:28

## 2021-01-01 RX ADMIN — ALBUTEROL SULFATE 2 PUFF: 90 AEROSOL, METERED RESPIRATORY (INHALATION) at 19:48

## 2021-01-01 RX ADMIN — Medication 2 MCG/MIN: at 06:41

## 2021-01-01 RX ADMIN — CEFEPIME HYDROCHLORIDE 2000 MG: 2 INJECTION, POWDER, FOR SOLUTION INTRAVENOUS at 22:05

## 2021-01-01 RX ADMIN — ALBUTEROL SULFATE 4 PUFF: 90 AEROSOL, METERED RESPIRATORY (INHALATION) at 16:59

## 2021-01-01 RX ADMIN — CEFEPIME HYDROCHLORIDE 2000 MG: 2 INJECTION, POWDER, FOR SOLUTION INTRAVENOUS at 05:47

## 2021-01-01 RX ADMIN — DILTIAZEM HYDROCHLORIDE 30 MG: 30 TABLET, FILM COATED ORAL at 05:47

## 2021-01-01 RX ADMIN — ENOXAPARIN SODIUM 30 MG: 100 INJECTION SUBCUTANEOUS at 08:30

## 2021-01-01 RX ADMIN — FAMOTIDINE 20 MG: 10 INJECTION, SOLUTION INTRAVENOUS at 22:08

## 2021-01-01 RX ADMIN — BUDESONIDE AND FORMOTEROL FUMARATE DIHYDRATE 2 PUFF: 160; 4.5 AEROSOL RESPIRATORY (INHALATION) at 19:48

## 2021-01-01 RX ADMIN — ALBUTEROL SULFATE 2 PUFF: 90 AEROSOL, METERED RESPIRATORY (INHALATION) at 11:09

## 2021-01-01 RX ADMIN — ENOXAPARIN SODIUM 30 MG: 100 INJECTION SUBCUTANEOUS at 20:54

## 2021-01-01 RX ADMIN — BARICITINIB 2 MG: 2 TABLET, FILM COATED ORAL at 22:08

## 2021-01-01 RX ADMIN — Medication 2 PUFF: at 11:11

## 2021-01-01 RX ADMIN — ALBUTEROL SULFATE 4 PUFF: 90 AEROSOL, METERED RESPIRATORY (INHALATION) at 08:14

## 2021-01-01 RX ADMIN — Medication 2 PUFF: at 20:39

## 2021-01-01 RX ADMIN — CEFEPIME HYDROCHLORIDE 2000 MG: 2 INJECTION, POWDER, FOR SOLUTION INTRAVENOUS at 23:19

## 2021-01-01 RX ADMIN — CEFEPIME HYDROCHLORIDE 2000 MG: 2 INJECTION, POWDER, FOR SOLUTION INTRAVENOUS at 23:31

## 2021-01-01 RX ADMIN — ALBUTEROL SULFATE 4 PUFF: 90 AEROSOL, METERED RESPIRATORY (INHALATION) at 23:53

## 2021-01-01 RX ADMIN — HYDROXYZINE PAMOATE 25 MG: 25 CAPSULE ORAL at 08:53

## 2021-01-01 RX ADMIN — CHLORHEXIDINE GLUCONATE 0.12% ORAL RINSE 15 ML: 1.2 LIQUID ORAL at 21:14

## 2021-01-01 RX ADMIN — DEXTROSE MONOHYDRATE 5 MG/HR: 50 INJECTION, SOLUTION INTRAVENOUS at 15:39

## 2021-01-01 RX ADMIN — CEFEPIME HYDROCHLORIDE 2000 MG: 2 INJECTION, POWDER, FOR SOLUTION INTRAVENOUS at 14:52

## 2021-01-01 RX ADMIN — ENOXAPARIN SODIUM 70 MG: 100 INJECTION SUBCUTANEOUS at 08:24

## 2021-01-01 RX ADMIN — DEXTROSE MONOHYDRATE 5 MG/HR: 50 INJECTION, SOLUTION INTRAVENOUS at 10:55

## 2021-01-01 RX ADMIN — DEXTROSE MONOHYDRATE 5 MG/HR: 50 INJECTION, SOLUTION INTRAVENOUS at 02:44

## 2021-01-01 RX ADMIN — SODIUM CHLORIDE, PRESERVATIVE FREE 10 ML: 5 INJECTION INTRAVENOUS at 21:14

## 2021-01-01 RX ADMIN — DEXAMETHASONE SODIUM PHOSPHATE 10 MG: 10 INJECTION, SOLUTION INTRAMUSCULAR; INTRAVENOUS at 07:59

## 2021-01-01 RX ADMIN — ALBUTEROL SULFATE 4 PUFF: 90 AEROSOL, METERED RESPIRATORY (INHALATION) at 14:54

## 2021-01-01 RX ADMIN — ALBUTEROL SULFATE 4 PUFF: 90 AEROSOL, METERED RESPIRATORY (INHALATION) at 12:10

## 2021-01-01 RX ADMIN — MIDODRINE HYDROCHLORIDE 5 MG: 5 TABLET ORAL at 09:26

## 2021-01-01 RX ADMIN — SODIUM CHLORIDE 25 ML: 9 INJECTION, SOLUTION INTRAVENOUS at 21:54

## 2021-01-01 RX ADMIN — LEVOTHYROXINE SODIUM 125 MCG: 0.03 TABLET ORAL at 07:32

## 2021-01-01 RX ADMIN — MONTELUKAST 10 MG: 10 TABLET, FILM COATED ORAL at 21:14

## 2021-01-01 RX ADMIN — PROPOFOL 80 MCG/KG/MIN: 10 INJECTION, EMULSION INTRAVENOUS at 10:39

## 2021-01-01 RX ADMIN — BUSPIRONE HYDROCHLORIDE 5 MG: 5 TABLET ORAL at 11:13

## 2021-01-01 RX ADMIN — MONTELUKAST 10 MG: 10 TABLET, FILM COATED ORAL at 22:35

## 2021-01-01 RX ADMIN — BUSPIRONE HYDROCHLORIDE 5 MG: 5 TABLET ORAL at 08:45

## 2021-01-01 RX ADMIN — MIDODRINE HYDROCHLORIDE 5 MG: 5 TABLET ORAL at 11:49

## 2021-01-01 RX ADMIN — MIDODRINE HYDROCHLORIDE 5 MG: 5 TABLET ORAL at 21:02

## 2021-01-01 RX ADMIN — IOPAMIDOL 75 ML: 755 INJECTION, SOLUTION INTRAVENOUS at 20:59

## 2021-01-01 RX ADMIN — FAMOTIDINE 20 MG: 10 INJECTION, SOLUTION INTRAVENOUS at 07:31

## 2021-01-01 RX ADMIN — SODIUM CHLORIDE, PRESERVATIVE FREE 10 ML: 5 INJECTION INTRAVENOUS at 10:35

## 2021-01-01 RX ADMIN — ALBUTEROL SULFATE 2 PUFF: 90 AEROSOL, METERED RESPIRATORY (INHALATION) at 09:28

## 2021-01-01 RX ADMIN — SODIUM CHLORIDE, PRESERVATIVE FREE 10 ML: 5 INJECTION INTRAVENOUS at 21:31

## 2021-01-01 RX ADMIN — HYDROXYZINE PAMOATE 25 MG: 25 CAPSULE ORAL at 15:53

## 2021-01-01 RX ADMIN — PROPOFOL 80 MCG/KG/MIN: 10 INJECTION, EMULSION INTRAVENOUS at 01:12

## 2021-01-01 RX ADMIN — PROPOFOL 70 MCG/KG/MIN: 10 INJECTION, EMULSION INTRAVENOUS at 10:58

## 2021-01-01 RX ADMIN — PROPOFOL 50 MCG/KG/MIN: 10 INJECTION, EMULSION INTRAVENOUS at 19:28

## 2021-01-01 RX ADMIN — CHLORHEXIDINE GLUCONATE 0.12% ORAL RINSE 15 ML: 1.2 LIQUID ORAL at 21:02

## 2021-01-01 RX ADMIN — DILTIAZEM HYDROCHLORIDE 30 MG: 30 TABLET, FILM COATED ORAL at 22:08

## 2021-01-01 RX ADMIN — Medication 175 MCG/HR: at 12:06

## 2021-01-01 RX ADMIN — DEXAMETHASONE SODIUM PHOSPHATE 10 MG: 10 INJECTION, SOLUTION INTRAMUSCULAR; INTRAVENOUS at 10:32

## 2021-01-01 RX ADMIN — MIDODRINE HYDROCHLORIDE 5 MG: 5 TABLET ORAL at 10:32

## 2021-01-01 RX ADMIN — BARICITINIB 4 MG: 2 TABLET, FILM COATED ORAL at 21:17

## 2021-01-01 RX ADMIN — ENOXAPARIN SODIUM 70 MG: 100 INJECTION SUBCUTANEOUS at 21:39

## 2021-01-01 RX ADMIN — ALBUTEROL SULFATE 4 PUFF: 90 AEROSOL, METERED RESPIRATORY (INHALATION) at 17:13

## 2021-01-01 RX ADMIN — BUSPIRONE HYDROCHLORIDE 5 MG: 5 TABLET ORAL at 09:27

## 2021-01-01 RX ADMIN — Medication 2000 UNITS: at 07:56

## 2021-01-01 RX ADMIN — SODIUM CHLORIDE, PRESERVATIVE FREE 10 ML: 5 INJECTION INTRAVENOUS at 07:31

## 2021-01-01 RX ADMIN — Medication 2 PUFF: at 11:02

## 2021-01-01 RX ADMIN — ALBUTEROL SULFATE 4 PUFF: 90 AEROSOL, METERED RESPIRATORY (INHALATION) at 09:10

## 2021-01-01 RX ADMIN — PROPOFOL 70 MCG/KG/MIN: 10 INJECTION, EMULSION INTRAVENOUS at 00:54

## 2021-01-01 RX ADMIN — CEFEPIME HYDROCHLORIDE 2000 MG: 2 INJECTION, POWDER, FOR SOLUTION INTRAVENOUS at 22:08

## 2021-01-01 RX ADMIN — ALBUTEROL SULFATE 4 PUFF: 90 AEROSOL, METERED RESPIRATORY (INHALATION) at 15:26

## 2021-01-01 RX ADMIN — DILTIAZEM HYDROCHLORIDE 120 MG: 120 CAPSULE, COATED, EXTENDED RELEASE ORAL at 20:40

## 2021-01-01 RX ADMIN — FAMOTIDINE 20 MG: 10 INJECTION, SOLUTION INTRAVENOUS at 21:41

## 2021-01-01 RX ADMIN — SODIUM CHLORIDE, PRESERVATIVE FREE 10 ML: 5 INJECTION INTRAVENOUS at 09:31

## 2021-01-01 RX ADMIN — ALBUTEROL SULFATE 4 PUFF: 90 AEROSOL, METERED RESPIRATORY (INHALATION) at 07:58

## 2021-01-01 RX ADMIN — PROPOFOL 50 MCG/KG/MIN: 10 INJECTION, EMULSION INTRAVENOUS at 09:25

## 2021-01-01 RX ADMIN — SODIUM CHLORIDE, PRESERVATIVE FREE 10 ML: 5 INJECTION INTRAVENOUS at 20:42

## 2021-01-01 RX ADMIN — METOPROLOL TARTRATE 2.5 MG: 5 INJECTION INTRAVENOUS at 22:45

## 2021-01-01 RX ADMIN — DEXAMETHASONE SODIUM PHOSPHATE 10 MG: 10 INJECTION, SOLUTION INTRAMUSCULAR; INTRAVENOUS at 17:33

## 2021-01-01 RX ADMIN — ENOXAPARIN SODIUM 70 MG: 100 INJECTION SUBCUTANEOUS at 21:14

## 2021-01-01 RX ADMIN — MONTELUKAST 10 MG: 10 TABLET, FILM COATED ORAL at 21:12

## 2021-01-01 RX ADMIN — MIDODRINE HYDROCHLORIDE 5 MG: 5 TABLET ORAL at 18:31

## 2021-01-01 RX ADMIN — FAMOTIDINE 20 MG: 10 INJECTION, SOLUTION INTRAVENOUS at 21:39

## 2021-01-01 RX ADMIN — MIDODRINE HYDROCHLORIDE 5 MG: 5 TABLET ORAL at 14:10

## 2021-01-01 RX ADMIN — ALBUTEROL SULFATE 4 PUFF: 90 AEROSOL, METERED RESPIRATORY (INHALATION) at 04:57

## 2021-01-01 RX ADMIN — Medication 2000 UNITS: at 08:42

## 2021-01-01 RX ADMIN — LORAZEPAM 0.5 MG: 2 INJECTION INTRAMUSCULAR; INTRAVENOUS at 13:10

## 2021-01-01 RX ADMIN — PROPOFOL 80 MCG/KG/MIN: 10 INJECTION, EMULSION INTRAVENOUS at 21:32

## 2021-01-01 RX ADMIN — CEFEPIME HYDROCHLORIDE 2000 MG: 2 INJECTION, POWDER, FOR SOLUTION INTRAVENOUS at 14:24

## 2021-01-01 RX ADMIN — BARICITINIB 4 MG: 2 TABLET, FILM COATED ORAL at 21:39

## 2021-01-01 RX ADMIN — BUSPIRONE HYDROCHLORIDE 5 MG: 5 TABLET ORAL at 07:58

## 2021-01-01 RX ADMIN — SODIUM CHLORIDE, PRESERVATIVE FREE 10 ML: 5 INJECTION INTRAVENOUS at 20:03

## 2021-01-01 RX ADMIN — MONTELUKAST 10 MG: 10 TABLET, FILM COATED ORAL at 20:42

## 2021-01-01 RX ADMIN — MIDODRINE HYDROCHLORIDE 10 MG: 5 TABLET ORAL at 07:30

## 2021-01-01 RX ADMIN — CEFEPIME HYDROCHLORIDE 2000 MG: 2 INJECTION, POWDER, FOR SOLUTION INTRAVENOUS at 15:17

## 2021-01-01 RX ADMIN — Medication 200 MCG/HR: at 19:11

## 2021-01-01 RX ADMIN — FAMOTIDINE 20 MG: 10 INJECTION, SOLUTION INTRAVENOUS at 21:13

## 2021-01-01 RX ADMIN — GUAIFENESIN AND DEXTROMETHORPHAN 5 ML: 100; 10 SYRUP ORAL at 17:44

## 2021-01-01 RX ADMIN — BUDESONIDE AND FORMOTEROL FUMARATE DIHYDRATE 2 PUFF: 160; 4.5 AEROSOL RESPIRATORY (INHALATION) at 11:02

## 2021-01-01 RX ADMIN — Medication 200 MCG/HR: at 01:05

## 2021-01-01 RX ADMIN — BARICITINIB 4 MG: 2 TABLET, FILM COATED ORAL at 21:33

## 2021-01-01 RX ADMIN — MIDAZOLAM HYDROCHLORIDE 9 MG/HR: 5 INJECTION INTRAMUSCULAR; INTRAVENOUS at 00:56

## 2021-01-01 RX ADMIN — MIDODRINE HYDROCHLORIDE 5 MG: 5 TABLET ORAL at 14:20

## 2021-01-01 RX ADMIN — MIDODRINE HYDROCHLORIDE 5 MG: 5 TABLET ORAL at 21:14

## 2021-01-01 RX ADMIN — SODIUM CHLORIDE, PRESERVATIVE FREE 10 ML: 5 INJECTION INTRAVENOUS at 21:18

## 2021-01-01 RX ADMIN — LORAZEPAM 0.5 MG: 2 INJECTION INTRAMUSCULAR; INTRAVENOUS at 05:51

## 2021-01-01 RX ADMIN — DEXAMETHASONE SODIUM PHOSPHATE 10 MG: 10 INJECTION, SOLUTION INTRAMUSCULAR; INTRAVENOUS at 17:55

## 2021-01-01 RX ADMIN — DEXAMETHASONE SODIUM PHOSPHATE 10 MG: 10 INJECTION, SOLUTION INTRAMUSCULAR; INTRAVENOUS at 12:09

## 2021-01-01 RX ADMIN — PROPOFOL 50 MCG/KG/MIN: 10 INJECTION, EMULSION INTRAVENOUS at 14:18

## 2021-01-01 RX ADMIN — LEVOTHYROXINE SODIUM 125 MCG: 0.03 TABLET ORAL at 05:17

## 2021-01-01 RX ADMIN — CALCIUM CARBONATE 500 MG: 500 TABLET, CHEWABLE ORAL at 08:23

## 2021-01-01 RX ADMIN — BUSPIRONE HYDROCHLORIDE 5 MG: 5 TABLET ORAL at 21:22

## 2021-01-01 RX ADMIN — BARICITINIB 4 MG: 2 TABLET, FILM COATED ORAL at 21:21

## 2021-01-01 RX ADMIN — DEXAMETHASONE SODIUM PHOSPHATE 10 MG: 10 INJECTION, SOLUTION INTRAMUSCULAR; INTRAVENOUS at 09:21

## 2021-01-01 RX ADMIN — CALCIUM CARBONATE 500 MG: 500 TABLET, CHEWABLE ORAL at 07:30

## 2021-01-01 RX ADMIN — CHLORHEXIDINE GLUCONATE 0.12% ORAL RINSE 15 ML: 1.2 LIQUID ORAL at 07:59

## 2021-01-01 RX ADMIN — PROPOFOL 70 MCG/KG/MIN: 10 INJECTION, EMULSION INTRAVENOUS at 07:13

## 2021-01-01 RX ADMIN — CEFEPIME HYDROCHLORIDE 2000 MG: 2 INJECTION, POWDER, FOR SOLUTION INTRAVENOUS at 22:34

## 2021-01-01 RX ADMIN — LEVOTHYROXINE SODIUM 125 MCG: 0.03 TABLET ORAL at 06:12

## 2021-01-01 RX ADMIN — Medication 2 PUFF: at 20:10

## 2021-01-01 RX ADMIN — LEVOTHYROXINE SODIUM 125 MCG: 0.03 TABLET ORAL at 09:54

## 2021-01-01 RX ADMIN — SODIUM CHLORIDE, PRESERVATIVE FREE 10 ML: 5 INJECTION INTRAVENOUS at 21:11

## 2021-01-01 RX ADMIN — BUSPIRONE HYDROCHLORIDE 5 MG: 5 TABLET ORAL at 21:33

## 2021-01-01 RX ADMIN — Medication 2000 UNITS: at 08:32

## 2021-01-01 RX ADMIN — Medication 100 MG: at 14:45

## 2021-01-01 RX ADMIN — BUDESONIDE AND FORMOTEROL FUMARATE DIHYDRATE 2 PUFF: 160; 4.5 AEROSOL RESPIRATORY (INHALATION) at 07:27

## 2021-01-01 RX ADMIN — ACETAMINOPHEN 650 MG: 325 TABLET ORAL at 20:48

## 2021-01-01 RX ADMIN — BUSPIRONE HYDROCHLORIDE 5 MG: 5 TABLET ORAL at 09:55

## 2021-01-01 RX ADMIN — LEVOTHYROXINE SODIUM 125 MCG: 0.03 TABLET ORAL at 07:52

## 2021-01-01 RX ADMIN — MIDAZOLAM HYDROCHLORIDE 5 MG/HR: 5 INJECTION INTRAMUSCULAR; INTRAVENOUS at 06:53

## 2021-01-01 RX ADMIN — PROPOFOL 70 MCG/KG/MIN: 10 INJECTION, EMULSION INTRAVENOUS at 19:47

## 2021-01-01 RX ADMIN — PROPOFOL 65 MCG/KG/MIN: 10 INJECTION, EMULSION INTRAVENOUS at 12:16

## 2021-01-01 RX ADMIN — MIDAZOLAM HYDROCHLORIDE 6 MG/HR: 5 INJECTION INTRAMUSCULAR; INTRAVENOUS at 17:01

## 2021-01-01 RX ADMIN — DEXAMETHASONE SODIUM PHOSPHATE 10 MG: 10 INJECTION, SOLUTION INTRAMUSCULAR; INTRAVENOUS at 17:54

## 2021-01-01 RX ADMIN — ENOXAPARIN SODIUM 70 MG: 100 INJECTION SUBCUTANEOUS at 08:15

## 2021-01-01 RX ADMIN — ENOXAPARIN SODIUM 30 MG: 100 INJECTION SUBCUTANEOUS at 20:42

## 2021-01-01 RX ADMIN — SODIUM CHLORIDE, PRESERVATIVE FREE 10 ML: 5 INJECTION INTRAVENOUS at 09:56

## 2021-01-01 RX ADMIN — ALBUTEROL SULFATE 4 PUFF: 90 AEROSOL, METERED RESPIRATORY (INHALATION) at 04:08

## 2021-01-01 RX ADMIN — SODIUM CHLORIDE, PRESERVATIVE FREE 10 ML: 5 INJECTION INTRAVENOUS at 09:16

## 2021-01-01 RX ADMIN — PROPOFOL 60 MCG/KG/MIN: 10 INJECTION, EMULSION INTRAVENOUS at 09:00

## 2021-01-01 RX ADMIN — ACETAMINOPHEN 650 MG: 325 TABLET ORAL at 10:57

## 2021-01-01 RX ADMIN — CALCIUM CARBONATE 500 MG: 500 TABLET, CHEWABLE ORAL at 09:16

## 2021-01-01 RX ADMIN — EPINEPHRINE 1 MG: 0.1 INJECTION, SOLUTION ENDOTRACHEAL; INTRACARDIAC; INTRAVENOUS at 02:40

## 2021-01-01 RX ADMIN — PROPOFOL 70 MCG/KG/MIN: 10 INJECTION, EMULSION INTRAVENOUS at 05:04

## 2021-01-01 RX ADMIN — ENOXAPARIN SODIUM 70 MG: 100 INJECTION SUBCUTANEOUS at 20:44

## 2021-01-01 RX ADMIN — SODIUM CHLORIDE, PRESERVATIVE FREE 10 ML: 5 INJECTION INTRAVENOUS at 20:54

## 2021-01-01 RX ADMIN — TRAZODONE HYDROCHLORIDE 50 MG: 50 TABLET ORAL at 20:56

## 2021-01-01 RX ADMIN — PROPOFOL 80 MCG/KG/MIN: 10 INJECTION, EMULSION INTRAVENOUS at 09:23

## 2021-01-01 RX ADMIN — PROPOFOL 50 MCG/KG/MIN: 10 INJECTION, EMULSION INTRAVENOUS at 19:13

## 2021-01-01 RX ADMIN — CALCIUM CARBONATE 500 MG: 500 TABLET, CHEWABLE ORAL at 09:51

## 2021-01-01 RX ADMIN — ALBUTEROL SULFATE 4 PUFF: 90 AEROSOL, METERED RESPIRATORY (INHALATION) at 16:58

## 2021-01-01 RX ADMIN — PROPOFOL 10 MCG/KG/MIN: 10 INJECTION, EMULSION INTRAVENOUS at 14:50

## 2021-01-01 RX ADMIN — ALBUTEROL SULFATE 4 PUFF: 90 AEROSOL, METERED RESPIRATORY (INHALATION) at 08:25

## 2021-01-01 RX ADMIN — CALCIUM CARBONATE 500 MG: 500 TABLET, CHEWABLE ORAL at 09:55

## 2021-01-01 RX ADMIN — CEFEPIME HYDROCHLORIDE 2000 MG: 2 INJECTION, POWDER, FOR SOLUTION INTRAVENOUS at 00:15

## 2021-01-01 RX ADMIN — CEFEPIME HYDROCHLORIDE 2000 MG: 2 INJECTION, POWDER, FOR SOLUTION INTRAVENOUS at 14:17

## 2021-01-01 RX ADMIN — Medication 175 MCG/HR: at 22:23

## 2021-01-01 RX ADMIN — TRAZODONE HYDROCHLORIDE 50 MG: 50 TABLET ORAL at 21:22

## 2021-01-01 RX ADMIN — ENOXAPARIN SODIUM 70 MG: 100 INJECTION SUBCUTANEOUS at 21:11

## 2021-01-01 RX ADMIN — CHLORHEXIDINE GLUCONATE 0.12% ORAL RINSE 15 ML: 1.2 LIQUID ORAL at 07:31

## 2021-01-01 RX ADMIN — MIDAZOLAM HYDROCHLORIDE 9 MG/HR: 5 INJECTION INTRAMUSCULAR; INTRAVENOUS at 22:45

## 2021-01-01 RX ADMIN — MIDODRINE HYDROCHLORIDE 5 MG: 5 TABLET ORAL at 15:17

## 2021-01-01 RX ADMIN — ALBUTEROL SULFATE 4 PUFF: 90 AEROSOL, METERED RESPIRATORY (INHALATION) at 21:06

## 2021-01-01 RX ADMIN — Medication 200 MCG/HR: at 07:16

## 2021-01-01 RX ADMIN — Medication 200 MCG/HR: at 18:18

## 2021-01-01 RX ADMIN — MONTELUKAST 10 MG: 10 TABLET, FILM COATED ORAL at 20:54

## 2021-01-01 RX ADMIN — CALCIUM CARBONATE 500 MG: 500 TABLET, CHEWABLE ORAL at 07:59

## 2021-01-01 RX ADMIN — ALBUTEROL SULFATE 4 PUFF: 90 AEROSOL, METERED RESPIRATORY (INHALATION) at 13:31

## 2021-01-01 RX ADMIN — DEXTROSE MONOHYDRATE 15 MG/HR: 50 INJECTION, SOLUTION INTRAVENOUS at 23:44

## 2021-01-01 RX ADMIN — PROPOFOL 70 MCG/KG/MIN: 10 INJECTION, EMULSION INTRAVENOUS at 07:33

## 2021-01-01 RX ADMIN — ENOXAPARIN SODIUM 70 MG: 100 INJECTION SUBCUTANEOUS at 21:31

## 2021-01-01 RX ADMIN — BUSPIRONE HYDROCHLORIDE 5 MG: 5 TABLET ORAL at 21:39

## 2021-01-01 RX ADMIN — ENOXAPARIN SODIUM 70 MG: 100 INJECTION SUBCUTANEOUS at 08:46

## 2021-01-01 RX ADMIN — ALBUTEROL SULFATE 2 PUFF: 90 AEROSOL, METERED RESPIRATORY (INHALATION) at 19:15

## 2021-01-01 RX ADMIN — ENOXAPARIN SODIUM 30 MG: 100 INJECTION SUBCUTANEOUS at 09:16

## 2021-01-01 RX ADMIN — Medication 125 MCG/HR: at 11:37

## 2021-01-01 RX ADMIN — CHLORHEXIDINE GLUCONATE 0.12% ORAL RINSE 15 ML: 1.2 LIQUID ORAL at 21:31

## 2021-01-01 RX ADMIN — BUSPIRONE HYDROCHLORIDE 5 MG: 5 TABLET ORAL at 21:02

## 2021-01-01 RX ADMIN — EPINEPHRINE 5 MCG/MIN: 1 INJECTION INTRAMUSCULAR; INTRAVENOUS; SUBCUTANEOUS at 02:11

## 2021-01-01 RX ADMIN — LEVOTHYROXINE SODIUM 125 MCG: 0.03 TABLET ORAL at 07:05

## 2021-01-01 RX ADMIN — PROPOFOL 70 MCG/KG/MIN: 10 INJECTION, EMULSION INTRAVENOUS at 18:27

## 2021-01-01 RX ADMIN — SODIUM CHLORIDE, PRESERVATIVE FREE 10 ML: 5 INJECTION INTRAVENOUS at 21:34

## 2021-01-01 RX ADMIN — VECURONIUM BROMIDE 0.5 MCG/KG/MIN: 1 INJECTION, POWDER, LYOPHILIZED, FOR SOLUTION INTRAVENOUS at 17:05

## 2021-01-01 RX ADMIN — CALCIUM CARBONATE 500 MG: 500 TABLET, CHEWABLE ORAL at 08:36

## 2021-01-01 RX ADMIN — BARICITINIB 4 MG: 2 TABLET, FILM COATED ORAL at 21:12

## 2021-01-01 RX ADMIN — DILTIAZEM HYDROCHLORIDE 120 MG: 120 CAPSULE, COATED, EXTENDED RELEASE ORAL at 11:13

## 2021-01-01 RX ADMIN — Medication 2 PUFF: at 19:15

## 2021-01-01 RX ADMIN — VANCOMYCIN HYDROCHLORIDE 1250 MG: 5 INJECTION, POWDER, LYOPHILIZED, FOR SOLUTION INTRAVENOUS at 21:47

## 2021-01-01 RX ADMIN — PROPOFOL 70 MCG/KG/MIN: 10 INJECTION, EMULSION INTRAVENOUS at 21:32

## 2021-01-01 RX ADMIN — FAMOTIDINE 20 MG: 10 INJECTION, SOLUTION INTRAVENOUS at 08:46

## 2021-01-01 RX ADMIN — ENOXAPARIN SODIUM 70 MG: 100 INJECTION SUBCUTANEOUS at 08:33

## 2021-01-01 RX ADMIN — ACETAMINOPHEN 650 MG: 325 TABLET ORAL at 17:52

## 2021-01-01 RX ADMIN — PROPOFOL 70 MCG/KG/MIN: 10 INJECTION, EMULSION INTRAVENOUS at 19:25

## 2021-01-01 RX ADMIN — PROPOFOL 65 MCG/KG/MIN: 10 INJECTION, EMULSION INTRAVENOUS at 21:00

## 2021-01-01 RX ADMIN — HYDROMORPHONE HYDROCHLORIDE 0.5 MG: 2 INJECTION INTRAMUSCULAR; INTRAVENOUS; SUBCUTANEOUS at 13:09

## 2021-01-01 RX ADMIN — CEFEPIME HYDROCHLORIDE 2000 MG: 2 INJECTION, POWDER, FOR SOLUTION INTRAVENOUS at 12:14

## 2021-01-01 RX ADMIN — ALBUTEROL SULFATE 4 PUFF: 90 AEROSOL, METERED RESPIRATORY (INHALATION) at 00:45

## 2021-01-01 RX ADMIN — ALBUTEROL SULFATE 2 PUFF: 90 AEROSOL, METERED RESPIRATORY (INHALATION) at 20:41

## 2021-01-01 RX ADMIN — MIDODRINE HYDROCHLORIDE 5 MG: 5 TABLET ORAL at 09:16

## 2021-01-01 RX ADMIN — GUAIFENESIN AND DEXTROMETHORPHAN 5 ML: 100; 10 SYRUP ORAL at 17:53

## 2021-01-01 RX ADMIN — ALBUTEROL SULFATE 4 PUFF: 90 AEROSOL, METERED RESPIRATORY (INHALATION) at 15:55

## 2021-01-01 RX ADMIN — DEXAMETHASONE SODIUM PHOSPHATE 10 MG: 10 INJECTION, SOLUTION INTRAMUSCULAR; INTRAVENOUS at 09:27

## 2021-01-01 RX ADMIN — DEXTROSE MONOHYDRATE 10 MG/HR: 50 INJECTION, SOLUTION INTRAVENOUS at 18:19

## 2021-01-01 RX ADMIN — MIDODRINE HYDROCHLORIDE 10 MG: 5 TABLET ORAL at 20:45

## 2021-01-01 RX ADMIN — FAMOTIDINE 20 MG: 10 INJECTION, SOLUTION INTRAVENOUS at 08:28

## 2021-01-01 RX ADMIN — VECURONIUM BROMIDE 0.8 MCG/KG/MIN: 1 INJECTION, POWDER, LYOPHILIZED, FOR SOLUTION INTRAVENOUS at 20:58

## 2021-01-01 RX ADMIN — PROPOFOL 70 MCG/KG/MIN: 10 INJECTION, EMULSION INTRAVENOUS at 22:05

## 2021-01-01 RX ADMIN — ALBUTEROL SULFATE 4 PUFF: 90 AEROSOL, METERED RESPIRATORY (INHALATION) at 03:15

## 2021-01-01 RX ADMIN — ALBUTEROL SULFATE 2 PUFF: 90 AEROSOL, METERED RESPIRATORY (INHALATION) at 07:26

## 2021-01-01 RX ADMIN — CHLORHEXIDINE GLUCONATE 0.12% ORAL RINSE 15 ML: 1.2 LIQUID ORAL at 08:33

## 2021-01-01 RX ADMIN — SODIUM CHLORIDE, PRESERVATIVE FREE 10 ML: 5 INJECTION INTRAVENOUS at 08:44

## 2021-01-01 RX ADMIN — BUDESONIDE AND FORMOTEROL FUMARATE DIHYDRATE 2 PUFF: 160; 4.5 AEROSOL RESPIRATORY (INHALATION) at 21:28

## 2021-01-01 RX ADMIN — DILTIAZEM HYDROCHLORIDE 30 MG: 30 TABLET, FILM COATED ORAL at 01:18

## 2021-01-01 RX ADMIN — ALBUTEROL SULFATE 4 PUFF: 90 AEROSOL, METERED RESPIRATORY (INHALATION) at 08:44

## 2021-01-01 RX ADMIN — Medication 100 MCG/MIN: at 08:18

## 2021-01-01 RX ADMIN — PROPOFOL 70 MCG/KG/MIN: 10 INJECTION, EMULSION INTRAVENOUS at 00:57

## 2021-01-01 RX ADMIN — MIDODRINE HYDROCHLORIDE 5 MG: 5 TABLET ORAL at 23:15

## 2021-01-01 RX ADMIN — Medication 175 MCG/HR: at 04:54

## 2021-01-01 RX ADMIN — ALBUTEROL SULFATE 4 PUFF: 90 AEROSOL, METERED RESPIRATORY (INHALATION) at 15:50

## 2021-01-01 RX ADMIN — LEVOTHYROXINE SODIUM 125 MCG: 0.03 TABLET ORAL at 06:07

## 2021-01-01 RX ADMIN — LEVOTHYROXINE SODIUM 125 MCG: 0.03 TABLET ORAL at 05:56

## 2021-01-01 RX ADMIN — PROPOFOL 50 MCG/KG/MIN: 10 INJECTION, EMULSION INTRAVENOUS at 05:30

## 2021-01-01 RX ADMIN — BUSPIRONE HYDROCHLORIDE 5 MG: 5 TABLET ORAL at 21:12

## 2021-01-01 RX ADMIN — SALINE NASAL SPRAY 1 SPRAY: 1.5 SOLUTION NASAL at 11:59

## 2021-01-01 RX ADMIN — ENOXAPARIN SODIUM 30 MG: 100 INJECTION SUBCUTANEOUS at 10:07

## 2021-01-01 RX ADMIN — SODIUM CHLORIDE 25 ML: 9 INJECTION, SOLUTION INTRAVENOUS at 00:22

## 2021-01-01 RX ADMIN — CHLORHEXIDINE GLUCONATE 0.12% ORAL RINSE 15 ML: 1.2 LIQUID ORAL at 22:09

## 2021-01-01 RX ADMIN — CHLORHEXIDINE GLUCONATE 0.12% ORAL RINSE 15 ML: 1.2 LIQUID ORAL at 08:45

## 2021-01-01 RX ADMIN — ENOXAPARIN SODIUM 70 MG: 100 INJECTION SUBCUTANEOUS at 23:15

## 2021-01-01 RX ADMIN — ALBUTEROL SULFATE 2 PUFF: 90 AEROSOL, METERED RESPIRATORY (INHALATION) at 08:49

## 2021-01-01 RX ADMIN — BUDESONIDE AND FORMOTEROL FUMARATE DIHYDRATE 2 PUFF: 160; 4.5 AEROSOL RESPIRATORY (INHALATION) at 07:29

## 2021-01-01 RX ADMIN — BUSPIRONE HYDROCHLORIDE 5 MG: 5 TABLET ORAL at 08:31

## 2021-01-01 RX ADMIN — BUSPIRONE HYDROCHLORIDE 5 MG: 5 TABLET ORAL at 20:02

## 2021-01-01 RX ADMIN — ALBUTEROL SULFATE 4 PUFF: 90 AEROSOL, METERED RESPIRATORY (INHALATION) at 01:28

## 2021-01-01 RX ADMIN — CEFEPIME HYDROCHLORIDE 2000 MG: 2 INJECTION, POWDER, FOR SOLUTION INTRAVENOUS at 05:55

## 2021-01-01 RX ADMIN — DEXAMETHASONE SODIUM PHOSPHATE 10 MG: 10 INJECTION, SOLUTION INTRAMUSCULAR; INTRAVENOUS at 10:31

## 2021-01-01 RX ADMIN — FUROSEMIDE 5 MG/HR: 10 INJECTION, SOLUTION INTRAVENOUS at 16:58

## 2021-01-01 RX ADMIN — ALBUTEROL SULFATE 4 PUFF: 90 AEROSOL, METERED RESPIRATORY (INHALATION) at 10:07

## 2021-01-01 RX ADMIN — Medication 2 PUFF: at 09:28

## 2021-01-01 RX ADMIN — ACETAMINOPHEN 650 MG: 325 TABLET ORAL at 10:43

## 2021-01-01 RX ADMIN — ALBUTEROL SULFATE 4 PUFF: 90 AEROSOL, METERED RESPIRATORY (INHALATION) at 11:54

## 2021-01-01 RX ADMIN — ENOXAPARIN SODIUM 30 MG: 100 INJECTION SUBCUTANEOUS at 09:53

## 2021-01-01 RX ADMIN — PROPOFOL 55 MCG/KG/MIN: 10 INJECTION, EMULSION INTRAVENOUS at 13:20

## 2021-01-01 RX ADMIN — PROPOFOL 80 MCG/KG/MIN: 10 INJECTION, EMULSION INTRAVENOUS at 15:19

## 2021-01-01 RX ADMIN — PROPOFOL 70 MCG/KG/MIN: 10 INJECTION, EMULSION INTRAVENOUS at 23:28

## 2021-01-01 RX ADMIN — ENOXAPARIN SODIUM 30 MG: 100 INJECTION SUBCUTANEOUS at 20:40

## 2021-01-01 RX ADMIN — BARICITINIB 4 MG: 2 TABLET, FILM COATED ORAL at 21:31

## 2021-01-01 RX ADMIN — BUDESONIDE AND FORMOTEROL FUMARATE DIHYDRATE 2 PUFF: 160; 4.5 AEROSOL RESPIRATORY (INHALATION) at 20:41

## 2021-01-01 RX ADMIN — PROPOFOL 70 MCG/KG/MIN: 10 INJECTION, EMULSION INTRAVENOUS at 14:46

## 2021-01-01 RX ADMIN — CALCIUM CARBONATE 500 MG: 500 TABLET, CHEWABLE ORAL at 09:17

## 2021-01-01 RX ADMIN — GUAIFENESIN AND DEXTROMETHORPHAN 5 ML: 100; 10 SYRUP ORAL at 12:25

## 2021-01-01 RX ADMIN — MONTELUKAST 10 MG: 10 TABLET, FILM COATED ORAL at 21:18

## 2021-01-01 RX ADMIN — Medication 2000 UNITS: at 12:16

## 2021-01-01 RX ADMIN — SODIUM CHLORIDE 25 ML: 9 INJECTION, SOLUTION INTRAVENOUS at 21:58

## 2021-01-01 RX ADMIN — SODIUM CHLORIDE, PRESERVATIVE FREE 10 ML: 5 INJECTION INTRAVENOUS at 20:47

## 2021-01-01 RX ADMIN — MIDAZOLAM HYDROCHLORIDE 1 MG/HR: 5 INJECTION INTRAMUSCULAR; INTRAVENOUS at 13:41

## 2021-01-01 RX ADMIN — SODIUM CHLORIDE, PRESERVATIVE FREE 10 ML: 5 INJECTION INTRAVENOUS at 11:24

## 2021-01-01 RX ADMIN — PROPOFOL 70 MCG/KG/MIN: 10 INJECTION, EMULSION INTRAVENOUS at 11:32

## 2021-01-01 RX ADMIN — ENOXAPARIN SODIUM 30 MG: 100 INJECTION SUBCUTANEOUS at 20:33

## 2021-01-01 RX ADMIN — CEFEPIME HYDROCHLORIDE 2000 MG: 2 INJECTION, POWDER, FOR SOLUTION INTRAVENOUS at 21:47

## 2021-01-01 RX ADMIN — GUAIFENESIN AND DEXTROMETHORPHAN 5 ML: 100; 10 SYRUP ORAL at 00:19

## 2021-01-01 RX ADMIN — ALBUTEROL SULFATE 4 PUFF: 90 AEROSOL, METERED RESPIRATORY (INHALATION) at 03:33

## 2021-01-01 RX ADMIN — MIDODRINE HYDROCHLORIDE 5 MG: 5 TABLET ORAL at 14:51

## 2021-01-01 RX ADMIN — ACETAMINOPHEN 650 MG: 650 SUPPOSITORY RECTAL at 09:29

## 2021-01-01 RX ADMIN — HYDROXYZINE PAMOATE 25 MG: 25 CAPSULE ORAL at 06:16

## 2021-01-01 RX ADMIN — DEXTROSE MONOHYDRATE 5 MG/HR: 50 INJECTION, SOLUTION INTRAVENOUS at 06:27

## 2021-01-01 RX ADMIN — ENOXAPARIN SODIUM 30 MG: 100 INJECTION SUBCUTANEOUS at 00:57

## 2021-01-01 RX ADMIN — PROPOFOL 70 MCG/KG/MIN: 10 INJECTION, EMULSION INTRAVENOUS at 09:17

## 2021-01-01 RX ADMIN — PROPOFOL 70 MCG/KG/MIN: 10 INJECTION, EMULSION INTRAVENOUS at 08:40

## 2021-01-01 RX ADMIN — HYDROXYZINE PAMOATE 25 MG: 25 CAPSULE ORAL at 00:56

## 2021-01-01 RX ADMIN — MIDODRINE HYDROCHLORIDE 10 MG: 5 TABLET ORAL at 08:32

## 2021-01-01 RX ADMIN — ALBUTEROL SULFATE 4 PUFF: 90 AEROSOL, METERED RESPIRATORY (INHALATION) at 23:12

## 2021-01-01 RX ADMIN — BUSPIRONE HYDROCHLORIDE 5 MG: 5 TABLET ORAL at 20:41

## 2021-01-01 RX ADMIN — DEXAMETHASONE SODIUM PHOSPHATE 10 MG: 10 INJECTION, SOLUTION INTRAMUSCULAR; INTRAVENOUS at 08:23

## 2021-01-01 RX ADMIN — FAMOTIDINE 20 MG: 10 INJECTION, SOLUTION INTRAVENOUS at 09:17

## 2021-01-01 RX ADMIN — MONTELUKAST 10 MG: 10 TABLET, FILM COATED ORAL at 21:31

## 2021-01-01 RX ADMIN — CHLORHEXIDINE GLUCONATE 0.12% ORAL RINSE 15 ML: 1.2 LIQUID ORAL at 07:29

## 2021-01-01 RX ADMIN — MIDAZOLAM HYDROCHLORIDE 7 MG/HR: 5 INJECTION INTRAMUSCULAR; INTRAVENOUS at 06:01

## 2021-01-01 RX ADMIN — ENOXAPARIN SODIUM 30 MG: 100 INJECTION SUBCUTANEOUS at 11:14

## 2021-01-01 RX ADMIN — Medication 175 MCG/HR: at 07:31

## 2021-01-01 RX ADMIN — MIDAZOLAM HYDROCHLORIDE 7 MG/HR: 5 INJECTION INTRAMUSCULAR; INTRAVENOUS at 18:13

## 2021-01-01 RX ADMIN — CALCIUM CARBONATE 500 MG: 500 TABLET, CHEWABLE ORAL at 08:43

## 2021-01-01 RX ADMIN — ALBUTEROL SULFATE 4 PUFF: 90 AEROSOL, METERED RESPIRATORY (INHALATION) at 13:12

## 2021-01-01 RX ADMIN — PROPOFOL 70 MCG/KG/MIN: 10 INJECTION, EMULSION INTRAVENOUS at 02:58

## 2021-01-01 RX ADMIN — DEXTROSE MONOHYDRATE 5 MG/HR: 50 INJECTION, SOLUTION INTRAVENOUS at 05:21

## 2021-01-01 RX ADMIN — SODIUM CHLORIDE, PRESERVATIVE FREE 10 ML: 5 INJECTION INTRAVENOUS at 08:33

## 2021-01-01 RX ADMIN — ENOXAPARIN SODIUM 70 MG: 100 INJECTION SUBCUTANEOUS at 21:41

## 2021-01-01 RX ADMIN — BUDESONIDE AND FORMOTEROL FUMARATE DIHYDRATE 2 PUFF: 160; 4.5 AEROSOL RESPIRATORY (INHALATION) at 20:39

## 2021-01-01 RX ADMIN — ENOXAPARIN SODIUM 70 MG: 100 INJECTION SUBCUTANEOUS at 08:28

## 2021-01-01 RX ADMIN — BUDESONIDE AND FORMOTEROL FUMARATE DIHYDRATE 2 PUFF: 160; 4.5 AEROSOL RESPIRATORY (INHALATION) at 08:03

## 2021-01-01 RX ADMIN — ENOXAPARIN SODIUM 70 MG: 100 INJECTION SUBCUTANEOUS at 21:18

## 2021-01-01 RX ADMIN — BUSPIRONE HYDROCHLORIDE 5 MG: 5 TABLET ORAL at 09:17

## 2021-01-01 RX ADMIN — ACETAMINOPHEN 650 MG: 650 SUPPOSITORY RECTAL at 01:59

## 2021-01-01 RX ADMIN — Medication 2000 UNITS: at 09:15

## 2021-01-01 RX ADMIN — LEVOTHYROXINE SODIUM 125 MCG: 0.03 TABLET ORAL at 09:15

## 2021-01-01 RX ADMIN — PROPOFOL 70 MCG/KG/MIN: 10 INJECTION, EMULSION INTRAVENOUS at 07:30

## 2021-01-01 RX ADMIN — PROPOFOL 70 MCG/KG/MIN: 10 INJECTION, EMULSION INTRAVENOUS at 04:51

## 2021-01-01 RX ADMIN — SODIUM CHLORIDE 999 ML/HR: 9 INJECTION, SOLUTION INTRAVENOUS at 01:50

## 2021-01-01 RX ADMIN — BARICITINIB 4 MG: 2 TABLET, FILM COATED ORAL at 21:02

## 2021-01-01 RX ADMIN — PROPOFOL 70 MCG/KG/MIN: 10 INJECTION, EMULSION INTRAVENOUS at 14:29

## 2021-01-01 RX ADMIN — PROPOFOL 75 MCG/KG/MIN: 10 INJECTION, EMULSION INTRAVENOUS at 12:42

## 2021-01-01 RX ADMIN — CEFEPIME HYDROCHLORIDE 2000 MG: 2 INJECTION, POWDER, FOR SOLUTION INTRAVENOUS at 13:10

## 2021-01-01 RX ADMIN — Medication 175 MCG/HR: at 15:58

## 2021-01-01 RX ADMIN — PROPOFOL 65 MCG/KG/MIN: 10 INJECTION, EMULSION INTRAVENOUS at 16:17

## 2021-01-01 RX ADMIN — CHLORHEXIDINE GLUCONATE 0.12% ORAL RINSE 15 ML: 1.2 LIQUID ORAL at 09:17

## 2021-01-01 RX ADMIN — VANCOMYCIN HYDROCHLORIDE 1250 MG: 5 INJECTION, POWDER, LYOPHILIZED, FOR SOLUTION INTRAVENOUS at 21:04

## 2021-01-01 RX ADMIN — VANCOMYCIN HYDROCHLORIDE 1250 MG: 5 INJECTION, POWDER, LYOPHILIZED, FOR SOLUTION INTRAVENOUS at 12:30

## 2021-01-01 RX ADMIN — SODIUM CHLORIDE, PRESERVATIVE FREE 10 ML: 5 INJECTION INTRAVENOUS at 08:29

## 2021-01-01 RX ADMIN — METOPROLOL TARTRATE 2.5 MG: 5 INJECTION INTRAVENOUS at 12:17

## 2021-01-01 RX ADMIN — FAMOTIDINE 20 MG: 10 INJECTION, SOLUTION INTRAVENOUS at 07:30

## 2021-01-01 RX ADMIN — VECURONIUM BROMIDE 0.5 MCG/KG/MIN: 1 INJECTION, POWDER, LYOPHILIZED, FOR SOLUTION INTRAVENOUS at 17:18

## 2021-01-01 RX ADMIN — CALCIUM CARBONATE 500 MG: 500 TABLET, CHEWABLE ORAL at 10:35

## 2021-01-01 RX ADMIN — DEXAMETHASONE SODIUM PHOSPHATE 10 MG: 10 INJECTION, SOLUTION INTRAMUSCULAR; INTRAVENOUS at 16:41

## 2021-01-01 RX ADMIN — MIDODRINE HYDROCHLORIDE 5 MG: 5 TABLET ORAL at 15:43

## 2021-01-01 RX ADMIN — EPINEPHRINE 1 MG: 0.1 INJECTION, SOLUTION ENDOTRACHEAL; INTRACARDIAC; INTRAVENOUS at 02:44

## 2021-01-01 RX ADMIN — ALBUTEROL SULFATE 4 PUFF: 90 AEROSOL, METERED RESPIRATORY (INHALATION) at 19:56

## 2021-01-01 RX ADMIN — ENOXAPARIN SODIUM 70 MG: 100 INJECTION SUBCUTANEOUS at 07:59

## 2021-01-01 RX ADMIN — Medication 125 MCG/HR: at 12:57

## 2021-01-01 RX ADMIN — CHLORHEXIDINE GLUCONATE 0.12% ORAL RINSE 15 ML: 1.2 LIQUID ORAL at 21:33

## 2021-01-01 RX ADMIN — FAMOTIDINE 20 MG: 10 INJECTION, SOLUTION INTRAVENOUS at 09:05

## 2021-01-01 RX ADMIN — CEFEPIME HYDROCHLORIDE 2000 MG: 2 INJECTION, POWDER, FOR SOLUTION INTRAVENOUS at 13:47

## 2021-01-01 RX ADMIN — Medication 150 MCG/HR: at 03:59

## 2021-01-01 RX ADMIN — GUAIFENESIN AND DEXTROMETHORPHAN 5 ML: 100; 10 SYRUP ORAL at 06:38

## 2021-01-01 RX ADMIN — MIDAZOLAM HYDROCHLORIDE 10 MG/HR: 5 INJECTION INTRAMUSCULAR; INTRAVENOUS at 06:21

## 2021-01-01 RX ADMIN — ALBUTEROL SULFATE 4 PUFF: 90 AEROSOL, METERED RESPIRATORY (INHALATION) at 08:53

## 2021-01-01 RX ADMIN — SODIUM CHLORIDE, PRESERVATIVE FREE 10 ML: 5 INJECTION INTRAVENOUS at 20:35

## 2021-01-01 RX ADMIN — ALBUTEROL SULFATE 2 PUFF: 90 AEROSOL, METERED RESPIRATORY (INHALATION) at 22:30

## 2021-01-01 RX ADMIN — MIDAZOLAM HYDROCHLORIDE 6 MG/HR: 5 INJECTION INTRAMUSCULAR; INTRAVENOUS at 00:34

## 2021-01-01 RX ADMIN — GUAIFENESIN AND DEXTROMETHORPHAN 5 ML: 100; 10 SYRUP ORAL at 00:53

## 2021-01-01 RX ADMIN — PROPOFOL 70 MCG/KG/MIN: 10 INJECTION, EMULSION INTRAVENOUS at 04:54

## 2021-01-01 RX ADMIN — ENOXAPARIN SODIUM 70 MG: 100 INJECTION SUBCUTANEOUS at 21:02

## 2021-01-01 RX ADMIN — LEVOTHYROXINE SODIUM 125 MCG: 0.03 TABLET ORAL at 05:33

## 2021-01-01 RX ADMIN — CHLORHEXIDINE GLUCONATE 0.12% ORAL RINSE 15 ML: 1.2 LIQUID ORAL at 23:14

## 2021-01-01 RX ADMIN — SODIUM CHLORIDE, PRESERVATIVE FREE 10 ML: 5 INJECTION INTRAVENOUS at 21:02

## 2021-01-01 RX ADMIN — BUSPIRONE HYDROCHLORIDE 5 MG: 5 TABLET ORAL at 07:29

## 2021-01-01 RX ADMIN — ALBUTEROL SULFATE 4 PUFF: 90 AEROSOL, METERED RESPIRATORY (INHALATION) at 04:05

## 2021-01-01 ASSESSMENT — PAIN SCALES - GENERAL
PAINLEVEL_OUTOF10: 0
PAINLEVEL_OUTOF10: 6
PAINLEVEL_OUTOF10: 0
PAINLEVEL_OUTOF10: 3
PAINLEVEL_OUTOF10: 0
PAINLEVEL_OUTOF10: 0
PAINLEVEL_OUTOF10: 4
PAINLEVEL_OUTOF10: 0
PAINLEVEL_OUTOF10: 3
PAINLEVEL_OUTOF10: 0
PAINLEVEL_OUTOF10: 5
PAINLEVEL_OUTOF10: 0
PAINLEVEL_OUTOF10: 0
PAINLEVEL_OUTOF10: 2
PAINLEVEL_OUTOF10: 0
PAINLEVEL_OUTOF10: 6
PAINLEVEL_OUTOF10: 0
PAINLEVEL_OUTOF10: 3
PAINLEVEL_OUTOF10: 0
PAINLEVEL_OUTOF10: 7
PAINLEVEL_OUTOF10: 0
PAINLEVEL_OUTOF10: 2
PAINLEVEL_OUTOF10: 4
PAINLEVEL_OUTOF10: 0
PAINLEVEL_OUTOF10: 7
PAINLEVEL_OUTOF10: 0
PAINLEVEL_OUTOF10: 2
PAINLEVEL_OUTOF10: 2
PAINLEVEL_OUTOF10: 0
PAINLEVEL_OUTOF10: 2
PAINLEVEL_OUTOF10: 0
PAINLEVEL_OUTOF10: 0

## 2021-01-01 ASSESSMENT — PULMONARY FUNCTION TESTS
PIF_VALUE: 27
PIF_VALUE: 23
PIF_VALUE: 25
PIF_VALUE: 25
PIF_VALUE: 28
PIF_VALUE: 24
PIF_VALUE: 27
PIF_VALUE: 26
PIF_VALUE: 25
PIF_VALUE: 34
PIF_VALUE: 24
PIF_VALUE: 27
PIF_VALUE: 25
PIF_VALUE: 27
PIF_VALUE: 25
PIF_VALUE: 25
PIF_VALUE: 26
PIF_VALUE: 27
PIF_VALUE: 24
PIF_VALUE: 28
PIF_VALUE: 27
PIF_VALUE: 26
PIF_VALUE: 27
PIF_VALUE: 31
PIF_VALUE: 25
PIF_VALUE: 27
PIF_VALUE: 25
PIF_VALUE: 27
PIF_VALUE: 24
PIF_VALUE: 25
PIF_VALUE: 27
PIF_VALUE: 29
PIF_VALUE: 23
PIF_VALUE: 26
PIF_VALUE: 27
PIF_VALUE: 27
PIF_VALUE: 31
PIF_VALUE: 23
PIF_VALUE: 22
PIF_VALUE: 25
PIF_VALUE: 27
PIF_VALUE: 28
PIF_VALUE: 25
PIF_VALUE: 23
PIF_VALUE: 27
PIF_VALUE: 27
PIF_VALUE: 37
PIF_VALUE: 35
PIF_VALUE: 26
PIF_VALUE: 27
PIF_VALUE: 25
PIF_VALUE: 24
PIF_VALUE: 24
PIF_VALUE: 28
PIF_VALUE: 27
PIF_VALUE: 25
PIF_VALUE: 23
PIF_VALUE: 23
PIF_VALUE: 25
PIF_VALUE: 26
PIF_VALUE: 29
PIF_VALUE: 28
PIF_VALUE: 25
PIF_VALUE: 27
PIF_VALUE: 35

## 2021-01-01 ASSESSMENT — PAIN DESCRIPTION - LOCATION
LOCATION: HEAD
LOCATION: HEAD

## 2021-01-01 ASSESSMENT — ENCOUNTER SYMPTOMS
GASTROINTESTINAL NEGATIVE: 1
EYES NEGATIVE: 1
EYES NEGATIVE: 1
ALLERGIC/IMMUNOLOGIC NEGATIVE: 1
GASTROINTESTINAL NEGATIVE: 1
ALLERGIC/IMMUNOLOGIC NEGATIVE: 1
RESPIRATORY NEGATIVE: 1
RESPIRATORY NEGATIVE: 1
SHORTNESS OF BREATH: 0
RESPIRATORY NEGATIVE: 1
GASTROINTESTINAL NEGATIVE: 1
WHEEZING: 0
COUGH: 0
ALLERGIC/IMMUNOLOGIC NEGATIVE: 1
EYES NEGATIVE: 1

## 2021-01-01 ASSESSMENT — PATIENT HEALTH QUESTIONNAIRE - PHQ9
SUM OF ALL RESPONSES TO PHQ9 QUESTIONS 1 & 2: 0
SUM OF ALL RESPONSES TO PHQ QUESTIONS 1-9: 0
1. LITTLE INTEREST OR PLEASURE IN DOING THINGS: 0
2. FEELING DOWN, DEPRESSED OR HOPELESS: 0
SUM OF ALL RESPONSES TO PHQ QUESTIONS 1-9: 0
2. FEELING DOWN, DEPRESSED OR HOPELESS: 0

## 2021-01-01 ASSESSMENT — PAIN SCALES - WONG BAKER
WONGBAKER_NUMERICALRESPONSE: 0

## 2021-01-01 ASSESSMENT — PAIN DESCRIPTION - DESCRIPTORS: DESCRIPTORS: HEADACHE

## 2021-01-01 ASSESSMENT — PAIN DESCRIPTION - PAIN TYPE: TYPE: ACUTE PAIN

## 2021-03-08 NOTE — ASSESSMENT & PLAN NOTE
Patient has history of thyroid cancer. She is on Synthroid 125 mcg daily and 100 tablets on Sundays.   Patient sees endocrinologist in [place]

## 2021-03-08 NOTE — PATIENT INSTRUCTIONS
Patient Education        Learning About Menopause  What is menopause? For most women, menopause is a natural process of aging. Menstrual periods gradually stop. The ability to become pregnant ends. Some women feel relief that they no longer have periods. But other women struggle with the physical and emotional changes that come with menopause. For most women, menopause happens around age 48. But every woman's body has its own timeline. Some women stop having periods in their mid-40s. Others keep having periods well into their 50s. And some women go through menopause early because of cancer treatment or surgery to remove the ovaries. What happens during menopause? · It starts with perimenopause. This is the process of change that leads up to menopause. Perimenopause can start as early as your late 35s or as late as your early 46s. How long it lasts varies. But it usually lasts from 2 to 8 years. · During this time, your hormone levels will go up and down unevenly (fluctuate). This causes changes in your periods and other symptoms. In time, estrogen and progesterone levels drop enough that the menstrual cycle stops. Going a full year without having a period is usually considered menopause. · Low estrogen levels after menopause speed bone loss. This increases your risk of osteoporosis. Also, your risk of heart disease increases after menopause. · It's normal to have thinner, drier skin after menopause. The vaginal lining and the lower urinary tract also thin. This can make it hard to have sex. It can also increase the risk of vaginal and urinary tract infections. What are the symptoms? · Hot flashes. · Trouble sleeping. · Vaginal dryness. Symptoms related to mood and thinking may also happen around the time of menopause. These include:  · Mood swings or feeling grouchy, depressed, or worried. · Problems with remembering or thinking clearly. Some women have only a few mild symptoms.  Others have severe symptoms that disrupt their sleep and daily lives. Menopause caused by surgery, chemotherapy, or radiation therapy can cause symptoms to be more severe. A condition you already had, such as depression, anxiety, sleep problems, or irritability, can also make symptoms worse. Symptoms tend to last or get worse the first year or more after menopause. Over time, hormones even out at low levels. Many symptoms improve or go away. But some women may have symptoms that don't go away. After menopause, you may get other symptoms. These include drying and thinning of the skin, and vaginal and urinary tract changes. How are menopause symptoms treated? If your symptoms are bothering you, there are lifestyle changes and treatments that can help. Lifestyle changes    · Choose a heart-healthy diet that is low in saturated fat. It should include plenty of fruits, vegetables, beans, and high-fiber grains and breads. Be sure you get enough calcium and vitamin D to help your bones stay strong. Low-fat or nonfat dairy products are a great source of calcium.     · Get regular exercise. Exercise can help you manage your weight, keep your heart and bones strong, and lift your mood.     · Limit caffeine, alcohol, and stress. These things may make symptoms worse. Limiting them may help you sleep better.     · If you smoke, stop. Quitting smoking can reduce hot flashes and long-term health risks. Medicines  If your symptoms bother you, talk with your doctor. You may want to try prescription medicines, such as:    · Birth control pills before menopause.     · Hormone therapy (HT).   · Antidepressants.     · A medicine called clonidine that is usually used to treat high blood pressure. All medicines for menopause symptoms have possible risks or side effects. A very small number of women develop serious health problems when taking hormone therapy.  Be sure to talk to your doctor about your possible health risks before you start a treatment for menopause symptoms. Other treatments  You can try:    · Cognitive-behavorial therapy. This may help reduce hot flashes.     · Hypnosis. This may help reduce the number and severity of hot flashes.     · Breathing exercises. They may help reduce hot flashes and emotional symptoms.     · Soy. Some women feel that eating lots of soy helps even out their menopause symptoms.     · Yoga or biofeedback. They may help reduce stress. Follow-up care is a key part of your treatment and safety. Be sure to make and go to all appointments, and call your doctor if you are having problems. It's also a good idea to know your test results and keep a list of the medicines you take. Where can you learn more? Go to https://[employer]. org and sign in to your [employer] account. Enter H199 in the eigital box to learn more about \"Learning About Menopause. \"     If you do not have an account, please click on the \"Sign Up Now\" link. Current as of: November 8, 2019               Content Version: 12.6  © 5152-8589 [employer]. Care instructions adapted under license by [facility] ([facility]). If you have questions about a medical condition or this instruction, always ask your healthcare professional. [address] any warranty or liability for your use of this information. Patient Education        Menopause Diet: Care Instructions  Your Care Instructions     Healthy eating helps ease menopause symptoms. And it can reduce your risk for getting conditions such as osteoporosis and heart disease. Follow-up care is a key part of your treatment and safety. Be sure to make and go to all appointments, and call your doctor if you are having problems. It's also a good idea to know your test results and keep a list of the medicines you take. How can you care for yourself at home? · Limit fats in your diet. · Choose foods that have a lot of calcium.  The recommended daily intake for adults ages 23 to 48 is 1,000 milligrams (mg). Adults over 50 need 1,200 mg a day. If you don't get enough calcium in the foods you eat, ask your doctor if taking a supplement is right for you. · Add vitamin D to your daily diet. It helps your body use calcium. The recommended daily intake of vitamin D is 600 international units (IU) a day for children and adults up to age 79. Adults age 70 and older need 800 IU a day. If you don't get enough vitamin D in the foods you eat, ask your doctor if taking a supplement is right for you. · Include good sources of fiber in your diet each day. These include whole grains, beans, fruits, and vegetables. · Avoid simple sugars. This helps if you have mood swings, anxiety, or depression. · Avoid caffeine, or cut back on it. Caffeine can cause sleep problems. It can also make you feel anxious. To relieve these symptoms, pay attention to how much caffeine you are getting in drinks and chocolate. · Limit your intake of alcohol. For some women, drinking may make symptoms worse. Where can you learn more? Go to https://[employer]USYS.[employer]. org and sign in to your [employer] account. Enter C146 in the [facility] box to learn more about \"Menopause Diet: Care Instructions. \"     If you do not have an account, please click on the \"Sign Up Now\" link. Current as of: August 22, 2019               Content Version: 12.6  © 8708-2762 [employer] [place]. Care instructions adapted under license by [facility] ([facility]). If you have questions about a medical condition or this instruction, always ask your healthcare professional. [place] [address] any warranty or liability for your use of this information.

## 2021-03-08 NOTE — PROGRESS NOTES
Eva Jiménez  Patient's  is 1970  Seen in office on 3/8/2021      SUBJECTIVE:  Leidy Jara jeanne 45 y. o.year old female presents today   Chief Complaint   Patient presents with    Anxiety    Hypertension    Medication Refill     Patient is here for follow-up of anxiety, hypertension, asthma and insomnia  Patient states she is feeling well and has well. Anxiety is better. buspar is helping a lot  Sleeps well now. Patient has hypertension. Taking medications No headaches, no chest pain, no palpitations and no dizziness. Asthma is in control. Uses albuterol occasionally and uses symbicort daily    Taking medications regularly. No side effects noted. Review of Systems   Constitutional: Negative. HENT: Negative. Eyes: Negative. Respiratory: Negative. Cardiovascular: Negative for chest pain, palpitations and leg swelling. Gastrointestinal: Negative. Endocrine: Negative. Genitourinary: Negative. Musculoskeletal: Negative. Skin: Negative. Allergic/Immunologic: Negative. Hematological: Negative. Negative for adenopathy. Does not bruise/bleed easily. Psychiatric/Behavioral: Negative. Negative for sleep disturbance and suicidal ideas. OBJECTIVE: /80   Pulse 72   Temp 98.9 °F (37.2 °C) (Oral)   Resp 16   Ht 5' 5\" (1.651 m)   Wt 163 lb 12.8 oz (74.3 kg)   LMP 2021 (Exact Date)   SpO2 98%   BMI 27.26 kg/m²     Wt Readings from Last 3 Encounters:   21 163 lb 12.8 oz (74.3 kg)   20 157 lb (71.2 kg)   20 155 lb (70.3 kg)   Patient was seen taking COVID-19 precautions. Face mask, gloves and eyes protectors were used. Patient also wore facemask. GENERAL:  Alert, oriented, pleasant, in no apparent distress. HEENT:  Conjunctiva pink, no scleral icterus. ENT clear. NECK: Supple. No jugular venous distention noted. No masses felt,  CARDIOVASCULAR:  Normal S1 and S2    PULMONARY:  No respiratory distress. No wheezes or rales. ABDOMEN:  Soft and non-tender,no masses  ororganomegaly. EXTREMITIES:  No cyanosis, clubbing, or significant edema. SKIN: Skin is warm and dry. NEUROLOGICAL:  Cranial nerves II through XII are grossly intact. IMPRESSION:    Encounter Diagnoses   Name Primary?  Anxiety     Cough variant asthma     Essential hypertension     Thyroid [facility])        ASSESSMENT/PLAN:    Anxiety  Patient has anxiety that is controlled with BuSpar 5 mg twice a day and Vistaril as needed. Cough variant asthma  Patient has cough variant asthma and takes medication. Patient is on Symbicort and is tolerating well. Patient is also on albuterol inhaler and Singulair. Essential hypertension  Patient has hypertension that is controlled. Continue diltiazem  mg twice a day. Heart rate is 72    Thyroid [facility])  Patient has history of thyroid cancer. She is on Synthroid 125 mcg daily and 100 tablets on Sundays. Patient sees endocrinologist in [place]    Pt wants to know about menopause and has irregular menses  Gave literature about menopause  She has gynec also. Return to office in 3 months. Mediations reviewed with the patient. Continue current medications. Appropriate prescriptions are addressed. After visit summeryprovided. Follow up as directed sooner if needed. Questions answered and patient verbalizes understanding. Call for any problems, questions, or concerns.        No Known Allergies  Current Outpatient Medications   Medication Sig Dispense Refill    busPIRone (BUSPAR) 5 MG tablet TAKE 1 TABLET BY MOUTH TWICE A DAY 60 tablet 0    hydrOXYzine (VISTARIL) 25 MG capsule TAKE 1 CAPSULE BY MOUTH DAILY AS NEEDED FOR ANXIETY 30 capsule 0    budesonide-formoterol (SYMBICORT) 160-4.5 MCG/ACT AERO Inhale 2 puffs into the lungs 2 times daily 1 Inhaler 5    traZODone (DESYREL) 50 MG tablet TAKE 1 TABLET BY MOUTH EVERY DAY AT NIGHT 90 tablet 1    albuterol sulfate HFA (PROAIR HFA) 108 (90 Base) MCG/ACT inhaler TAKE 2 PUFFS BY MOUTH EVERY 6 HOURS AS NEEDED FOR WHEEZE 8.5 Inhaler 5    montelukast (SINGULAIR) 10 MG tablet TAKE 1 TABLET BY MOUTH EVERY DAY AT NIGHT 90 tablet 1    dilTIAZem (CARDIZEM CD) 120 MG extended release capsule TAKE 1 CAPSULE BY MOUTH TWICE A  capsule 1    levothyroxine (SYNTHROID) 125 MCG tablet Take 125 mcg by mouth Daily Takes 1.5 tab on Sunday only,  1 tab all other days      calcium carbonate (OSCAL) 500 MG TABS tablet Take 500 mg by mouth daily      MULTIPLE VITAMIN PO Take 1 tablet by mouth daily. No current facility-administered medications for this visit. Past Medical History:   Diagnosis Date    Abnormal mammogram 9/24/2019 9/24/19 : abnormal : repeat in 6 months. Pt was informed by radiologist     Cough variant asthma     Depression     resolved   July Rosales 12-     Pulmonary nodules     f/u by Dr. Vivian Garcia. Benign. Last CT 1/12 stable middle lobe nodules.  Thyroid ca ([address] 12/15/2015    Had total thyroidectomy by Dr Soy Luna On synthroid. Managed by Dr Chio Carmona.  Thyroid nodule     seen Dr. Chio Carmona, [place] left nodule was neg last seen 6/13 US q year Nodule increased in size per pt. He is planning to do bx next year Had thyroid nodule biopsy on 8/4/2015. Nodule was 2.6 cm left inferior nodule Pt underwent total thyroidectomy in 9/25/15     Past Surgical History:   Procedure Laterality Date    THYROID SURGERY  9-25-15    total thyroidectomy     Social History     Tobacco Use    Smoking status: Never Smoker    Smokeless tobacco: Never Used   Substance Use Topics    Alcohol use:  Yes     Alcohol/week: 0.0 standard drinks       LAB REVIEW:  CBC:   Lab Results   Component Value Date    WBC 4.6 06/20/2019    HGB 14.2 06/20/2019    HCT 41.0 06/20/2019     06/20/2019     Lipids:   Lab Results   Component Value Date    HDL 75 (H) 09/23/2020    LDLCALC 85 09/23/2020    TRIGLYCFAST 141 09/23/2020    CHOLFAST 188 09/23/2020     Renal:   Lab Results   Component Value Date    BUN 13 09/23/2020    CREATININE 0.8 09/23/2020     09/23/2020    K 4.6 09/23/2020    ALT 13 09/23/2020    AST 15 09/23/2020    GLUCOSE 100 09/23/2020    GLUF 108 06/27/2018     PT/INR: No results found for: INR  A1C: No results found for: Peg Dorado MD, 3/8/2021 , 9:27 AM

## 2021-06-23 NOTE — ASSESSMENT & PLAN NOTE
Had total thyroidectomy by Dr Delma Kaplan. On synthroid.  Managed by Dr Jef Guzman.  -he increased synthroid to 125 mcg daily except  1 1/2 tablet on Sundays

## 2021-06-23 NOTE — ASSESSMENT & PLAN NOTE
Patient has hypertension. Pt is on diltiazem  mg bid   Patient is stable. Continue current treatment.

## 2021-06-23 NOTE — PROGRESS NOTES
Raymundo Porras  Patient's  is 1970  Seen in office on 2021      SUBJECTIVE:  Shantel angel 45 y. o.year old female presents today   Chief Complaint   Patient presents with    Anxiety     3 month follow up    Medication Refill   Patient is here for follow-up of anxiety, hypertension, tachycardia. Patient states she is anxious about going back to work in person after COVID-19 restrictions. Patient states she is also under stress because of her mother's sickness. She is taking Vistaril on a daily basis and also BuSpar twice a day. The combination is helping her a lot. She is able to sleep well. And anxiety is in control. Patient denies any chest pain. No shortness of breath no palpitations no dizziness. No syncope or near syncope. Patient denies any depression or suicidal ideations. Patient has thyroid cancer and follows with [place] endocrinologist.  Patient has hypertension that is controlled. Patient has asthma and that is also in control. Taking medications regularly. No side effects noted. Review of Systems   Constitutional: Negative. HENT: Negative. Eyes: Negative. Respiratory: Negative. Negative for cough, shortness of breath and wheezing. Cardiovascular: Negative. Negative for chest pain, palpitations and leg swelling. Gastrointestinal: Negative. Endocrine: Negative. Genitourinary: Negative. Musculoskeletal: Negative. Skin: Negative. Allergic/Immunologic: Negative. Neurological: Negative. Hematological: Negative. Psychiatric/Behavioral: Negative. OBJECTIVE: /80   Pulse 72   Temp 99.1 °F (37.3 °C) (Oral)   Resp 16   Wt 160 lb 3.2 oz (72.7 kg)   LMP 2021 (Approximate)   SpO2 97%   BMI 26.66 kg/m²     Wt Readings from Last 3 Encounters:   21 160 lb 3.2 oz (72.7 kg)   21 163 lb 12.8 oz (74.3 kg)   20 157 lb (71.2 kg)        Patient was seen taking COVID-19 precautions. Face mask, gloves were used. Patient also wore facemask. GENERAL:  Alert, oriented, pleasant, in no apparent distress. HEENT:  Conjunctiva pink, no scleral icterus. ENT clear. NECK: Supple. No jugular venous distention noted. No masses felt,  CARDIOVASCULAR:  Normal S1 and S2    PULMONARY:  No respiratory distress. No wheezes or rales. ABDOMEN:  Soft and non-tender,no masses  ororganomegaly. EXTREMITIES:  No cyanosis, clubbing, or significant edema. SKIN: Skin is warm and dry. NEUROLOGICAL:  Cranial nerves II through XII are grossly intact. IMPRESSION:    Encounter Diagnoses   Name Primary?  Essential hypertension     Tachycardia     Thyroid ca (HCC)     Adjustment disorder with anxious mood     Adjustment insomnia     Anxiety     Cough variant asthma        ASSESSMENT/PLAN:    Essential hypertension   Patient has hypertension. Pt is on diltiazem  mg bid   Patient is stable. Continue current treatment. Tachycardia : stable. Thyroid ca [facility])   Had total thyroidectomy by Dr Miguel Henley. On synthroid. Managed by Dr Lotus Fine.  -he increased synthroid to 125 mcg daily except  1 1/2 tablet on Sundays    Adjustment disorder with anxious mood   Pt is on vistaril and is helping     Adjustment insomnia   Pt has insomnia because of anxiety and stress  Trial of trazodone 50 mg daily . Side effects explained. Anxiety   Pt is taking buspar 5 mg bid and is on vistaril prn during day time     Cough variant asthma   On, albuterol and singulair. Pt is on Symbicort  Patient is stable. Continue current treatment. Return to office in 3 months            Mediations reviewed with the patient. Continue current medications. Appropriate prescriptions are addressed. After visit summeryprovided. Follow up as directed sooner if needed. Questions answered and patient verbalizes understanding. Call for any problems, questions, or concerns.        No Known Allergies  Current Outpatient Medications   Medication Sig Dispense Refill    hydrOXYzine (VISTARIL) 25 MG capsule TAKE 1 CAPSULE BY MOUTH DAILY AS NEEDED FOR ANXIETY 30 capsule 1    busPIRone (BUSPAR) 5 MG tablet TAKE 1 TABLET BY MOUTH TWICE A DAY 60 tablet 2    dilTIAZem (CARDIZEM CD) 120 MG extended release capsule TAKE 1 CAPSULE BY MOUTH TWICE A  capsule 1    montelukast (SINGULAIR) 10 MG tablet TAKE 1 TABLET BY MOUTH EVERY DAY AT NIGHT 90 tablet 1    budesonide-formoterol (SYMBICORT) 160-4.5 MCG/ACT AERO Inhale 2 puffs into the lungs 2 times daily 1 Inhaler 5    traZODone (DESYREL) 50 MG tablet TAKE 1 TABLET BY MOUTH EVERY DAY AT NIGHT 90 tablet 1    albuterol sulfate HFA (PROAIR HFA) 108 (90 Base) MCG/ACT inhaler TAKE 2 PUFFS BY MOUTH EVERY 6 HOURS AS NEEDED FOR WHEEZE 8.5 Inhaler 5    levothyroxine (SYNTHROID) 125 MCG tablet Take 125 mcg by mouth Daily Takes 1.5 tab on Sunday only,  1 tab all other days      calcium carbonate (OSCAL) 500 MG TABS tablet Take 500 mg by mouth daily      MULTIPLE VITAMIN PO Take 1 tablet by mouth daily. No current facility-administered medications for this visit. Past Medical History:   Diagnosis Date    Abnormal mammogram 9/24/2019 9/24/19 : abnormal : repeat in 6 months. Pt was informed by radiologist     Cough variant asthma     Depression     resolved   Dulce Rai 12-     Pulmonary nodules     f/u by Dr. Kya Lowry. Benign. Last CT 1/12 stable middle lobe nodules.  Thyroid ca ([address] 12/15/2015    Had total thyroidectomy by Dr Susie Walker. On synthroid. Managed by Dr Eleanor Son.  Thyroid nodule     seen Dr. Eleanor Son bx left nodule was neg last seen 6/13 US q year Nodule increased in size per pt. He is planning to do bx next year Had thyroid nodule biopsy on 8/4/2015.   Nodule was 2.6 cm left inferior nodule Pt underwent total thyroidectomy in 9/25/15     Past Surgical History:   Procedure Laterality Date    THYROID SURGERY  9-25-15    total thyroidectomy     Social History Tobacco Use    Smoking status: Never Smoker    Smokeless tobacco: Never Used   Substance Use Topics    Alcohol use:  Yes     Alcohol/week: 0.0 standard drinks       LAB REVIEW:  CBC:   Lab Results   Component Value Date    WBC 4.6 06/20/2019    HGB 14.2 06/20/2019    HCT 41.0 06/20/2019     06/20/2019     Lipids:   Lab Results   Component Value Date    HDL 75 (H) 09/23/2020    LDLCALC 85 09/23/2020    TRIGLYCFAST 141 09/23/2020    CHOLFAST 188 09/23/2020     Renal:   Lab Results   Component Value Date    BUN 13 09/23/2020    CREATININE 0.8 09/23/2020     09/23/2020    K 4.6 09/23/2020    ALT 13 09/23/2020    AST 15 09/23/2020    GLUCOSE 100 09/23/2020    GLUF 108 06/27/2018     PT/INR: No results found for: INR  A1C: No results found for: Hector Rutledge MD, 6/23/2021 , 9:40 AM

## 2021-07-15 NOTE — TELEPHONE ENCOUNTER
Medication:   Requested Prescriptions     Pending Prescriptions Disp Refills    hydrOXYzine (VISTARIL) 25 MG capsule 30 capsule 1     Sig: Take 1 capsule by mouth daily as needed for Anxiety        Last Filled:  06/23/2021 #30 capsules 1 refill    Patient Phone Number: [phone] (home)     Last appt: 6/23/2021   Next appt: 9/23/2021    Last OARRS: No flowsheet data found.

## 2021-09-23 NOTE — ASSESSMENT & PLAN NOTE
Had total thyroidectomy by Dr Blaire Dawson. On synthroid.  Managed by Dr Tez Wilson.  -he increased synthroid to 125 mcg daily except  1 1/2 tablet on Sundays  Pt has f/u appointment in 11/2021

## 2021-09-23 NOTE — PROGRESS NOTES
Cat Elena  Patient's  is 1970  Seen in office on 2021      SUBJECTIVE:  Elijah angel 45 y. o.year old female presents today   Chief Complaint   Patient presents with    Follow-up     Patient is here for follow-up of hypertension, asthma, anxiety and insomnia. Patient states now she is working from home again and her anxiety level has decreased. She takes Vistaril almost every day and that is helping her. With the Vistaril she also feels like her asthma is getting better too. She uses inhalers and albuterol only as needed. Patient denies any chest pain. No shortness of breath. No cough or sputum production. No abdominal pain. She is sleeping well    She has not taking Covid 19 vaccine and is reluctant about that. Taking medications regularly. No side effects noted. Review of Systems   Constitutional: Negative. HENT: Negative. Eyes: Negative. Respiratory: Negative. Cardiovascular: Negative. Gastrointestinal: Negative. Endocrine: Negative. Genitourinary: Negative. Musculoskeletal: Negative. Skin: Negative. Allergic/Immunologic: Negative. Neurological: Negative. Hematological: Negative. Psychiatric/Behavioral: Negative. OBJECTIVE: /64   Pulse 80   Temp 98.8 °F (37.1 °C) (Oral)   Resp 16   Wt 162 lb (73.5 kg)   LMP 2021 (Approximate) Comment: irregulat  SpO2 98%   BMI 26.96 kg/m²     Wt Readings from Last 3 Encounters:   21 162 lb (73.5 kg)   21 160 lb 3.2 oz (72.7 kg)   21 163 lb 12.8 oz (74.3 kg)      GENERAL:  Alert, oriented, pleasant, in no apparent distress. HEENT:  Conjunctiva pink, no scleral icterus. ENT clear. NECK: Supple. No jugular venous distention noted. No masses felt,  CARDIOVASCULAR:  Normal S1 and S2    PULMONARY:  No respiratory distress. No wheezes or rales. ABDOMEN:  Soft and non-tender,no masses  ororganomegaly.   EXTREMITIES:  No cyanosis, clubbing, or significant edema.  SKIN: Skin is warm and dry. NEUROLOGICAL:  Cranial nerves II through XII are grossly intact. 1/7/21 :  Impression      Unchanged mammogram with no radiographic evidence of malignancy. BI-RADS:   BIRADS - CATEGORY 2   Benign, no evidence of malignancy.  Normal interval follow-up is recommended in 12 months. OVERALL ASSESSMENT - BENIGN       IMPRESSION:    Encounter Diagnoses   Name Primary?  Anxiety Yes    Adjustment insomnia     Cough variant asthma     Essential hypertension     Abnormal mammogram     Thyroid ca [address]     Adjustment disorder with anxious mood        ASSESSMENT/PLAN:     Essential hypertension   Patient has hypertension. Pt is on diltiazem  mg bid     Cough variant asthma   On, albuterol and singulair. Pt is on Symbicort: feels well. Thyroid ca [facility])   Had total thyroidectomy by Dr Brandi Elena. On synthroid. Managed by Dr Leeanne Botello.  -he increased synthroid to 125 mcg daily except  1 1/2 tablet on Sundays  Pt has f/u appointment in 11/2021       Abnormal mammogram   1/7/21 : [facility]amm[facility]     Adjustment insomnia   Pt has insomnia because of anxiety and stress  Trial of trazodone 50 mg daily . Side effects explained. Anxiety   Patient has anxiety. She was taking Vistaril as needed but states he still feels anxious. BuSpar 5 mg twice a day    Discussed with patient about the COVID-19 vaccine  Given CDC recommendations  Answered her questions  Patient is still reluctant to get the vaccine. She does not want it forced on people. Return to office in 3 months. Time spent 25 minutes    Mediations reviewed with the patient. Continue current medications. Appropriate prescriptions are addressed. After visit summeryprovided. Follow up as directed sooner if needed. Questions answered and patient verbalizes understanding. Call for any problems, questions, or concerns.        No Known Allergies  Current Outpatient Medications   Medication Sig Dispense Refill    hydrOXYzine (VISTARIL) 25 MG capsule TAKE 1 CAPSULE BY MOUTH DAILY AS NEEDED FOR ANXIETY 30 capsule 1    budesonide-formoterol (SYMBICORT) 160-4.5 MCG/ACT AERO TAKE 2 PUFFS BY MOUTH TWICE A DAY 10.2 Inhaler 5    busPIRone (BUSPAR) 5 MG tablet Take 1 tablet by mouth 2 times daily 180 tablet 1    dilTIAZem (CARDIZEM CD) 120 MG extended release capsule Take 1 capsule by mouth 2 times daily 180 capsule 1    montelukast (SINGULAIR) 10 MG tablet TAKE 1 TABLET BY MOUTH EVERY DAY AT NIGHT 90 tablet 1    traZODone (DESYREL) 50 MG tablet Take 1 tablet by mouth nightly 90 tablet 1    albuterol sulfate HFA (PROAIR HFA) 108 (90 Base) MCG/ACT inhaler TAKE 2 PUFFS BY MOUTH EVERY 6 HOURS AS NEEDED FOR WHEEZE 8.5 Inhaler 5    levothyroxine (SYNTHROID) 125 MCG tablet Take 125 mcg by mouth Daily Takes 1.5 tab on Sunday only,  1 tab all other days      calcium carbonate (OSCAL) 500 MG TABS tablet Take 500 mg by mouth daily      MULTIPLE VITAMIN PO Take 1 tablet by mouth daily. No current facility-administered medications for this visit. Past Medical History:   Diagnosis Date    Abnormal mammogram 9/24/2019 9/24/19 : abnormal : repeat in 6 months. Pt was informed by radiologist     Cough variant asthma     Depression     resolved   Jasvir Patiño 12-     Pulmonary nodules     f/u by Dr. Paige Bryson. Benign. Last CT 1/12 stable middle lobe nodules.  Thyroid ca ([address] 12/15/2015    Had total thyroidectomy by Dr Sera Michael. On synthroid. Managed by Dr Camilo Benjamin.  Thyroid nodule     seen Dr. Camilo Benjamin, bx left nodule was neg last seen 6/13 US q year Nodule increased in size per pt. He is planning to do bx next year Had thyroid nodule biopsy on 8/4/2015.   Nodule was 2.6 cm left inferior nodule Pt underwent total thyroidectomy in 9/25/15     Past Surgical History:   Procedure Laterality Date    THYROID SURGERY  9-25-15    total thyroidectomy     Social History     Tobacco Use    Smoking status: Never Smoker    Smokeless tobacco: Never Used   Substance Use Topics    Alcohol use:  Yes     Alcohol/week: 0.0 standard drinks       LAB REVIEW:  CBC:   Lab Results   Component Value Date    WBC 4.6 06/20/2019    HGB 14.2 06/20/2019    HCT 41.0 06/20/2019     06/20/2019     Lipids:   Lab Results   Component Value Date    HDL 75 (H) 09/23/2020    LDLCALC 85 09/23/2020    TRIGLYCFAST 141 09/23/2020    CHOLFAST 188 09/23/2020     Renal:   Lab Results   Component Value Date    BUN 13 09/23/2020    CREATININE 0.8 09/23/2020     09/23/2020    K 4.6 09/23/2020    ALT 13 09/23/2020    AST 15 09/23/2020    GLUCOSE 100 09/23/2020    GLUF 108 06/27/2018     PT/INR: No results found for: INR  A1C: No results found for: Jessica Brennan MD, 9/23/2021 , 8:54 AM

## 2021-09-23 NOTE — ASSESSMENT & PLAN NOTE
Pt has insomnia because of anxiety and stress  Trial of trazodone 50 mg daily . Side effects explained.

## 2021-09-23 NOTE — ASSESSMENT & PLAN NOTE
Patient has anxiety. She was taking Vistaril as needed but states he still feels anxious.   BuSpar 5 mg twice a day

## 2021-12-07 PROBLEM — U07.1 COVID-19 VIRUS INFECTION: Status: ACTIVE | Noted: 2021-01-01

## 2021-12-07 NOTE — PROGRESS NOTES
Sneha Mariee  Patient's  is 1970  Seen in office on 2021    Patient is seen through virtual video visit  Patient is at home  Provider in the office      SUBJECTIVE:  Brittany angel 45 y. o.year old female presents today   Chief Complaint   Patient presents with    Cough     productive    Fever     99.6-101    Nausea & Vomiting     11-    Diarrhea     -    Other     eyes itchy, LOSS of taste and smell    Shortness of Breath     \"feels heavy all over\"    Other     one pfizer vaccine 10-, symptoms started 2021     Patient states 2020 when she started having vomiting and diarrhea  Later she had cough productive and a slight fever. Temperature was 99.6-1 01. She lost her taste and smell felt heavy all over the body and mild shortness of breath. She decided to go to the urgent care at [facility] urgent care in [place] on 2021 and was tested positive for COVID-19. She was given prednisone 40 mg daily for 10 days and Phenergan DM. Patient has itchy eyes  Today patient is feeling better. Her temperature is 98.7. She has mild shortness of breath with using her inhalers. She does not have oximeter. Denies any fever or chills. No body ache now. She is breathing better no nausea vomiting. She had taken first dose of Covid on 10/29/2021    Taking medications regularly. No side effects noted. Review of Systems  Review of system on normal except as in HPI  OBJECTIVE: There were no vitals taken for this visit. Wt Readings from Last 3 Encounters:   21 162 lb (73.5 kg)   21 160 lb 3.2 oz (72.7 kg)   21 163 lb 12.8 oz (74.3 kg)      Patient looks comfortable  Awake alert and oriented  Breathing normal  No facial droop    IMPRESSION:    Encounter Diagnoses   Name Primary?  COVID-19 virus infection Yes    Cough variant asthma     Essential hypertension        ASSESSMENT/PLAN:    Patient had Covid 19 infection.   She has a history of asthma and also hypertension and she does qualify for monoclonal antibodies. Discussed with patient and she is willing to get that. We will make arrangements for monoclonal antibody infusion. Patient will stop by for O2 saturation check sitting in the car  Advised patient to take precautions and patient has quarantined herself    Patient has taken COVID-19 vaccine first dose of Luciano Che 10/29/2021. She did not take the second dose that she was sick. Patient states it is mandatory at her work to take the vaccine. Advised patient to take the vaccine 90 days after she tested positive for COVID-19. A letter stating that will be given to the patient. If her symptoms get worse patient should call or go to the ER especially if she gets high fever or chills, short of breath her O2 saturation drops below 90  . Continue inhalers    Keep follow-up appointment    Bobbi Rowland, was evaluated through a synchronous (real-time) audio-video encounter. The patient (or guardian if applicable) is aware that this is a billable service. Verbal consent to proceed has been obtained within the past 12 months. The visit was conducted pursuant to the emergency declaration under the [address] [place] [address], [address] [place] [address] authority and the D[employer] and [employer] General Act. Patient identification was verified, and a caregiver was present when appropriate. The patient was located in a state where the provider was credentialed to provide care. Total time spent for this encounter: Not billed by time    --Tarri Gaucher, MD on 12/7/2021 at 9:33 AM    An electronic signature was used to authenticate this note. Mediations reviewed with the patient. Continue current medications. Appropriate prescriptions are addressed. After visit summeryprovided. Follow up as directed sooner if needed. Questions answered and patient verbalizes understanding.  Call for any problems, questions, or concerns. No Known Allergies  Current Outpatient Medications   Medication Sig Dispense Refill    predniSONE (DELTASONE) 20 MG tablet Take 40 mg by mouth daily      promethazine-dextromethorphan (PROMETHAZINE-DM) 6.25-15 MG/5ML syrup Take 5 mLs by mouth every 4 hours as needed      hydrOXYzine (VISTARIL) 25 MG capsule TAKE 1 CAPSULE BY MOUTH DAILY AS NEEDED FOR ANXIETY 30 capsule 1    budesonide-formoterol (SYMBICORT) 160-4.5 MCG/ACT AERO TAKE 2 PUFFS BY MOUTH TWICE A DAY 10.2 Inhaler 5    busPIRone (BUSPAR) 5 MG tablet Take 1 tablet by mouth 2 times daily 180 tablet 1    dilTIAZem (CARDIZEM CD) 120 MG extended release capsule Take 1 capsule by mouth 2 times daily 180 capsule 1    montelukast (SINGULAIR) 10 MG tablet TAKE 1 TABLET BY MOUTH EVERY DAY AT NIGHT 90 tablet 1    traZODone (DESYREL) 50 MG tablet Take 1 tablet by mouth nightly 90 tablet 1    albuterol sulfate HFA (PROAIR HFA) 108 (90 Base) MCG/ACT inhaler TAKE 2 PUFFS BY MOUTH EVERY 6 HOURS AS NEEDED FOR WHEEZE 8.5 Inhaler 5    levothyroxine (SYNTHROID) 125 MCG tablet Take 125 mcg by mouth Daily Takes 1.5 tab on Sunday only,  1 tab all other days      calcium carbonate (OSCAL) 500 MG TABS tablet Take 500 mg by mouth daily      MULTIPLE VITAMIN PO Take 1 tablet by mouth daily. No current facility-administered medications for this visit. Past Medical History:   Diagnosis Date    Abnormal mammogram 9/24/2019 9/24/19 : abnormal : repeat in 6 months. Pt was informed by radiologist     Cough variant asthma     Depression     resolved   Victoria Granados 12-     Pulmonary nodules     f/u by Dr. Jaqui Espinal. Benign. Last CT 1/12 stable middle lobe nodules.  Thyroid ca ([address] 12/15/2015    Had total thyroidectomy by Dr Briana Rayo. On synthroid. Managed by Dr Kings Hastings.  Thyroid nodule     seen kentrell Wheatley left nodule was neg last seen 6/13 US q year Nodule increased in size per pt.  He is planning to do bx next year Had thyroid nodule biopsy on 8/4/2015. Nodule was 2.6 cm left inferior nodule Pt underwent total thyroidectomy in 9/25/15     Past Surgical History:   Procedure Laterality Date    THYROID SURGERY  9-25-15    total thyroidectomy     Social History     Tobacco Use    Smoking status: Never Smoker    Smokeless tobacco: Never Used   Substance Use Topics    Alcohol use:  Yes     Alcohol/week: 0.0 standard drinks       LAB REVIEW:  CBC:   Lab Results   Component Value Date    WBC 4.6 06/20/2019    HGB 14.2 06/20/2019    HCT 41.0 06/20/2019     06/20/2019     Lipids:   Lab Results   Component Value Date    HDL 75 (H) 09/23/2020    LDLCALC 85 09/23/2020    TRIGLYCFAST 141 09/23/2020    CHOLFAST 188 09/23/2020     Renal:   Lab Results   Component Value Date    BUN 13 09/23/2020    CREATININE 0.8 09/23/2020     09/23/2020    K 4.6 09/23/2020    ALT 13 09/23/2020    AST 15 09/23/2020    GLUCOSE 100 09/23/2020    GLUF 108 06/27/2018     PT/INR: No results found for: INR  A1C: No results found for: Best Kline MD, 12/7/2021 , 8:37 AM

## 2021-12-07 NOTE — LETTER
[address] [place] [place] [facility]  [address]  Phone: [phone]  Fax: [phone]    Mirna Sotomayor MD        December 7, 2021      To Whom it May Concern:    Jenniffer Bah was seen by me on 12-7-21 for  covid-19 symptoms. Due to the fact that she tested positive on 12-4-2021, she does not qualify to take the second dose of the [place] vaccine until 90 days after 12-4-2021.     Sincerely,        Mirna Sotomayor MD

## 2021-12-07 NOTE — Clinical Note
[facility] [place] [facility]  [facility]  Phone: [phone]  Fax: [phone]    Onofre Samaniego MD        December 7, 2021     Patient: Dayne Osler   YOB: 1970   Date of Visit: 12/7/2021       To Whom It May Concern: It is my medical opinion that Jesusita Laser {Work release (duty restriction):61828}. If you have any questions or concerns, please don't hesitate to call.     Sincerely,        Onofre Samaniego MD

## 2021-12-08 NOTE — ED PROVIDER NOTES
The Ekg interpreted by me shows  sinus tachycardia, ljls=466 with a rate of 103  Axis is   Normal  QTc is  normal  Intervals and Durations are unremarkable.       ST Segments: no acute change  No old EKG            Kirk Corral MD  12/08/21 [id number]

## 2021-12-08 NOTE — PROGRESS NOTES
Dr Shilpa Myers instructed to send pt to ER d/t shallow breathing and low o2 sat of 89% even after using pt supplied inhaler. Pt voiced understanding of info given.

## 2021-12-08 NOTE — PROGRESS NOTES
Pt taken to room covid infusion unit. Pt oriented to room, call light, bed/chair controls, TV, pt voiced understanding. Plan of care explained to pt, pt voiced understanding.

## 2021-12-09 PROBLEM — J96.01 ACUTE RESPIRATORY FAILURE WITH HYPOXIA (HCC): Status: ACTIVE | Noted: 2021-01-01

## 2021-12-09 PROBLEM — J12.82 PNEUMONIA DUE TO COVID-19 VIRUS: Status: ACTIVE | Noted: 2021-01-01

## 2021-12-09 NOTE — ED NOTES
Pt now on high flow cannula at 12L, Pt spO2 92%, MARINO Lopez notified     Jacklyn Ortiz RN  12/08/21 [id number]

## 2021-12-09 NOTE — ED PROVIDER NOTES
As provider- in-in-triage, I performed a medical screening history and physical exam on this patient. HISTORY OF PRESENT ILLNESS  Dolores Escalante is a 45 y.o. female who presents to the emergency department today with shortness of breath. Patient states that she has had recent Covid 19. Was supposed to get a Regeneron therapy today but was found to be hypoxic. She was sent in for further evaluation of this. .      PHYSICAL EXAM  /80   Pulse 102   Temp 98.4 °F (36.9 °C)   Resp 18   Wt 162 lb (73.5 kg)   SpO2 93%   BMI 26.96 kg/m²     On exam, the patient appears in no acute distress. Speech is clear. Breathing is unlabored. Moves all extremities    Comment: Please note this report has been produced using speech recognition software and may contain errors related to that system including errors in grammar, punctuation, and spelling, as well as words and phrases that may be inappropriate. If there are any questions or concerns please feel free to contact the dictating provider for clarification.         Ricardo Nelson 411, PA  12/08/21 2048

## 2021-12-09 NOTE — ED NOTES
Pt continues to drop O2 sats into the mid 80's frequently. Maintaining about 87-92% throughout the night and dropping into the 70's with very little exertion. Message sent to [facility].       Karly Suarez RN  12/09/21 0338

## 2021-12-09 NOTE — CONSULTS
Pulmonary Consult Note      Reason for Consult: covid 23  Requesting Physician:  Tyra Heaton MD  Subjective:   CHIEF COMPLAINT :SOB    Patient Active Problem List    Diagnosis Date Noted    Acute respiratory failure with hypoxia ([facility] [address] 12/09/2021    COVID-19 virus infection 12/07/2021     Overview Note:     Tested positive on 12/4/2021      Anxiety 12/28/2020     Overview Note:     Patient has anxiety. She was taking Vistaril as needed but states he still feels anxious. BuSpar 5 mg twice a day      Adjustment insomnia 09/23/2020     Overview Note:     Pt has insomnia because of anxiety and stress  Trial of trazodone 50 mg daily . Side effects explained.  Abnormal mammogram 09/24/2019     Overview Note:     9/24/19 : abnormal : repeat in 6 months. Pt was informed by radiologist   Mammogram in 3/20 was normal.  Patient is followed by gynecologist  1/7/21 : Mariel Hwang normal at [address] Tachycardia 06/26/2018    Essential hypertension 06/26/2018     Overview Note:     Patient has hypertension. Pt is on diltiazem  mg bid       Adjustment disorder with anxious mood 03/23/2018     Overview Note:     Pt wants to try vistaril prn.  Thyroid ca ([facility] [address] 12/15/2015     Overview Note:     Had total thyroidectomy by Dr Kaleb Hines. On synthroid. Managed by Dr Goran Marquez.  -he increased synthroid to 125 mcg daily except  1 1/2 tablet on Sundays  Pt has f/u appointment in 11/2021      Cough variant asthma      Overview Note:     On, albuterol and singulair. Pt is on Symbicort: feels well. HPI:                The patient is a 45 y.o. female with significant past medical history of hypertension, mild intermittent asthma, secondary hypothyroidism (had complete thyroidectomy), anxiety, insomnia  presents with complaints of SOB, fever, cough,chills, body aches x 1 week. She was hypoxic in the ER. She has been tested positive for COVID-19. She had 1 dose of COVID vaccine on 10/29/21.  She is negative for fevers, chills, diaphoresis, activity change, appetite change, fatigue, night sweats and unexpected weight change. EYES:  negative for blurred vision, eye discharge, visual disturbance and icterus  HEENT:  negative for hearing loss, tinnitus, ear drainage, sinus pressure, nasal congestion, epistaxis and snoring  RESPIRATORY:  See HPI  CARDIOVASCULAR:  negative for chest pain, palpitations, exertional chest pressure/discomfort, edema, syncope  GASTROINTESTINAL:  negative for nausea, vomiting, diarrhea, constipation, blood in stool and abdominal pain  GENITOURINARY:  negative for frequency, dysuria, urinary incontinence, decreased urine volume, and hematuria  HEMATOLOGIC/LYMPHATIC:  negative for easy bruising, bleeding and lymphadenopathy  ALLERGIC/IMMUNOLOGIC:  negative for recurrent infections, angioedema, anaphylaxis and drug reactions  ENDOCRINE:  negative for weight changes and diabetic symptoms including polyuria, polydipsia and polyphagia  MUSCULOSKELETAL:  negative for  pain, joint swelling, decreased range of motion and muscle weakness  NEUROLOGICAL:  negative for headaches, slurred speech, unilateral weakness  PSYCHIATRIC/BEHAVIORAL: negative for hallucinations, behavioral problems, confusion and agitation.      Objective:   PHYSICAL EXAM:      VITALS:  /85   Pulse 99   Temp 97.5 °F (36.4 °C) (Axillary)   Resp 24   Wt 162 lb (73.5 kg)   SpO2 94%   BMI 26.96 kg/m²   24HR INTAKE/OUTPUT:  No intake or output data in the 24 hours ending 21 1225  CURRENT PULSE OXIMETRY:  SpO2: 94 %  24HR PULSE OXIMETRY RANGE:  SpO2  Av.7 %  Min: 81 %  Max: 98 %    CONSTITUTIONAL:  awake, alert, cooperative, no apparent distress, and appears stated age  NECK:  Supple, symmetrical, trachea midline, no adenopathy, thyroid symmetric, not enlarged and no tenderness, skin normal  LUNGS:  Occasional basal crackles  CARDIOVASCULAR:  normal S1 and S2, no edema and no JVD  ABDOMEN:  normal bowel sounds, non-distended and no masses palpated, and no tenderness to palpation. No hepatospleenomegaly  LYMPHADENOPATHY:  no axillary or supraclavicular adenopathy. No cervical adnenopathy  PSYCHIATRIC: Oriented to person place and time. No obvious depression or anxiety. MUSCULOSKELETAL: No obvious misalignment or effusion of the joints. No clubbing, cyanosis of the digits. SKIN:  normal skin color, texture, turgor and no redness, warmth, or swelling.  No palpable nodules    DATA:    Old records have been reviewed  CBC with Differential:    Lab Results   Component Value Date    WBC 6.2 12/09/2021    RBC 4.29 12/09/2021    HGB 13.4 12/09/2021    HCT 39.9 12/09/2021     12/09/2021    MCV 93.0 12/09/2021    MCH 31.2 12/09/2021    MCHC 33.6 12/09/2021    RDW 12.9 12/09/2021    SEGSPCT 75.0 12/09/2021    BANDSPCT 11 12/09/2021    LYMPHOPCT 8.0 12/09/2021    MONOPCT 6.0 12/09/2021    BASOPCT 0.7 06/20/2019    MONOSABS 0.4 12/09/2021    LYMPHSABS 0.5 12/09/2021    EOSABS 0.1 06/20/2019    BASOSABS 0.0 06/20/2019    DIFFTYPE MANUAL DIFFERENTIAL 12/09/2021     BMP:    Lab Results   Component Value Date     12/09/2021    K 4.2 12/09/2021    CL 97 12/09/2021    CO2 26 12/09/2021    BUN 10 12/09/2021    CREATININE 0.7 12/09/2021    CALCIUM 8.3 12/09/2021    GFRAA >60 12/09/2021    LABGLOM >60 12/09/2021    GLUCOSE 156 12/09/2021     Hepatic Function Panel:    Lab Results   Component Value Date    ALKPHOS 52 12/09/2021    ALT 21 12/09/2021    AST 25 12/09/2021    PROT 6.7 12/09/2021    BILITOT 0.2 12/09/2021     ABG:  No results found for: HHL4ZRU, BEART, B6RIWAUP, PHART, THGBART, NRP3HBP, PO2ART, IRJ9MUV    Cultures:   Pending    Radiology Review:      Patchy infiltrates within the lower lungs bilaterally, most compatible with   multifocal pneumonia.  Consider both typical and atypical etiologies,   including viral pneumonia.       There is mild pulmonary vascular congestion, and therefore correlate with any   clinical evidence of superimposed pulmonary edema. No evidence of pulmonary embolism.       Extensive scattered areas of patchy and ground-glass opacities are noted   throughout the bilateral lungs. These are nonspecific but could indicate an   atypical/viral pneumonia. Assessment/Plan       Patient Active Problem List    Diagnosis Date Noted    Acute respiratory failure with hypoxia ([facility] [address] 12/09/2021    COVID-19 virus infection 12/07/2021     Overview Note:     Tested positive on 12/4/2021      Anxiety 12/28/2020     Overview Note:     Patient has anxiety. She was taking Vistaril as needed but states he still feels anxious. BuSpar 5 mg twice a day      Adjustment insomnia 09/23/2020     Overview Note:     Pt has insomnia because of anxiety and stress  Trial of trazodone 50 mg daily . Side effects explained.  Abnormal mammogram 09/24/2019     Overview Note:     9/24/19 : abnormal : repeat in 6 months. Pt was informed by radiologist   Mammogram in 3/20 was normal.  Patient is followed by gynecologist  1/7/21 : Floyce Sluder normal at [address] Tachycardia 06/26/2018    Essential hypertension 06/26/2018     Overview Note:     Patient has hypertension. Pt is on diltiazem  mg bid       Adjustment disorder with anxious mood 03/23/2018     Overview Note:     Pt wants to try vistaril prn.  Thyroid ca ([facility] [address] 12/15/2015     Overview Note:     Had total thyroidectomy by Dr Rosalina Ruby. On synthroid. Managed by Dr Wilber Thomas.  -he increased synthroid to 125 mcg daily except  1 1/2 tablet on Sundays  Pt has f/u appointment in 11/2021      Cough variant asthma      Overview Note:     On, albuterol and singulair. Pt is on Symbicort: feels well. Acute Hypoxic resp failure  Bilateral Pneumonia sec to COVID-19  Sec. Hypothryroidism  Anxiety    PLAN  1. Decadron  2. Insulin  3. Inhalers  4. Tocilizumab  5. CXR in am  7. Keep sats > 92%  8. ICS  9. OOB  10. Prone ventilation as tolerated  11.  DVT and GI Prophylaxis  12.  C/w present management        Electronically signed by Carmencita Thomas MD on 12/9/2021 at 12:25 PM

## 2021-12-09 NOTE — PROGRESS NOTES
Pharmacy Consult for Tocilizumab Initiation per Dr. Carlos Alberto Sprague per [facility] P&T Committee  **All criteria need to be met to receive   Tocilizumab for COVID-19 patients**   Age    >22 years old   Yes   Ordering Provider    Restricted to ID, intensivists, or pulmonology  Hospitalist may order in ID absence      Hospitalist   Laboratory Results    Confirmed positive COVID-19    CRP >75 mg/L     Yes  CRP: 178.4   Clinical Status       Within 24 hours of vital organ support   (HFNC, heated vapotherm, biPAP, mechanical ventilation, or vasopressor)  OR   Rapidly increasing oxygen needs       Yes   Concomitant Therapy    Receiving systemic steroids for treatment of COVID 19     Yes   Oxygen Status     Requiring invasive or non-invasive mechanical ventilation   (>10L NC, HFNC, heated vapotherm, biPAP, mechanical ventilation)   Yes  NRB 15L/min     Contraindications    1. Invasive active mycobacterial or fungal infection  2. Platelet <120,664 or active bleeding  3. Significant immunosuppression   4.  Elevated AST/ALT >10x ULN          LIVER FUNCTION LAB EVALUATION  Recent Labs     12/09/21  0500   AST 25   ALT 21   ALKPHOS 52   BILITOT 0.2   INR 0.88     Platelets   Date Value Ref Range Status   12/09/2021 144 140 - 440 K/CU MM Final   12/08/2021 142 140 - 440 K/CU MM Final   06/20/2019 187 135 - 450 K/uL Final   04/11/2017 161 140 - 440 K/CU MM Final   09/11/2015 219 140 - 440 K/CU MM Final     Wt Readings from Last 3 Encounters:   12/08/21 162 lb (73.5 kg)   09/23/21 162 lb (73.5 kg)   06/23/21 160 lb 3.2 oz (72.7 kg)     Dosing Recommendations:    (One dose maximum)    Dose when compounding from IV vial:  800 mg: Patient weight >90 kg x 1 dose   600 mg: Patient weight >65 kg to ? 90 kg x 1 dose   400 mg: Patient weight >30 kg to ? 65 kg x 1 dose   12 mg/kg: Patient weight ? 30 kg x 1 dose     Thank you for the consult,  CALVIN Márquez [facility], PharmD

## 2021-12-09 NOTE — SIGNIFICANT EVENT
SUBJECTIVE:     Complaint of hypoxia, not saturating well on nasal cannula, worsening hypoxia. Patient seen and examined in ED15/ED-15.      OBJECTIVE:      VITALS  Vitals:    12/09/21 0514 12/09/21 0520 12/09/21 0521 12/09/21 0522   BP:       Pulse:       Resp:       Temp:       SpO2: (!) 84% (!) 87% (!) 88% (!) 86%   Weight:              PHYSICAL EXAM   GEN AxO for  HENT atraumatic/patent  RESP diminished breath sounds   CARDIO/VASC Tachycardia RRR  GI Soft, non distended  SKIN warm  NEURO No lateralization  PSYCH Cooperative      LABS  ABG  No results found for: PH, PCO2, PO2, HCO3, O2SAT      ASSESSMENT/PLAN:    Hypoxic respiratory failure in setting of COVID-19, requiring BiPAP  Also likely secondary to asthma exacerbation, continue steroids    Maria Klein MD  Internal Medicine Hospitalist  12/9/2021 5:54 AM    Due to the immediate potential for life-threatening deterioration due worsening acute hypoxic respiratory failure now requiring BiPAP to, I spent 35 minutes providing critical care

## 2021-12-09 NOTE — PROGRESS NOTES
Subjective: Patient is a 76-year-old female with history of hypertension, asthma, hypothyroidism status post thyroidectomy, anxiety insomnia who was admitted with dyspnea. symptoms started on 11/30. She went to urgent care and tested positive on 12/4 for Covid, received Regeneron with her PCP on 12/8      Vitals: Afebrile, heart rate 90s range, normotensive, on 15 L of oxygen      Home medications: Prednisone, Symbicort, buspirone, diltiazem, montelukast, trazodone, levothyroxine, tocilizumab    Current medications: DuoNebs, Symbicort, buspirone, dexamethasone, diltiazem, Lovenox, levothyroxine, montelukast    Labs: Sodium 133, potassium 4.2, chloride 97, bicarbonate 6, creatinine 0.7  LFTs normal  WBC 6.2, hemoglobin 13.4, platelets 302  [id number] positive  Procan 0.053    Imaging: CT PE  No evidence of pulmonary embolism. Extensive scattered areas of patchy and ground-glass opacities are noted   throughout the bilateral lungs. These are nonspecific but could indicate an   atypical/viral pneumonia.     Plan:   Severe sepsis secondary to COVID-19 pneumonia with acute hypoxic respiratory failure as endorgan defect  Acute on chronic COPD      On admission patient was on high flow oxygen, 15 L, placed on BiPAP, CT chest did not show any PE, did show patchy opacities bilaterally, dexamethasone duo nebs  Pulmonology consult  Remdesivir and baricitinib per pulmonology  Tocilizumab x1 status post Regeneron antibodies    Mood disorder: Buspirone    Hypothyroidism: Levothyroxine    A. fib: Continue diltiazem    Prophylaxis: Lovenox

## 2021-12-09 NOTE — H&P
HISTORY AND PHYSICAL  (Hospitalist, Internal Medicine)  IDENTIFYING INFORMATION   PATIENT:  Heri Lopez  MRN:  [id number]  ADMIT DATE: 12/8/2021      CHIEF COMPLAINT   Shortness of breath. HISTORY OF PRESENT ILLNESS   Heri Lopez is a 45 y.o. female with hypertension, mild intermittent asthma, secondary hypothyroidism (had complete thyroidectomy), anxiety, insomnia, got her first dose of Covid vaccine 10/29/2021 presented to ED with complaints of shortness of breath. Patient reports that she started having symptoms since 11/30, was tested positive for Covid 12/4. Patient reported having fever, chills, cough, vomiting, diarrhea. She went to urgent care on 12/4 and was tested positive for Covid. Patient's primary care physician arranged for her to get Regeneron on 12/8. When she was in the infusion center patient was noted to be hypoxic-89% on room air and hence was transferred to ER. Patient is currently complaining of shortness of breath which has worsened since last night. Patient did not have an oximeter and did not check her oxygen saturation. Patient admits to having coughing spells with deep breathing. Patient was prescribed prednisone, promethazine-DM by her primary care physician. At presentation patient was noted to have /80, , RR 18, temp 98.4, saturating 89% on room air. While in the ER patient's oxygen requirement went up to 10 L->12L high flow nasal cannula. When I saw the patient she was on 12 L high flow nasal cannula and was saturating 89-93%. Later she was placed on 15 L oxygen through high flow. Lab work significant for sodium 134, random glucose 183. LFTs, CBC within normal range. D-dimer was mildly elevated. CTA chest-no PE extensive scattered areas of patchy and groundglass opacities are noted throughout the bilateral lungs.   VBG-pH is 7.45, PCO2 37, PO2 51, HCO3 25.7, O2 sat 86%. Patient received DuoNeb HFA, dexamethasone 6 mg in ED. PAST MEDICAL HISTORY PAST SURGICAL HISTORY   hypertension, mild intermittent asthma, secondary hypothyroidism (had complete thyroidectomy), anxiety, insomnia, had her first Covid vaccine 10/29/2021  total thyroidectomy   FAMILY HISTORY SOCIAL HISTORY    Reviewed and noncontributory denies smoking, occasional alcohol use, denies any illicit drug use   MEDICATIONS ALLERGIES    Reviewed medications with patient-hydroxyzine 25 mg as needed, Symbicort twice daily, BuSpar 5 mg twice daily, Cardizem 120 mg twice daily, Singulair 10 mg nightly, trazodone 50 mg nightly, albuterol HFA as needed, levothyroxine 125 MCG daily (Monday to Saturday, 1.5 tabs on Sunday. No known drug allergies. PAST MEDICAL, SURGICAL, FAMILY, and SOCIAL HISTORY         Past Medical History:   Diagnosis Date    Abnormal mammogram 9/24/2019 9/24/19 : abnormal : repeat in 6 months. Pt was informed by radiologist     Cough variant asthma     COVID-19 virus infection 12/7/2021    Tested positive on 12/4/2021    Depression     resolved   Elihu Lob     Dr. Rosalind Hair 12-     Pulmonary nodules     f/u by Dr. Nicolas Palm. Benign. Last CT 1/12 stable middle lobe nodules.  Thyroid ca ([address] 12/15/2015    Had total thyroidectomy by Dr Alyssia Velasquez. On synthroid. Managed by Dr Shelton Arce.  Thyroid nodule     seen Dr. Shelton Arce, bx left nodule was neg last seen 6/13 US q year Nodule increased in size per pt. He is planning to do bx next year Had thyroid nodule biopsy on 8/4/2015. Nodule was 2.6 cm left inferior nodule Pt underwent total thyroidectomy in 9/25/15     Past Surgical History:   Procedure Laterality Date    THYROID SURGERY  9-25-15    total thyroidectomy     History reviewed. No pertinent family history.   Family Hx of HTN  Family Hx as reviewed above, otherwise non-contributory  Social History     Socioeconomic History    Marital status:  Spouse name: None    Number of children: None    Years of education: None    Highest education level: None   Occupational History    None   Tobacco Use    Smoking status: Never Smoker    Smokeless tobacco: Never Used   Substance and Sexual Activity    Alcohol use: Yes     Alcohol/week: 0.0 standard drinks    Drug use: No    Sexual activity: Yes     Partners: Male   Other Topics Concern    None   Social History Narrative    None     Social Determinants of Health     Financial Resource Strain: Low Risk     Difficulty of Paying Living Expenses: Not hard at all   Food Insecurity: No Food Insecurity    Worried About Running Out of Food in the Last Year: Never true    Gigi of Food in the Last Year: Never true   Transportation Needs: No Transportation Needs    Lack of Transportation (Medical): No    Lack of Transportation (Non-Medical): No   Physical Activity:     Days of Exercise per Week: Not on file    Minutes of Exercise per Session: Not on file   Stress:     Feeling of Stress : Not on file   Social Connections:     Frequency of Communication with Friends and Family: Not on file    Frequency of Social Gatherings with Friends and Family: Not on file    Attends [employer] Services: Not on file    Active Member of [address] [place] [address] or Organizations: Not on file    Attends Club or Organization Meetings: Not on file    Marital Status: Not on file   Intimate Partner Violence:     Fear of Current or Ex-Partner: Not on file    Emotionally Abused: Not on file    Physically Abused: Not on file    Sexually Abused: Not on file   Housing Stability:     Unable to Pay for Housing in the Last Year: Not on file    Number of [place] in the Last Year: Not on file    Unstable Housing in the Last Year: Not on file       MEDICATIONS   Medications Prior to Admission  Not in a hospital admission. Current Medications  No current facility-administered medications for this encounter.      Current Outpatient Medications   Medication Sig Dispense Refill    predniSONE (DELTASONE) 20 MG tablet Take 40 mg by mouth daily      promethazine-dextromethorphan (PROMETHAZINE-DM) 6.25-15 MG/5ML syrup Take 5 mLs by mouth every 4 hours as needed      promethazine-dextromethorphan (PROMETHAZINE-DM) 6.25-15 MG/5ML syrup Take 5 mLs by mouth 4 times daily as needed for Cough 180 mL 0    hydrOXYzine (VISTARIL) 25 MG capsule TAKE 1 CAPSULE BY MOUTH DAILY AS NEEDED FOR ANXIETY 30 capsule 1    budesonide-formoterol (SYMBICORT) 160-4.5 MCG/ACT AERO TAKE 2 PUFFS BY MOUTH TWICE A DAY 10.2 Inhaler 5    busPIRone (BUSPAR) 5 MG tablet Take 1 tablet by mouth 2 times daily 180 tablet 1    dilTIAZem (CARDIZEM CD) 120 MG extended release capsule Take 1 capsule by mouth 2 times daily 180 capsule 1    montelukast (SINGULAIR) 10 MG tablet TAKE 1 TABLET BY MOUTH EVERY DAY AT NIGHT 90 tablet 1    traZODone (DESYREL) 50 MG tablet Take 1 tablet by mouth nightly 90 tablet 1    albuterol sulfate HFA (PROAIR HFA) 108 (90 Base) MCG/ACT inhaler TAKE 2 PUFFS BY MOUTH EVERY 6 HOURS AS NEEDED FOR WHEEZE 8.5 Inhaler 5    levothyroxine (SYNTHROID) 125 MCG tablet Take 125 mcg by mouth Daily Takes 1.5 tab on Sunday only,  1 tab all other days      calcium carbonate (OSCAL) 500 MG TABS tablet Take 500 mg by mouth daily      MULTIPLE VITAMIN PO Take 1 tablet by mouth daily. Facility-Administered Medications Ordered in Other Encounters   Medication Dose Route Frequency Provider Last Rate Last [place] Offer casirivimab ([id number]) 600 mg, imdevimab ([id number]) 600 mg in sodium chloride 0.9 % 110 mL IVPB   IntraVENous Once Maki Ramirez MD        sodium chloride flush 0.9 % injection 5-40 mL  5-40 mL IntraVENous PRN Maki Ramirez MD             Allergies  No Known Allergies    REVIEW OF SYSTEMS   10 point review of systems conducted and pertinent positives and negatives as per HPI.     PHYSICAL EXAM     Wt Readings from Last 3 Encounters:   12/08/21 162 lb (73.5 kg)   09/23/21 162 lb (73.5 kg)   06/23/21 160 lb 3.2 oz (72.7 kg)       Blood pressure 125/83, pulse 80, temperature 98.4 °F (36.9 °C), resp. rate 18, weight 162 lb (73.5 kg), SpO2 92 %, not currently breastfeeding. GEN  -Awake, alert, NAD.   EYES   -PERRL. HENT  -MM are moist.   RESP  -LS CTA equal bilat, no wheezes, rales or rhonchi. Symmetric chest movement. No respiratory distress noted. C/V  -S1/S2 auscultated, tachycardia without appreciable M/R/G. No JVD or carotid bruits. Peripheral pulses equal bilaterally and palpable. No peripheral edema. No reproducible chest wall tenderness. GI  -Abdomen is soft, non-distended, no significant tenderness. No masses or guarding. + BS in all quadrants. Rectal exam deferred.   -No CVA tenderness. Tirado catheter is not present. MS  -B/L extremities strong muscles strength. Full movements. No gross joint deformities. No swelling, intact sensation symmetrical.   SKIN  -Normal coloration, warm, dry. NEURO  - Awake, alert, oriented x 3 no focal deficits. PSYC  - Appropriate affect. LABS AND IMAGING     Results for Marko Sims (MRN [id number]) as of 12/9/2021 03:18   Ref.  Range 12/8/2021 17:30   Sodium Latest Ref Range: 135 - 145 MMOL/L 134 (L)   Potassium Latest Ref Range: 3.5 - 5.1 MMOL/L 3.8   Chloride Latest Ref Range: 99 - 110 mMol/L 98 (L)   CO2 Latest Ref Range: 21 - 32 MMOL/L 25   BUN Latest Ref Range: 6 - 23 MG/DL 11   Creatinine Latest Ref Range: 0.6 - 1.1 MG/DL 0.7   Anion Gap Latest Ref Range: 4 - 16  11   GFR Non- Latest Ref Range: >60 mL/min/1.73m2 >60   GFR African American Latest Ref Range: >60 mL/min/1.73m2 >60   Glucose Latest Ref Range: 70 - 99 MG/ (H)   Calcium Latest Ref Range: 8.3 - 10.6 MG/DL 8.3   Total Protein Latest Ref Range: 6.4 - 8.2 GM/DL 6.1 (L)   Pro-BNP Latest Ref Range: <300 PG/.1   Troponin T Latest Ref Range: <0.01 NG/ML <0.010   Albumin Latest Ref Range: 3.4 - 5.0 GM/DL 3.7   Alk Phos Latest Ref Range: 40 - 129 IU/L 49   ALT Latest Ref Range: 10 - 40 U/L 24   AST Latest Ref Range: 15 - 37 IU/L 30   Bilirubin Latest Ref Range: 0.0 - 1.0 MG/DL 0.3   WBC Latest Ref Range: 4.0 - 10.5 K/CU MM 5.1   RBC Latest Ref Range: 4.2 - 5.4 M/CU MM 4.30   Hemoglobin Quant Latest Ref Range: 12.5 - 16.0 GM/DL 13.5   Hematocrit Latest Ref Range: 37 - 47 % 39.9   MCV Latest Ref Range: 78 - 100 FL 92.8   MCH Latest Ref Range: 27 - 31 PG 31.4 (H)   MCHC Latest Ref Range: 32.0 - 36.0 % 33.8   MPV Latest Ref Range: 7.5 - 11.1 FL 10.7   RDW Latest Ref Range: 11.7 - 14.9 % 12.9   Platelet Count Latest Ref Range: 140 - 440 K/CU    Lymphocyte % Latest Ref Range: 24 - 44 % 3.0 (L)   Monocytes % Latest Ref Range: 0 - 4 % 3.0   Lymphocytes Absolute Latest Units: K/CU MM 0.2   Monocytes Absolute Latest Units: K/CU MM 0.2   Differential Type Unknown MANUAL DIFFERENTIAL   Segs Relative Latest Ref Range: 36 - 66 % 86.0 (H)   Segs Absolute Latest Units: K/CU MM 4.3   Bands Relative Latest Ref Range: 5 - 11 % 4 (L)   Bands Absolute Latest Units: K/CU MM 0.20   Metamyelocytes Relative Latest Ref Range: 0.0 % 4 (H)   Metamyelocytes Absolute Latest Units: K/CU MM 0.20   D-Dimer, Quant Latest Ref Range: <230 NG/mL(DDU) 567 (H)     Results for Juan Cameron (MRN [id number]) as of 12/9/2021 03:18   Ref.  Range 12/8/2021 23:30   pH, Shaun Latest Ref Range: 7.32 - 7.42  7.45 (H)   pCO2, Shaun Latest Ref Range: 38 - 52 mmHG 37 (L)   pO2, Shaun Latest Ref Range: 28 - 48 mmHG 51 (H)   HCO3, Venous Latest Ref Range: 19 - 25 MMOL/L 25.7 (H)   O2 Sat, Shaun Latest Ref Range: 50 - 70 % 86.1 (H)   Base Exc, Mixed Latest Ref Range: 0 - 2.3  1.8     Recent Imaging    CTA PULMONARY W CONTRAST [[id number]] Collected: 12/08/21 2247      Order Status: Completed Updated: 12/08/21 2319     Narrative:       EXAMINATION:   CTA OF THE CHEST 12/8/2021 8:55 pm     TECHNIQUE:   CTA of the chest was performed after the administration of intravenous   contrast.  Multiplanar reformatted images are provided for review.  MIP   images are provided for review. Dose modulation, iterative reconstruction,   and/or weight based adjustment of the mA/kV was utilized to reduce the   radiation dose to as low as reasonably achievable. COMPARISON:   None. HISTORY:   ORDERING SYSTEM PROVIDED HISTORY: +ddimer   TECHNOLOGIST PROVIDED HISTORY:   Reason for exam:->+ddimer   Decision Support Exception - unselect if not a suspected or confirmed   emergency medical condition->Emergency Medical Condition (MA)   Reason for Exam: +ddimer     FINDINGS:   Pulmonary Arteries: Pulmonary arteries are adequately opacified for   evaluation.  No evidence of intraluminal filling defect to suggest pulmonary   embolism.  Main pulmonary artery is normal in caliber. Mediastinum: No evidence of mediastinal lymphadenopathy.  The heart and   pericardium demonstrate no acute abnormality.  There is no acute abnormality   of the thoracic aorta. Lungs/pleura: Extensive scattered areas of patchy and ground-glass opacities   are noted throughout the bilateral lungs.  These are nonspecific but could   indicate an atypical/viral pneumonia, inclusive of COVID-19.  No pulmonary   mass or samantha consolidation.  No pleural effusion. Upper Abdomen: Several hypodense lesions are noted in the liver, the largest   measures 11 mm in the inferior right hepatic lobe. Soft Tissues/Bones: No acute bone or soft tissue abnormality.      Impression:       No evidence of pulmonary embolism. Extensive scattered areas of patchy and ground-glass opacities are noted   throughout the bilateral lungs.  These are nonspecific but could indicate an   atypical/viral pneumonia.      XR CHEST (2 VW) [[id number]] Collected: 12/08/21 1721     Order Status: Completed Specimen: Chest Updated: 12/08/21 1727     Narrative:       EXAMINATION:   TWO XRAY VIEWS OF THE CHEST     12/8/2021 1:52 pm     COMPARISON:   12/18/2014 HISTORY:   ORDERING SYSTEM PROVIDED HISTORY: shortness of breath   TECHNOLOGIST PROVIDED HISTORY:   Reason for exam:->shortness of breath   Reason for Exam: shortness of breath     FINDINGS:   Patchy infiltrates are noted within the lungs bilaterally, greatest within   the lower lungs.  No pneumothorax is identified.  Pulmonary vasculature is   congested.  The cardial pericardial silhouette is otherwise unremarkable in   appearance.  No acute bony abnormalities are detected.      Impression:       Patchy infiltrates within the lower lungs bilaterally, most compatible with   multifocal pneumonia.  Consider both typical and atypical etiologies,   including viral pneumonia. There is mild pulmonary vascular congestion, and therefore correlate with any   clinical evidence of superimposed pulmonary edema.          Relevant labs and imaging reviewed    ASSESSMENT AND PLAN     #. Acute respiratory failure with hypoxia secondary to COVID-19 pneumonia  -Patient is currently requiring 15 L of oxygen through HFNC  -Wean off of oxygen as tolerated. -Continue dexamethasone. #.  COVID-19 pneumonia  -Rapid Covid positive, patient had positive test 12/4 outpatient, symptoms since 11/30  -Patient received her first dose of COVID vaccine 10/29/2021  -Inflammatory markers ordered  -Continue IV dexamethasone 10 mg daily  -CTA chest-extensive scattered areas of patchy and groundglass opacities throughout bilateral lungs.  -Pharmacy consult for baricitinib/tocilizumab    #. Also reported several hypodense lesions noted in the liver, largest measuring 11 mm in the inferior right hepatic lobe. #. Hypertension-continue Cardizem twice daily    #. mild intermittent asthma-DuoNeb HFA as needed, Singulair nightly    #. secondary hypothyroidism (had complete thyroidectomy)  -Continue levothyroxine    #. Anxiety-continue BuSpar, hydroxyzine    #. Insomnia-trazodone.     DVT Prophylaxis: Lovenox twice daily  GI Prophylaxis: Not indicated   code Status: FULL.       Case d/w ED physician      Lakia Mcfarlane MD  Hospitalist, Internal Medicine  12/9/2021 at 12:12 AM

## 2021-12-10 NOTE — RT PROTOCOL NOTE
RT Inhaler-Nebulizer Bronchodilator Protocol Note    There is a bronchodilator order in the chart from a provider indicating to follow the RT Bronchodilator Protocol and there is an Initiate RT Inhaler-Nebulizer Bronchodilator Protocol order as well (see protocol at bottom of note). CXR Findings:  XR CHEST PORTABLE    Result Date: 12/10/2021  Patchy airspace opacities bilaterally, may be related to pulmonary edema versus pneumonia, progressed. The findings from the last RT Protocol Assessment were as follows:   History Pulmonary Disease: None or smoker <15 pack years  Respiratory Pattern: Mild dyspnea at rest, irregular pattern, or RR 21-25 bpm  Breath Sounds: Clear breath sounds  Cough: Strong, spontaneous, non-productive  Indication for Bronchodilator Therapy:    Bronchodilator Assessment Score: 4    Aerosolized bronchodilator medication orders have been revised according to the RT Inhaler-Nebulizer Bronchodilator Protocol below. Respiratory Therapist to perform RT Therapy Protocol Assessment initially then follow the protocol. Repeat RT Therapy Protocol Assessment PRN for score 0-3 or on second treatment, BID, and PRN for scores above 3. No Indications - adjust the frequency to every 6 hours PRN wheezing or bronchospasm, if no treatments needed after 48 hours then discontinue using Per Protocol order mode. If indication present, adjust the RT bronchodilator orders based on the Bronchodilator Assessment Score as indicated below. Use Inhaler orders unless patient has one or more of the following: on home nebulizer, not able to hold breath for 10 seconds, is not alert and oriented, cannot activate and use MDI correctly, or respiratory rate 25 breaths per minute or more, then use the equivalent nebulizer order(s) with same Frequency and PRN reasons based on the score. If a patient is on this medication at home then do not decrease Frequency below that used at home.     0-3 - enter or revise RT bronchodilator order(s) to equivalent RT Bronchodilator order with Frequency of every 4 hours PRN for wheezing or increased work of breathing using Per Protocol order mode. 4-6 - enter or revise RT Bronchodilator order(s) to two equivalent RT bronchodilator orders with one order with BID Frequency and one order with Frequency of every 4 hours PRN wheezing or increased work of breathing using Per Protocol order mode. 7-10 - enter or revise RT Bronchodilator order(s) to two equivalent RT bronchodilator orders with one order with TID Frequency and one order with Frequency of every 4 hours PRN wheezing or increased work of breathing using Per Protocol order mode. 11-13 - enter or revise RT Bronchodilator order(s) to one equivalent RT bronchodilator order with QID Frequency and an Albuterol order with Frequency of every 4 hours PRN wheezing or increased work of breathing using Per Protocol order mode. Greater than 13 - enter or revise RT Bronchodilator order(s) to one equivalent RT bronchodilator order with every 4 hours Frequency and an Albuterol order with Frequency of every 2 hours PRN wheezing or increased work of breathing using Per Protocol order mode. RT to enter RT Home Evaluation for COPD & MDI Assessment order using Per Protocol order mode.     Electronically signed by Oracio Webber RCP on 12/10/2021 at 2:44 PM

## 2021-12-10 NOTE — PROGRESS NOTES
[facility] HOSPITALIST PROGRESS NOTE      PCP: Onofre Samaniego MD    Date of Admission: 12/8/2021    Subjective: feels short of breath   bipap helps      [address] summary   Patient is a 50-year-old female with history of hypertension, asthma, hypothyroidism status post thyroidectomy, anxiety insomnia who was admitted with dyspnea. symptoms started on 11/30. She went to urgent care and tested positive on 12/4 for Covid, received Regeneron with her PCP on 12/8       Vitals signs:  Afebrile, heart rate 60s range, blood pressure borderline, on nonrebreather 15%, BiPAP overnight    Medications: Albuterol, Symbicort, buspirone, dexamethasone, diltiazem, Lovenox, montelukast, Tocilizumab, vitamin D    Antibiotics: None    Fluid status: Not documented    Labs:   Labs from prior reviewed    Procalcitonin 0.053    Imaging:   CXR shows patchy airspace opacities bilaterally     Assessment/Plan:     Severe sepsis secondary to COVID-19 pneumonia with acute hypoxic respiratory failure as endorgan defect  Acute on chronic COPD        On admission patient was on high flow oxygen,   placed on BiPAP, CT chest did not show any PE, patchy opacities bilaterally,   procal low, CRP elevated,   Pulmonology consult    S/p toci, monoclonal antibodies   Continue dexamethasone and PPI   barcitinib per pulmonology        Mood disorder: Buspirone     Hypothyroidism: Levothyroxine     A. fib: Continue diltiazem       DVT prophlaxis:   Lovenox     Last BM:  No since admission     Ambulation:   As tolerated     Disposition:   Home     Diet: general Diet     Physical Exam Performed:       BP 94/63   Pulse 91   Temp 98.9 °F (37.2 °C) (Axillary)   Resp 24   Wt 162 lb (73.5 kg)   SpO2 98%   BMI 26.96 kg/m²     Physical Exam  Constitutional:       General: She is not in acute distress. Appearance: Normal appearance. HENT:      Head: Normocephalic and atraumatic.       Right Ear: External ear normal.      Left Ear: External ear normal.   Eyes:      Extraocular Movements: Extraocular movements intact. Pupils: Pupils are equal, round, and reactive to light. Cardiovascular:      Rate and Rhythm: Normal rate and regular rhythm. Heart sounds: No murmur heard. Pulmonary:      Effort: Pulmonary effort is normal. No respiratory distress. Breath sounds: Normal breath sounds. No wheezing. Comments: Feel short of breath     Abdominal:      General: Bowel sounds are normal. There is no distension. Palpations: Abdomen is soft. Tenderness: There is no abdominal tenderness. Musculoskeletal:         General: No swelling. Cervical back: Normal range of motion. Skin:     General: Skin is warm. Neurological:      General: No focal deficit present. Mental Status: She is alert and oriented to person, place, and time. Cranial Nerves: No cranial nerve deficit. Psychiatric:         Mood and Affect: Mood normal.         Labs:   Recent Labs     12/08/21  1730 12/09/21  0500   WBC 5.1 6.2   HGB 13.5 13.4   HCT 39.9 39.9    144     Recent Labs     12/08/21  1730 12/09/21  0500   * 133*   K 3.8 4.2   CL 98* 97*   CO2 25 26   BUN 11 10   CREATININE 0.7 0.7   CALCIUM 8.3 8.3     Recent Labs     12/08/21  1730 12/09/21  0500   AST 30 25   ALT 24 21   BILITOT 0.3 0.2   ALKPHOS 49 52     Recent Labs     12/09/21  0500   INR 0.88     No results for input(s): [place] in the last 72 hours. Urinalysis:    No results found for: Mack Cynthia, BACTERIA, RBCUA, BLOODU, [address], [address]    Radiology:  XR CHEST PORTABLE   Final Result   Patchy airspace opacities bilaterally, may be related to pulmonary edema   versus pneumonia, progressed. CTA PULMONARY W CONTRAST   Final Result   No evidence of pulmonary embolism. Extensive scattered areas of patchy and ground-glass opacities are noted   throughout the bilateral lungs.  These are nonspecific but could indicate an

## 2021-12-10 NOTE — PROGRESS NOTES
Pulmonary and Critical Care  Progress Note      VITALS:  BP 94/63   Pulse 91   Temp 98.9 °F (37.2 °C) (Axillary)   Resp 24   Wt 162 lb (73.5 kg)   SpO2 98%   BMI 26.96 kg/m²     Subjective:   CHIEF COMPLAINT :SOB     HPI:                The patient is sitting in the bed. She is in mild resp distress    Objective:   PHYSICAL EXAM:    LUNGS:Occasional basal crackles  Abd-soft, BS+,NT  Ext- no pedal edema  CVS-s1s2, no murmurs      DATA:    CBC:  Recent Labs     12/08/21  1730 12/09/21  0500   WBC 5.1 6.2   RBC 4.30 4.29   HGB 13.5 13.4   HCT 39.9 39.9    144   MCV 92.8 93.0   MCH 31.4* 31.2*   MCHC 33.8 33.6   RDW 12.9 12.9   SEGSPCT 86.0* 75.0*   BANDSPCT 4* 11      BMP:  Recent Labs     12/08/21  1730 12/09/21  0500   * 133*   K 3.8 4.2   CL 98* 97*   CO2 25 26   BUN 11 10   CREATININE 0.7 0.7   CALCIUM 8.3 8.3   GLUCOSE 183* 156*      ABG:  No results for input(s): PH, PO2ART, NDY2KFQ, HCO3, BEART, O2SAT in the last 72 hours. BNP  No results found for: BNP   D-Dimer:  Lab Results   Component Value Date    DDIMER 600 (H) 12/09/2021      Radiology:   Patchy airspace opacities bilaterally, may be related to pulmonary edema   versus pneumonia, progressed     1. Assessment/Plan     Patient Active Problem List    Diagnosis Date Noted    Acute respiratory failure with hypoxia ([facility] [address] 12/09/2021    Pneumonia due to COVID-19 virus 12/07/2021     Overview Note:     Tested positive on 12/4/2021      Anxiety 12/28/2020     Overview Note:     Patient has anxiety. She was taking Vistaril as needed but states he still feels anxious. BuSpar 5 mg twice a day      Adjustment insomnia 09/23/2020     Overview Note:     Pt has insomnia because of anxiety and stress  Trial of trazodone 50 mg daily . Side effects explained.  Abnormal mammogram 09/24/2019     Overview Note:     9/24/19 : abnormal : repeat in 6 months.  Pt was informed by radiologist   Mammogram in 3/20 was normal.  Patient is followed by gynecologist  1/7/21 : Mammo normal at [address] Tachycardia 06/26/2018    Essential hypertension 06/26/2018     Overview Note:     Patient has hypertension. Pt is on diltiazem  mg bid       Adjustment disorder with anxious mood 03/23/2018     Overview Note:     Pt wants to try vistaril prn.  Thyroid ca ([address] 12/15/2015     Overview Note:     Had total thyroidectomy by Dr Briana Rayo. On synthroid. Managed by Dr Kings Hastings.  -he increased synthroid to 125 mcg daily except  1 1/2 tablet on Sundays  Pt has f/u appointment in 11/2021      Cough variant asthma      Overview Note:     On, albuterol and singulair. Pt is on Symbicort: feels well. Acute Hypoxic resp failure  Bilateral Pneumonia sec to COVID-19  Sec. Hypothryroidism  Anxiety       1. Tocilizumab  2. Decadron  3. Insulin  4. Inhalers  5. ICS  6. OOB  7. Keep sats > 92%  8.  C/w present management    Electronically signed by Cedric Adrian MD on 12/10/2021 at 10:08 AM

## 2021-12-10 NOTE — CARE COORDINATION
Patient admitted to [facility] unit and is on Bipap. Per medical record review patient is from home, has PCP and insurance to assist with RX coverage. Case Management to follow to assist with any potential discharge needs.

## 2021-12-11 NOTE — CONSULTS
CARDIOLOGY CONSULT NOTE   Reason for consultation:  Hypotension managemebt    Referring physician:  Bhaskar Richards  Primary care physician: Jackie Gandhi MD      Dear Bhaskar Richards  Thanks for the consult. History of present Kasi Arguelles is a 45 y. o.year old who  presents with for shortness of breath which is moderate to severe, for few days, intermittent, self limiting, not associated with cough or fever, gets worse with activity and better with rest,  Patient is diagnosed with COVID 19 PNEUMONIA, her blood pressure is running low in 90 systolic, she is on cardizem 120mg bid for HTN, cardiology consult is called for management of medication in terms of her COVID 23    Chief Complaint   Patient presents with    Shortness of Breath     covid +, pt sent over from infusion clinic due to oxygen levels below 95%. due to have covid infusion today. Past medical history:    has a past medical history of Abnormal mammogram, Cough variant asthma, COVID-19 virus infection, Depression, Migraines, Pulmonary nodules, Thyroid ca ([address] and Thyroid nodule. Past surgical history:   has a past surgical history that includes Thyroid surgery (9-25-15). Social History:   reports that she has never smoked. She has never used smokeless tobacco. She reports current alcohol use. She reports that she does not use drugs.   Family history:   no family history of CAD, STROKE of DM    No Known Allergies    0.9 % sodium chloride infusion, Continuous  albuterol sulfate  (90 Base) MCG/ACT inhaler 2 puff, BID   And  ipratropium (ATROVENT HFA) 17 MCG/ACT inhaler 2 puff, BID  sodium chloride flush 0.9 % injection 5-40 mL, 2 times per day  sodium chloride flush 0.9 % injection 5-40 mL, PRN  0.9 % sodium chloride infusion, PRN  enoxaparin (LOVENOX) injection 30 mg, BID  ondansetron (ZOFRAN-ODT) disintegrating tablet 4 mg, Q8H PRN   Or  ondansetron (ZOFRAN) injection 4 mg, Q6H PRN  polyethylene glycol (GLYCOLAX) packet 17 g, Daily PRN  acetaminophen (TYLENOL) tablet 650 mg, Q6H PRN   Or  acetaminophen (TYLENOL) suppository 650 mg, Q6H PRN  guaiFENesin-dextromethorphan (ROBITUSSIN DM) 100-10 MG/5ML syrup 5 mL, Q4H PRN  dexamethasone (PF) (DECADRON) injection 10 mg, Q24H  vitamin D CAPS 2,000 Units, Daily  albuterol sulfate  (90 Base) MCG/ACT inhaler 2 puff, Q4H PRN  budesonide-formoterol (SYMBICORT) 160-4.5 MCG/ACT inhaler 2 puff, BID  busPIRone (BUSPAR) tablet 5 mg, BID  calcium carbonate (TUMS) chewable tablet 500 mg, Daily  hydrOXYzine (VISTARIL) capsule 25 mg, TID PRN  levothyroxine (SYNTHROID) tablet 125 mcg, Daily  montelukast (SINGULAIR) tablet 10 mg, Nightly  traZODone (DESYREL) tablet 50 mg, Nightly PRN      Current Facility-Administered Medications   Medication Dose Route Frequency Provider Last Rate Last Admin    0.9 % sodium chloride infusion   IntraVENous Continuous Brent Remy  mL/hr at 12/11/21 1005 New Bag at 12/11/21 1005    albuterol sulfate  (90 Base) MCG/ACT inhaler 2 puff  2 puff Inhalation BID Emiliana Vickers MD   2 puff at 12/11/21 0845    And    ipratropium (ATROVENT HFA) 17 MCG/ACT inhaler 2 puff  2 puff Inhalation BID Emiliana Vickers MD   2 puff at 12/11/21 0845    sodium chloride flush 0.9 % injection 5-40 mL  5-40 mL IntraVENous 2 times per day Tara Thomas MD   10 mL at 12/11/21 0956    sodium chloride flush 0.9 % injection 5-40 mL  5-40 mL IntraVENous PRN Tara Thomas MD        0.9 % sodium chloride infusion  25 mL IntraVENous PRN Tara Thomas MD        enoxaparin (LOVENOX) injection 30 mg  30 mg SubCUTAneous BID Tara Thomas MD   30 mg at 12/11/21 0955    ondansetron (ZOFRAN-ODT) disintegrating tablet 4 mg  4 mg Oral Q8H PRN Tara Thomas MD        Or    ondansetron (ZOFRAN) injection 4 mg  4 mg IntraVENous Q6H PRN Tara Thomas MD        polyethylene glycol (GLYCOLAX) packet 17 g  17 g Oral Daily PRN Tara Thomas MD        acetaminophen (TYLENOL) tablet 650 mg  650 mg Oral Q6H PRN Gila Lott MD   650 mg at 12/09/21 1752    Or    acetaminophen (TYLENOL) suppository 650 mg  650 mg Rectal Q6H PRN Gila Lott MD        guaiFENesin-dextromethorphan (ROBITUSSIN DM) 100-10 MG/5ML syrup 5 mL  5 mL Oral Q4H PRN Gila Lott MD   5 mL at 12/10/21 1738    dexamethasone (PF) (DECADRON) injection 10 mg  10 mg IntraVENous Q24H Gila Lott MD   10 mg at 12/10/21 1738    vitamin D CAPS 2,000 Units  2,000 Units Oral Daily Gila Lott MD   2,000 Units at 12/11/21 1216    albuterol sulfate  (90 Base) MCG/ACT inhaler 2 puff  2 puff Inhalation Q4H PRN Gila Lott MD        budesonide-formoterol (SYMBICORT) 160-4.5 MCG/ACT inhaler 2 puff  2 puff Inhalation BID Gila Lott MD   2 puff at 12/11/21 0803    busPIRone (BUSPAR) tablet 5 mg  5 mg Oral BID Gila Lott MD   5 mg at 12/11/21 0955    calcium carbonate (TUMS) chewable tablet 500 mg  500 mg Oral Daily Gila Lott MD   500 mg at 12/11/21 8945    hydrOXYzine (VISTARIL) capsule 25 mg  25 mg Oral TID PRN Gila Lott MD   25 mg at 12/10/21 2122    levothyroxine (SYNTHROID) tablet 125 mcg  125 mcg Oral Daily Gila Lott MD   125 mcg at 12/11/21 0954    montelukast (SINGULAIR) tablet 10 mg  10 mg Oral Nightly Gila Lott MD   10 mg at 12/10/21 2122    traZODone (DESYREL) tablet 50 mg  50 mg Oral Nightly PRN Gila Lott MD   50 mg at 12/10/21 2122     Review of Systems:   · Constitutional: No Fever or Weight Loss   · Eyes: No Decreased Vision  · ENT: No Headaches, Hearing Loss or Vertigo  · Cardiovascular: No chest pain,+ dyspnea on exertion, palpitations or loss of consciousness  · Respiratory: No cough or wheezing    · Gastrointestinal: No abdominal pain, appetite loss, blood in stools, constipation, diarrhea or heartburn  · Genitourinary: No dysuria, trouble voiding, or hematuria  · Musculoskeletal:  No gait disturbance, weakness or joint complaints  · Integumentary: No rash or pruritis  · Neurological: No TIA or stroke symptoms  · Psychiatric: No anxiety or depression  · Endocrine: No malaise, fatigue or temperature intolerance  · Hematologic/Lymphatic: No bleeding problems, blood clots or swollen lymph nodes  · Allergic/Immunologic: No nasal congestion or hives  All systems negative except as marked. ·   ·      Physical Examination:    Vitals:    12/11/21 1356   BP: 101/75   Pulse: 78   Resp: 17   Temp: 97.7 °F (36.5 °C)   SpO2: 93%      Wt Readings from Last 3 Encounters:   12/08/21 162 lb (73.5 kg)   09/23/21 162 lb (73.5 kg)   06/23/21 160 lb 3.2 oz (72.7 kg)     Body mass index is 26.96 kg/m². General Appearance:  No distress, conversant    Constitutional:  Well developed, Well nourished, No acute distress, Non-toxic appearance. HENT:  Normocephalic, Atraumatic, Bilateral external ears normal, Oropharynx moist, No oral exudates, Nose normal. Neck- Normal range of motion, No tenderness, Supple, No stridor,no apical-carotid delay, no carotid bruit  Eyes:  PERRL, EOMI, Conjunctiva normal, No discharge. Respiratory:  Normal breath sounds, No respiratory distress, No wheezing, No chest tenderness. ,no use of accessory muscles, diaphragm movement is normal  Cardiovascular: (PMI) apex non displaced,no lifts no thrills, no s3,no s4, Normal heart rate, Normal rhythm, No murmurs, No rubs, No gallops. Carotid arteries pulse and amplitude are normal no bruit, no abdominal bruit noted ( normal abdominal aorta ausculation), femoral arteries pulse and amplitude are normal no bruit, pedal pulses are normal  GI:  Bowel sounds normal, Soft, No tenderness, No masses, No pulsatile masses, no hepatosplenomegally, no bruits  : External genitalia appear normal, No masses or lesions. No discharge. No CVA tenderness.    Musculoskeletal:  Intact distal pulses, No edema, No tenderness, No cyanosis, No clubbing. Good range of motion in all major joints. No tenderness to palpation or major deformities noted. Back- No tenderness. Integument:  Warm, Dry, No erythema, No rash. Skin: no rash, no ulcers  Lymphatic:  No lymphadenopathy noted. Neurologic:  Alert & oriented x 3, Normal motor function, Normal sensory function, No focal deficits noted. Psychiatric:  Affect normal, Judgment normal, Mood normal.   Lab Review   Recent Labs     12/09/21  0500   WBC 6.2   HGB 13.4   HCT 39.9         Recent Labs     12/09/21  0500   *   K 4.2   CL 97*   CO2 26   BUN 10   CREATININE 0.7     Recent Labs     12/09/21  0500   AST 25   ALT 21   BILITOT 0.2   ALKPHOS 52     No results for input(s): TROPONINI in the last 72 hours. No results found for: BNP  Lab Results   Component Value Date    INR 0.88 12/09/2021    PROTIME 11.4 (L) 12/09/2021         EKG:nsr    Chest Xray:b/l air space disease    ECHO:pending  Labs, echo, meds reviewed  Assessment: 45 y. o.year old with PMH of  has a past medical history of Abnormal mammogram, Cough variant asthma, COVID-19 virus infection, Depression, Migraines, Pulmonary nodules, Thyroid ca ([address] and Thyroid nodule. Recommendations:    1. HYPOTENSION: no history of PAF, she takes cardizem for hypertension, will hold it for now and start IVF, echo ordered  2. COVID 19: RECEIVED regeneron, continue steroids and antibiotics as per pulmonary  3. Hypothyroidism after thyroidectomy: recomemnd to replace T4  All labs, medications and tests reviewed, continue all other medications of all above medical condition listed as is.          Ángel Mars MD, 12/11/2021 4:40 PM       Full consult note to follow, cardizem d.carmen, IVF ordered, will follow up, she may need dig

## 2021-12-11 NOTE — PROGRESS NOTES
Pulmonary and Critical Care  Progress Note      VITALS:  BP 99/72   Pulse 67   Temp 98.2 °F (36.8 °C) (Axillary)   Resp 22   Wt 162 lb (73.5 kg)   SpO2 93%   BMI 26.96 kg/m²     Subjective:   CHIEF COMPLAINT :SOB     HPI:                The patient is lying in the bed. She is in mild resp distress. She has afib. She is on 80% fio2    Objective:   PHYSICAL EXAM:    LUNGS:Occasional basal crackles  Abd-soft, BS+,NT  Ext- no pedal edema  CVS-s1s2, no murmurs      DATA:    CBC:  Recent Labs     12/08/21  1730 12/09/21  0500   WBC 5.1 6.2   RBC 4.30 4.29   HGB 13.5 13.4   HCT 39.9 39.9    144   MCV 92.8 93.0   MCH 31.4* 31.2*   MCHC 33.8 33.6   RDW 12.9 12.9   SEGSPCT 86.0* 75.0*   BANDSPCT 4* 11      BMP:  Recent Labs     12/08/21  1730 12/09/21  0500   * 133*   K 3.8 4.2   CL 98* 97*   CO2 25 26   BUN 11 10   CREATININE 0.7 0.7   CALCIUM 8.3 8.3   GLUCOSE 183* 156*      ABG:  No results for input(s): PH, PO2ART, COQ1EGH, HCO3, BEART, O2SAT in the last 72 hours. BNP  No results found for: BNP   D-Dimer:  Lab Results   Component Value Date    DDIMER 544 (H) 12/10/2021      1. Radiology: None      Assessment/Plan     Patient Active Problem List    Diagnosis Date Noted    Acute respiratory failure with hypoxia ([facility] [address] 12/09/2021    Pneumonia due to COVID-19 virus 12/07/2021     Overview Note:     Tested positive on 12/4/2021      Anxiety 12/28/2020     Overview Note:     Patient has anxiety. She was taking Vistaril as needed but states he still feels anxious. BuSpar 5 mg twice a day      Adjustment insomnia 09/23/2020     Overview Note:     Pt has insomnia because of anxiety and stress  Trial of trazodone 50 mg daily . Side effects explained.  Abnormal mammogram 09/24/2019     Overview Note:     9/24/19 : abnormal : repeat in 6 months.  Pt was informed by radiologist   Mammogram in 3/20 was normal.  Patient is followed by gynecologist  1/7/21 : [facility]pe [facility]lvarez at [facility] Tachycardia 06/26/2018    Essential hypertension 06/26/2018     Overview Note:     Patient has hypertension. Pt is on diltiazem  mg bid       Adjustment disorder with anxious mood 03/23/2018     Overview Note:     Pt wants to try vistaril prn.  Thyroid ca ([address] 12/15/2015     Overview Note:     Had total thyroidectomy by Dr Dana Sandhu. On synthroid. Managed by Dr Anjali Linder.  -he increased synthroid to 125 mcg daily except  1 1/2 tablet on Sundays  Pt has f/u appointment in 11/2021      Cough variant asthma      Overview Note:     On, albuterol and singulair. Pt is on Symbicort: feels well. Acute Hypoxic resp failure  Bilateral Pneumonia sec to COVID-19  Sec. Hypothryroidism  Anxiety  Afib       1. Afib rate control  2. Topcilizumab  3. Decadron  4. Insulin  5. Inhalers  6. BIPAP  7. Keep sats >88%  8. ICS  9. OOB  10. Prone ventilation as tolerated  11. CXR in am  15.  C/w present management    Electronically signed by Reed Delgadillo MD on 12/11/2021 at 11:59 AM

## 2021-12-11 NOTE — PROGRESS NOTES
12/11/21 0054   NIV Type   NIV Started/Stopped On   Equipment Type v60   Mode Bilevel   Mask Type Full face mask   Mask Size Medium   Bonnet size Medium   Settings/Measurements   IPAP 12 cmH20   CPAP/EPAP 6 cmH2O   Rate Ordered 12   Resp (!) 31   FiO2  80 %   I Time/ I Time % 1 s   Vt Exhaled 602 mL   Minute Volume 19.4 Liters   Mask Leak (lpm) 0 lpm   Comfort Level Good   Using Accessory Muscles No   SpO2 100   Patient Observation   Observations RRT decreased O2 to 75%   Alarm Settings   Alarms On Y   Press Low Alarm 3 cmH2O   High Pressure Alarm 20 cmH2O   Delay Alarm 20 sec(s)   Apnea (secs) 20 secs   Resp Rate Low Alarm 13   High Respiratory Rate 40 br/min

## 2021-12-11 NOTE — PROGRESS NOTES
Kallie Cummins MD, [address]                Internal Medicine Hospitalist             Daily Progress  Note   Subjective:     Chief Complaint   Patient presents with    Shortness of Breath     covid +, pt sent over from infusion clinic due to oxygen levels below 95%. due to have covid infusion today. Ms. Olu Carlson of nil, is on the Covid floor on BiPAP. Objective:    /69   Pulse 73   Temp 98.9 °F (37.2 °C) (Axillary)   Resp (!) 31   Wt 162 lb (73.5 kg)   SpO2 (!) 87%   BMI 26.96 kg/m²    No intake or output data in the 24 hours ending 12/11/21 0816   Physical Exam:  Heart: Distant heart sounds  Lungs: Distant lung sounds difficult to evaluate due to poor quality stethoscope and wearing a PAPR. Abdomen: Soft, non distended. Bowel sounds not appreciated. No obvious liver or spleen enlargement. Non tender, no rebound noted. Extremities: Non tender, no swelling noted, strength 5/5 both legs. CNS: Grossly intact.     Labs:  CBC with Differential:    Lab Results   Component Value Date    WBC 6.2 12/09/2021    RBC 4.29 12/09/2021    HGB 13.4 12/09/2021    HCT 39.9 12/09/2021     12/09/2021    MCV 93.0 12/09/2021    MCH 31.2 12/09/2021    MCHC 33.6 12/09/2021    RDW 12.9 12/09/2021    SEGSPCT 75.0 12/09/2021    BANDSPCT 11 12/09/2021    LYMPHOPCT 8.0 12/09/2021    MONOPCT 6.0 12/09/2021    BASOPCT 0.7 06/20/2019    MONOSABS 0.4 12/09/2021    LYMPHSABS 0.5 12/09/2021    EOSABS 0.1 06/20/2019    BASOSABS 0.0 06/20/2019    DIFFTYPE MANUAL DIFFERENTIAL 12/09/2021     CMP:    Lab Results   Component Value Date     12/09/2021    K 4.2 12/09/2021    CL 97 12/09/2021    CO2 26 12/09/2021    BUN 10 12/09/2021    CREATININE 0.7 12/09/2021    GFRAA >60 12/09/2021    AGRATIO 2.1 09/23/2020    LABGLOM >60 12/09/2021    GLUCOSE 156 12/09/2021    PROT 6.7 12/09/2021    LABALBU 3.6 12/09/2021    CALCIUM 8.3 12/09/2021    BILITOT 0.2 12/09/2021    ALKPHOS 52 12/09/2021    AST 25 12/09/2021    ALT 21 12/09/2021     Recent Labs     12/08/21  1730 12/09/21  0500   TROPONINT <0.010 <0.010     Lab Results   Component Value Date    TSHHS 0.350 11/09/2020         sodium chloride        albuterol sulfate HFA  2 puff Inhalation BID    And    ipratropium  2 puff Inhalation BID    sodium chloride flush  5-40 mL IntraVENous 2 times per day    enoxaparin  30 mg SubCUTAneous BID    dexamethasone  10 mg IntraVENous Q24H    vitamin D  2,000 Units Oral Daily    budesonide-formoterol  2 puff Inhalation BID    busPIRone  5 mg Oral BID    calcium carbonate  500 mg Oral Daily    dilTIAZem  120 mg Oral BID    levothyroxine  125 mcg Oral Daily    montelukast  10 mg Oral Nightly         Assessment:       Patient Active Problem List    Diagnosis Date Noted    [facility] 12/09/2021    Pneumonia due to COVID-19 virus 12/07/2021    Anxiety 12/28/2020    Adjustment insomnia 09/23/2020    Abnormal mammogram 09/24/2019    Tachycardia 06/26/2018    Essential hypertension 06/26/2018    Adjustment disorder with anxious mood 03/23/2018    Thyroid ca ([facility] 12/15/2015    Cough variant asthma        Plan:     Problems being addressed this admission:   Severe sepsis secondary to COVID-19 pneumonia / acute hypoxic respiratory failure / Acute on chronic COPD  12/10/2021 On admission patient was on high flow oxygen,   placed on BiPAP, CT chest did not show any PE, patchy opacities bilaterally,   procal low, CRP elevated, Pulmonology consult S/p toci, monoclonal antibodies   Continue dexamethasone and PPI  barcitinib per pulmonology   12/11/2021 patient's oxygenation still borderline on BiPAP, continues to be followed by pulmonary above treatment plan continues. Continue to monitor.   Her labs are not back from today further recommendations once they are back.     Mood disorder:   12/10/2021 buspirone     Hypothyroidism:   12/10/2021 levothyroxine     PAF   12/10/2021 continue diltiazem  12/11/2021 patient is on Cardizem 120 mg twice a day seems like an odd dose will consult cardiology for her may need to hold it for now as her blood pressure is marginal as per last EKG not in atrial fib has some sinus tach. She probably has paroxysmal atrial fib.      Consultants:  Pulmonary  Cardiology    General Orders:  Repeat basic labs again in am.  I have explained to the patient and discussed with him/her the treatment plan. The above chart was generated partly using Dragon dictation system, it may contain dictation errors given the limitations of this technology.      Sapna Ren MD, Barak Wells

## 2021-12-12 NOTE — PROGRESS NOTES
Today's plan:  Echo pending, contineu care as per COVID 19 standpoint, DC IVF      Admit Date:  12/8/2021    Subjective: on 100% NRB      Chief complaints on admission  Chief Complaint   Patient presents with    Shortness of Breath     covid +, pt sent over from infusion clinic due to oxygen levels below 95%. due to have covid infusion today. History of present Eric Aldana is a 45 y. o.year old who  presents with had concerns including Shortness of Breath (covid +, pt sent over from infusion clinic due to oxygen levels below 95%. due to have covid infusion today. ). Past medical history:    has a past medical history of Abnormal mammogram, Cough variant asthma, COVID-19 virus infection, Depression, Migraines, Pulmonary nodules, Thyroid ca ([address] and Thyroid nodule. Past surgical history:   has a past surgical history that includes Thyroid surgery (9-25-15). Social History:   reports that she has never smoked. She has never used smokeless tobacco. She reports current alcohol use. She reports that she does not use drugs. Family history:  family history is not on file. No Known Allergies      Objective:   /66   Pulse 65   Temp 97.8 °F (36.6 °C) (Axillary)   Resp 22   Wt 162 lb (73.5 kg)   SpO2 99%   BMI 26.96 kg/m²       Intake/Output Summary (Last 24 hours) at 12/12/2021 1648  Last data filed at 12/12/2021 0234  Gross per 24 hour   Intake --   Output 200 ml   Net -200 ml       TELEMETRY: Sinus     Physical Exam:  Constitutional:  Well developed, Well nourished, No acute distress, Non-toxic appearance. HENT:  Normocephalic, Atraumatic, Bilateral external ears normal, Oropharynx moist, No oral exudates, Nose normal. Neck- Normal range of motion, No tenderness, Supple, No stridor. Eyes:  PERRL, EOMI, Conjunctiva normal, No discharge. Respiratory:  Normal breath sounds, No respiratory distress, No wheezing, No chest tenderness. ,no use of accessory muscles, diaphragm movement is normal  Cardiovascular: (PMI) apex non displaced,no lifts no thrills, no s3,no s4, Normal heart rate, Normal rhythm, No murmurs, No rubs, No gallops. Carotid arteries pulse and amplitude are normal no bruit, no abdominal bruit noted ( normal abdominal aorta ausculation), femoral arteries pulse and amplitude are normal no bruit, pedal pulses are normal  GI:  Bowel sounds normal, Soft, No tenderness, No masses, No pulsatile masses. : External genitalia appear normal, No masses or lesions. No discharge. No CVA tenderness. Musculoskeletal:  Intact distal pulses, No edema, No tenderness, No cyanosis, No clubbing. Good range of motion in all major joints. No tenderness to palpation or major deformities noted. Back- No tenderness. Integument:  Warm, Dry, No erythema, No rash. Lymphatic:  No lymphadenopathy noted. Neurologic:  Alert & oriented x 3, Normal motor function, Normal sensory function, No focal deficits noted.    Psychiatric:  Affect normal, Judgment normal, Mood normal.     Medications:    albuterol sulfate HFA  2 puff Inhalation BID    And    ipratropium  2 puff Inhalation BID    sodium chloride flush  5-40 mL IntraVENous 2 times per day    enoxaparin  30 mg SubCUTAneous BID    dexamethasone  10 mg IntraVENous Q24H    vitamin D  2,000 Units Oral Daily    budesonide-formoterol  2 puff Inhalation BID    busPIRone  5 mg Oral BID    calcium carbonate  500 mg Oral Daily    levothyroxine  125 mcg Oral Daily    montelukast  10 mg Oral Nightly      sodium chloride 100 mL/hr at 12/12/21 0622    sodium chloride       sodium chloride flush, sodium chloride, ondansetron **OR** ondansetron, polyethylene glycol, acetaminophen **OR** acetaminophen, guaiFENesin-dextromethorphan, albuterol sulfate HFA, hydrOXYzine, traZODone    Lab Data:  CBC:   Recent Labs     12/12/21  0541   WBC 9.0   HGB 13.4   HCT 40.2   MCV 94.1        BMP:   Recent Labs     12/12/21  0541      K 5.0      CO2 24 BUN 16   CREATININE 0.7     LIVER PROFILE: No results for input(s): AST, ALT, LIPASE, BILIDIR, BILITOT, ALKPHOS in the last 72 hours. Invalid input(s): AMYLASE,  ALB  PT/INR: No results for input(s): PROTIME, INR in the last 72 hours. APTT: No results for input(s): APTT in the last 72 hours. BNP:  No results for input(s): BNP in the last 72 hours. TROPONIN: @TROPONINI:3@      Assessment:  45 y. o.year old who is admitted for          Plan:  ECHO PENDING  covid 23' Still requires 100% NRB  Hypotension: stable, dc IVF  All labs, medications and tests reviewed, continue all other medications of all above medical condition listed as is.       Mercy Valdivia MD, MD 12/12/2021 4:48 PM

## 2021-12-12 NOTE — PROGRESS NOTES
Pulmonary and Critical Care  Progress Note      VITALS:  /66   Pulse 65   Temp 97.8 °F (36.6 °C) (Axillary)   Resp 22   Wt 162 lb (73.5 kg)   SpO2 99%   BMI 26.96 kg/m²     Subjective:   CHIEF COMPLAINT :SOB     HPI:                The patient is lying in the bed. She is in mild resp distress. She is still on high fio2. Objective:   PHYSICAL EXAM:    LUNGS:Occasional basal crackles  Abd-soft, BS+,NT  Ext- no pedal edema  CVS-s1s2, no murmurs      DATA:    CBC:  Recent Labs     12/12/21  0541   WBC 9.0   RBC 4.27   HGB 13.4   HCT 40.2      MCV 94.1   MCH 31.4*   MCHC 33.3   RDW 12.1   SEGSPCT 81.0*   BANDSPCT 3*      BMP:  Recent Labs     12/12/21  0541      K 5.0      CO2 24   BUN 16   CREATININE 0.7   CALCIUM 7.8*   GLUCOSE 154*      ABG:  No results for input(s): PH, PO2ART, OHH7LMU, HCO3, BEART, O2SAT in the last 72 hours. BNP  No results found for: BNP   D-Dimer:  Lab Results   Component Value Date    DDIMER 544 (H) 12/10/2021      Radiology:   Subtle bilateral pulmonary infiltrates, right greater than left, without   acute interval change     1. Assessment/Plan     Patient Active Problem List    Diagnosis Date Noted    Acute respiratory failure with hypoxia ([facility] 12/09/2021    Pneumonia due to COVID-19 virus 12/07/2021     Overview Note:     Tested positive on 12/4/2021      Anxiety 12/28/2020     Overview Note:     Patient has anxiety. She was taking Vistaril as needed but states he still feels anxious. BuSpar 5 mg twice a day      Adjustment insomnia 09/23/2020     Overview Note:     Pt has insomnia because of anxiety and stress  Trial of trazodone 50 mg daily . Side effects explained.  Abnormal mammogram 09/24/2019     Overview Note:     9/24/19 : abnormal : repeat in 6 months.  Pt was informed by radiologist   Mammogram in 3/20 was normal.  Patient is followed by gynecologist  1/7/21 : Fox Ramirez normal at [address] Tachycardia 06/26/2018    Essential hypertension 06/26/2018     Overview Note:     Patient has hypertension. Pt is on diltiazem  mg bid       Adjustment disorder with anxious mood 03/23/2018     Overview Note:     Pt wants to try vistaril prn.  Thyroid ca ([address] 12/15/2015     Overview Note:     Had total thyroidectomy by Dr Dana Sandhu. On synthroid. Managed by Dr Anjali Linder.  -he increased synthroid to 125 mcg daily except  1 1/2 tablet on Sundays  Pt has f/u appointment in 11/2021      Cough variant asthma      Overview Note:     On, albuterol and singulair. Pt is on Symbicort: feels well. Acute Hypoxic resp failure  Bilateral Pneumonia sec to COVID-19  Sec. Hypothryroidism  Anxiety  Afib rate controlled       1. Decadron  2. Insulin  3. Inhalers  4. Keep sats > 92%  5. BIPAP  6. ICS  7. OOB  8. Prone ventilation as tolerated  9.  C/w present management    Electronically signed by Reed Delgadillo MD on 12/12/2021 at 1:35 PM

## 2021-12-12 NOTE — PROGRESS NOTES
flow oxygen over 15 L/min overall looking better from yesterday continue with the present management plan.     Mood disorder:   12/10/2021 buspirone     Hypothyroidism:   12/10/2021 levothyroxine     PAF ? / Hypotension  12/10/2021 continue diltiazem  12/11/2021 patient is on Cardizem 120 mg twice a day seems like an odd dose will consult cardiology for her may need to hold it for now as her blood pressure is marginal as per last EKG not in atrial fib has some sinus tach. She probably has paroxysmal atrial fib.   12/12/2021 patient was seen by cardiology who does not think she has atrial fibrillation Cardizem was held then started on IV fluids and echo ordered. Getting her down her IV fluids to 75 cc an hour is afraid she might going to failure.     Consultants:  Pulmonary  Cardiology    General Orders:  Repeat basic labs again in am.  I have explained to the patient and discussed with him/her the treatment plan. The above chart was generated partly using Dragon dictation system, it may contain dictation errors given the limitations of this technology.      Katty Fuchs MD, [address]

## 2021-12-12 NOTE — PROGRESS NOTES
12/12/21 0727   Oxygen Therapy/Pulse Ox   O2 Therapy Oxygen humidified   $Oxygen $Daily Charge   O2 Device High flow nasal cannula   O2 Flow Rate (L/min) 14 L/min  (100NRB)   Resp 22   SpO2 99 %   $Pulse Oximeter $Spot check (multiple/continuous)

## 2021-12-13 NOTE — CONSULTS
Infectious Disease Consult Note  2021   Patient Name: Елена Ordonez : 1970   Impression  Severe COVID-19 pneumonia with acute hypoxic respiratory failure. CRP was markedly elevated on admission at 178.4, down to 89.4 on 12/10/2021. Procalcitonin was not elevated 0.053  Vaccinated:received one dose of Pfizer COVID vaccine dose on 10/29/2021  Date of symptom onset: 2021  Date of positive SARS-CoV-2 NAAT/PCR: 2021  Exposure: unknown  Oxygen supplementation:NRBM  Bacterial infection, at present unlikely, but will still need to rule this out  BMI:26.96 kg/m2;   Quantiferon TB test is indeterminate, at low risk for TB  COVID-associated Coagulopathy  Elevated D-dimer   Multi-morbidity: per PMHx  Plan:   Therapeutic: on dexamethasone. Received tocilizumab on 2021   Diagnostic: trend CRP and pct   F/u test:     Thank you for allowing me to consult in the care of this patient.  ------------------------  REASON FOR CONSULT: Infective syndrome   Requested by: Dr. Kaitlyn Freitas is a 45 y.o.  female with a medical history of anxiety/depression, hypertension, mild intermittent asthma thyroid cancer status post total thyroidectomy, received 1 dose of the [address] vaccine on 10/29/2021 who was admitted 2021 for further evaluation and management of shortness of breath. She reported that her symptoms started on 2021. She had fevers, chills, cough, vomiting or diarrhea. She tested positive for COVID-19 on 2021. Primary care provider arranged for her to get the Regeneron anti-SARS-CoV-2 monoclonal antibody on 2021. However, at the infusion center she was seen to have an oxygen saturation of 89% on ambient air. Oxygen needs were increased in the emergency department and she is on high flow nasal oxygen 12 L/min. CT of the chest did not show any pulmonary embolus, however, there are patchy groundglass opacities noted in both lungs.   A clinical diagnosis of acute hypoxic respiratory failure secondary to severe COVID-19 pneumonia was made. Dexamethasone was started. ? Infectious diseases service was consulted to evaluate the pt, and recommend further investigative and therapeutic measures. Review and summary of old records:  ROS: Other systems reviewed Including eyes, ENT, respiratory, cardiovascular, GI, , dermatologic, neurologic, psych, hem/lymphatic, musculoskeletal and endocrine were negative other than what is mentioned above. Patient Active Problem List    Diagnosis Date Noted    Acute respiratory failure with hypoxia ([address] 12/09/2021    Pneumonia due to COVID-19 virus 12/07/2021    Anxiety 12/28/2020    Adjustment insomnia 09/23/2020    Abnormal mammogram 09/24/2019    Tachycardia 06/26/2018    Essential hypertension 06/26/2018    Adjustment disorder with anxious mood 03/23/2018    [facility] 12[facility]2015    Cough variant asthma      Past Medical History:   Diagnosis Date    Abnormal mammogram 9/24/2019 9/24/19 : abnormal : repeat in 6 months. Pt was informed by radiologist     Cough variant asthma     COVID-19 virus infection 12/7/2021    Tested positive on 12/4/2021    Depression     resolved   Blondell Roughen     Dr. Maida Deluca 12-     Pulmonary nodules     f/u by Dr. Dahiana Cox. Benign. Last CT 1/12 stable middle lobe nodules.  [facility] 12/15[facility]    Had total thyroidectomy by Dr Bebo Garcia. On synthroid. Managed by Dr Ambrose Moritz.  Thyroid nodule     seen Dr. Ambrose Moritz, bx left nodule was neg last seen 6/13 US q year Nodule increased in size per pt. He is planning to do bx next year Had thyroid nodule biopsy on 8/4/2015. Nodule was 2.6 cm left inferior nodule Pt underwent total thyroidectomy in 9/25/15      Past Surgical History:   Procedure Laterality Date    THYROID SURGERY  9-25-15    total thyroidectomy      History reviewed. No pertinent family history.    Infectious disease related family history - not contibutory. SOCIAL HISTORY  Social History     Tobacco Use    Smoking status: Never Smoker    Smokeless tobacco: Never Used   Substance Use Topics    Alcohol use: Yes     Alcohol/week: 0.0 standard drinks       Born:  [address] Occupation:   No recent travel of significance.  No recent unusual exposures.  NO pets    ? ALLERGIES  No Known Allergies   MEDICATIONS  Reviewed and are per the chart/EMR. IMMUNIZATION HISTORY  Immunization History   Administered Date(s) Administered    COVID-19, Pfizer, PF, 30mcg/0.3mL 10/29/2021     ? Antibiotics:   ?  -------------------------------------------------------------------------------------------------------------------    Vital Signs:  Vitals:    12/13/21 0851   BP:    Pulse:    Resp: 17   Temp:    SpO2: (!) 88%         Exam:    VS: noted; wt 73.5 kg/m2  Gen: alert and oriented X3, on NRBM  Skin: no stigmata of endocarditis  Wounds: C/D/I  HEMT: AT/NC Oropharynx pink, moist, and without lesions or exudates; dentition in good state of repair  Eyes: PERRLA, EOMI, conjunctiva pink, sclera anicteric. Neck: Supple. Trachea midline. No LAD. Chest: reduced air entry   Heart: RRR and no MRG. Abd: soft, non-distended, no tenderness, no hepatomegaly. Normoactive bowel sounds. Ext: no clubbing, cyanosis, or edema  Catheter Site: without erythema or tenderness  LDA:   Neuro: Mental status intact. CN 2-12 intact and no focal sensory or motor deficits    ? Diagnostic Studies: reviewed  ? ? I have examined this patient and available medical records on this date and have made the above observations, conclusions and recommendations.   Electronically signed by: Electronically signed by Satish Hobbs MD on 12/13/2021 at 10:41 AM

## 2021-12-13 NOTE — PROGRESS NOTES
Hospitalist Progress Note      PCP: Jackie Gandhi MD    Date of Admission: 2021    Chief Complaint on Admission: SOB    Pt Seen/Examined and Chart Reviewed. Admitting dx covid pneumonia    SUBJECTIVE:     Very SOB, on 15L NC, SBP 80's, pt dizzy when ambulated to bathroom      OBJECTIVE:   Vitals    TEMPERATURE:  Current - Temp: 98.2 °F (36.8 °C); Max - Temp  Av.2 °F (36.8 °C)  Min: 98.2 °F (36.8 °C)  Max: 98.2 °F (36.8 °C)  RESPIRATIONS RANGE: Resp  Av  Min: 17  Max: 29  PULSE RANGE: Pulse  Av  Min: 57  Max: 70  BLOOD PRESSURE RANGE:  Systolic (72SOO), KQT:89 , Min:88 , DHR:71   ; Diastolic (86FZN), UTT:50, Min:55, Max:55    PULSE OXIMETRY RANGE: SpO2  Av.2 %  Min: 86 %  Max: 95 %  24HR INTAKE/OUTPUT:  No intake or output data in the 24 hours ending 21 1700    Exam:    General appearance: Well, no apparent distress, appears stated age and cooperative. HEENT Normal cephalic, atraumatic without obvious deformity. Pupils equal, round, and reactive to light. Extra ocular muscles intact. Conjunctivae/corneas clear. Neck: Supple, No jugular venous distention/bruits. Trachea midline without thyromegaly or adenopathy with full range of motion. Lungs: bilateral rales, moderate resp distress, no wheezing  Heart: Regular rate and rhythm with Normal S1/S2 without  murmurs, rubs or gallops, point of maximum impulse non-displaced  Abdomen: Soft, non-tender or non-distended without rigidity or guarding and positive bowel sounds all four quadrants. Extremities: No clubbing, cyanosis, or edema bilaterally. Full range of motion without deformity and normal gait intact. Skin: Skin color, texture, turgor normal. No rashes or lesions. Neurologic: Alert and oriented X 3,  neurovascularly intact with sensory/motor intact upper extremities/lower extremities, bilaterally. Cranial nerves:II-XII intact, grossly non-focal.  Mental status: Alert, oriented, thought content appropriate.       Data Recent Labs     12/12/21  0541 12/13/21  1130   WBC 9.0 17.1*   HGB 13.4 14.5   HCT 40.2 43.2    244      Recent Labs     12/12/21  0541 12/13/21  1130    133*   K 5.0 4.0    98*   CO2 24 24   BUN 16 15   CREATININE 0.7 0.7     No results for input(s): AST, ALT, ALB, BILIDIR, BILITOT, ALKPHOS in the last 72 hours. No results for input(s): INR in the last 72 hours. No results for input(s): CKTOTAL, CKMB, CKMBINDEX, TROPONINI in the last 72 hours. Imaging/Test Results            Consults:     IP CONSULT TO HOSPITALIST  IP CONSULT TO PULMONOLOGY  IP CONSULT TO CARDIOLOGY  IP CONSULT TO INFECTIOUS DISEASES    Allergies  Patient has no known allergies.     Medications      Scheduled Meds:   midodrine  5 mg Oral TID WC    albuterol sulfate HFA  2 puff Inhalation BID    And    ipratropium  2 puff Inhalation BID    sodium chloride flush  5-40 mL IntraVENous 2 times per day    enoxaparin  30 mg SubCUTAneous BID    dexamethasone  10 mg IntraVENous Q24H    vitamin D  2,000 Units Oral Daily    budesonide-formoterol  2 puff Inhalation BID    busPIRone  5 mg Oral BID    calcium carbonate  500 mg Oral Daily    levothyroxine  125 mcg Oral Daily    montelukast  10 mg Oral Nightly       Infusions:   sodium chloride         PRN Meds:  benzonatate, sodium chloride flush, sodium chloride, ondansetron **OR** ondansetron, polyethylene glycol, acetaminophen **OR** acetaminophen, guaiFENesin-dextromethorphan, albuterol sulfate HFA, hydrOXYzine, traZODone    ASSESSMENT AND PLAN      Active Hospital Problems    Diagnosis Date Noted    Acute respiratory failure with hypoxia (HCC) [J96.01] 12/09/2021    Pneumonia due to COVID-19 virus [U07.1, J12.82] 12/07/2021       Problems being addressed this admission:   Severe sepsis secondary to COVID-19 pneumonia / acute hypoxic respiratory failure / Acute on chronic COPD  12/10/2021 On admission patient was on high flow oxygen, placed on BiPAP, CT chest did not show any PE, patchy opacities bilaterally, procal low, CRP elevated, Pulmonology consult S/p toci, monoclonal antibodies Continue dexamethasone and PPI   12/11/2021 patient's oxygenation still borderline on BiPAP, continues to be followed by pulmonary above treatment plan continues.    12/12/2021 patient is off the BiPAP and also on high flow oxygen over 15 L/min overall looking better from yesterday continue with the present management plan. Pt on 15L NC, will check echo and CXR, wean oxygen as tolerated, quantiferon indeterminate, ID consulted     Mood disorder:   12/10/2021 buspirone     Hypothyroidism:   12/10/2021 levothyroxine     PAF ? / Hypotension  12/10/2021 continue diltiazem  12/11/2021 patient is on Cardizem 120 mg twice a day seems like an odd dose will consult cardiology for her may need to hold it for now as her blood pressure is marginal as per last EKG not in atrial fib has some sinus tach.  She probably has paroxysmal atrial fib.   12/12/2021 patient was seen by cardiology who does not think she has atrial fibrillation Cardizem was held then started on IV fluids and echo ordered. Getting her down her IV fluids to 75 cc an hour is afraid she might going to failure. H/o HTN--was on dilt, stopped d/t hypotension, started on midodrine by cardio, monitor BP, encouraged PO fluids               PT/OT Eval Status: deferred    DVT Prophylaxis: SQ lovenox  Diet: ADULT DIET;  Regular  Code Status: Full Code      Dispo - echo with EF 55%, on 15L NC, f/u ID consult recs, wean oxygen as tolerated    Aureloi Betancourt MD

## 2021-12-13 NOTE — PROGRESS NOTES
Pulmonary and Critical Care  Progress Note      VITALS:  BP (!) 88/55   Pulse 70   Temp 98.2 °F (36.8 °C) (Axillary)   Resp 17   Wt 162 lb (73.5 kg)   SpO2 (!) 88%   BMI 26.96 kg/m²     Subjective:   CHIEF COMPLAINT :SOB     HPI:                The patient is lying in the bed. She is on high flow. She is in mild resp distress    Objective:   PHYSICAL EXAM:    LUNGS:Occasional basal crackles  Abd-soft, BS+,NT  Ext- no pedal edema  CVS-s1s2, no murmurs      DATA:    CBC:  Recent Labs     12/12/21  0541 12/13/21  1130   WBC 9.0 17.1*   RBC 4.27 4.67   HGB 13.4 14.5   HCT 40.2 43.2    244   MCV 94.1 92.5   MCH 31.4* 31.0   MCHC 33.3 33.6   RDW 12.1 12.0   SEGSPCT 81.0* 90.8*   BANDSPCT 3*  --       BMP:  Recent Labs     12/12/21  0541 12/13/21  1130    133*   K 5.0 4.0    98*   CO2 24 24   BUN 16 15   CREATININE 0.7 0.7   CALCIUM 7.8* 8.1*   GLUCOSE 154* 91      ABG:  No results for input(s): PH, PO2ART, VZJ2GBZ, HCO3, BEART, O2SAT in the last 72 hours. BNP  No results found for: BNP   D-Dimer:  Lab Results   Component Value Date    DDIMER 544 (H) 12/10/2021      1. Radiology: None      Assessment/Plan     Patient Active Problem List    Diagnosis Date Noted    Acute respiratory failure with hypoxia ([facility] 12/09/2021    Pneumonia due to COVID-19 virus 12/07/2021     Overview Note:     Tested positive on 12/4/2021      Anxiety 12/28/2020     Overview Note:     Patient has anxiety. She was taking Vistaril as needed but states he still feels anxious. BuSpar 5 mg twice a day      Adjustment insomnia 09/23/2020     Overview Note:     Pt has insomnia because of anxiety and stress  Trial of trazodone 50 mg daily . Side effects explained.  Abnormal mammogram 09/24/2019     Overview Note:     9/24/19 : abnormal : repeat in 6 months.  Pt was informed by radiologist   Mammogram in 3/20 was normal.  Patient is followed by gynecologist  1/7/21 : [facility]lvarez [facility] Tachycardia 06/26/2018    Essential hypertension 06/26/2018     Overview Note:     Patient has hypertension. Pt is on diltiazem  mg bid       Adjustment disorder with anxious mood 03/23/2018     Overview Note:     Pt wants to try vistaril prn.  Thyroid ca ([address] 12/15/2015     Overview Note:     Had total thyroidectomy by Dr Merlinda Jaeger. On synthroid. Managed by Dr Jasvir Hurd.  -he increased synthroid to 125 mcg daily except  1 1/2 tablet on Sundays  Pt has f/u appointment in 11/2021      Cough variant asthma      Overview Note:     On, albuterol and singulair. Pt is on Symbicort: feels well. Acute Hypoxic resp failure  Bilateral Pneumonia sec to COVID-19  Sec. Hypothryroidism  Anxiety  Afib rate controlled       1. BIPAP  2. ICS  3. OOB  4. Decadron  5. Insulin  6. Inhalers  7. Keep sats > 92%  8. Prone ventilation as tolerated  9.  C.w present management    Electronically signed by Sweta Arteaga MD on 12/13/2021 at 1:03 PM

## 2021-12-13 NOTE — PROGRESS NOTES
Today's plan:  Echo is normal contineu care as per COVID 19 standpoint,  Start midodrine oral for hypotension and if blood pressure remains low, will recommend dopamine IV, repeat CXR, david has large b/l infiltrates and still recquies 100% oxygen      Admit Date:  12/8/2021    Subjective: on 100% NRB      Chief complaints on admission  Chief Complaint   Patient presents with    Shortness of Breath     covid +, pt sent over from infusion clinic due to oxygen levels below 95%. due to have covid infusion today. History of present Kasi Arguelles is a 45 y. o.year old who  presents with had concerns including Shortness of Breath (covid +, pt sent over from infusion clinic due to oxygen levels below 95%. due to have covid infusion today. ). Past medical history:    has a past medical history of Abnormal mammogram, Cough variant asthma, COVID-19 virus infection, Depression, Migraines, Pulmonary nodules, Thyroid ca ([address] and Thyroid nodule. Past surgical history:   has a past surgical history that includes Thyroid surgery (9-25-15). Social History:   reports that she has never smoked. She has never used smokeless tobacco. She reports current alcohol use. She reports that she does not use drugs. Family history:  family history is not on file. No Known Allergies      Objective:   BP (!) 88/55   Pulse 70   Temp 98.2 °F (36.8 °C) (Axillary)   Resp 17   Wt 162 lb (73.5 kg)   SpO2 (!) 88%   BMI 26.96 kg/m²     No intake or output data in the 24 hours ending 12/13/21 1028    TELEMETRY: Sinus     Physical Exam:  Constitutional:  Well developed, Well nourished, No acute distress, Non-toxic appearance. HENT:  Normocephalic, Atraumatic, Bilateral external ears normal, Oropharynx moist, No oral exudates, Nose normal. Neck- Normal range of motion, No tenderness, Supple, No stridor. Eyes:  PERRL, EOMI, Conjunctiva normal, No discharge.    Respiratory:  Normal breath sounds, No respiratory distress, No wheezing, No chest tenderness. ,no use of accessory muscles, diaphragm movement is normal  Cardiovascular: (PMI) apex non displaced,no lifts no thrills, no s3,no s4, Normal heart rate, Normal rhythm, No murmurs, No rubs, No gallops. Carotid arteries pulse and amplitude are normal no bruit, no abdominal bruit noted ( normal abdominal aorta ausculation), femoral arteries pulse and amplitude are normal no bruit, pedal pulses are normal  GI:  Bowel sounds normal, Soft, No tenderness, No masses, No pulsatile masses. : External genitalia appear normal, No masses or lesions. No discharge. No CVA tenderness. Musculoskeletal:  Intact distal pulses, No edema, No tenderness, No cyanosis, No clubbing. Good range of motion in all major joints. No tenderness to palpation or major deformities noted. Back- No tenderness. Integument:  Warm, Dry, No erythema, No rash. Lymphatic:  No lymphadenopathy noted. Neurologic:  Alert & oriented x 3, Normal motor function, Normal sensory function, No focal deficits noted.    Psychiatric:  Affect normal, Judgment normal, Mood normal.     Medications:    albuterol sulfate HFA  2 puff Inhalation BID    And    ipratropium  2 puff Inhalation BID    sodium chloride flush  5-40 mL IntraVENous 2 times per day    enoxaparin  30 mg SubCUTAneous BID    dexamethasone  10 mg IntraVENous Q24H    vitamin D  2,000 Units Oral Daily    budesonide-formoterol  2 puff Inhalation BID    busPIRone  5 mg Oral BID    calcium carbonate  500 mg Oral Daily    levothyroxine  125 mcg Oral Daily    montelukast  10 mg Oral Nightly      sodium chloride       benzonatate, sodium chloride flush, sodium chloride, ondansetron **OR** ondansetron, polyethylene glycol, acetaminophen **OR** acetaminophen, guaiFENesin-dextromethorphan, albuterol sulfate HFA, hydrOXYzine, traZODone    Lab Data:  CBC:   Recent Labs     12/12/21  0541   WBC 9.0   HGB 13.4   HCT 40.2   MCV 94.1        BMP:   Recent Labs 12/12/21  0541      K 5.0      CO2 24   BUN 16   CREATININE 0.7     LIVER PROFILE: No results for input(s): AST, ALT, LIPASE, BILIDIR, BILITOT, ALKPHOS in the last 72 hours. Invalid input(s): AMYLASE,  ALB  PT/INR: No results for input(s): PROTIME, INR in the last 72 hours. APTT: No results for input(s): APTT in the last 72 hours. BNP:  No results for input(s): BNP in the last 72 hours. TROPONIN: @TROPONINI:3@      Assessment:  45 y. o.year old who is admitted for          Plan:  ECHO is normal LVEF, restart IVF  covid 19' Still requires 100% NRB  Hypotension: start oral midodrine 5mg PO q8 hrs  All labs, medications and tests reviewed, continue all other medications of all above medical condition listed as is.       Eduardo Freedman MD, MD 12/13/2021 10:28 AM

## 2021-12-14 NOTE — PROGRESS NOTES
Spoke with family member over the phone. Updated on pt status and plan of care. All questions answered at this time.

## 2021-12-14 NOTE — PROGRESS NOTES
Pulmonary and Critical Care  Progress Note      VITALS:  BP (!) 81/58   Pulse 93   Temp 97.8 °F (36.6 °C) (Oral)   Resp 27   Wt 162 lb (73.5 kg)   SpO2 (!) 89%   BMI 26.96 kg/m²     Subjective:   CHIEF COMPLAINT :SOB     HPI:                The patient is sitting in the bed. She is in mild resp distress    Objective:   PHYSICAL EXAM:    LUNGS:Occasional basal crackles  Abd-soft, BS+,NT  Ext- no pedal edema  CVS-s1s2, no murmurs      DATA:    CBC:  Recent Labs     12/12/21  0541 12/13/21  1130   WBC 9.0 17.1*   RBC 4.27 4.67   HGB 13.4 14.5   HCT 40.2 43.2    244   MCV 94.1 92.5   MCH 31.4* 31.0   MCHC 33.3 33.6   RDW 12.1 12.0   SEGSPCT 81.0* 90.8*   BANDSPCT 3*  --       BMP:  Recent Labs     12/12/21  0541 12/13/21  1130    133*   K 5.0 4.0    98*   CO2 24 24   BUN 16 15   CREATININE 0.7 0.7   CALCIUM 7.8* 8.1*   GLUCOSE 154* 91      ABG:  No results for input(s): PH, PO2ART, VAH9GHB, HCO3, BEART, O2SAT in the last 72 hours. BNP  No results found for: BNP   D-Dimer:  Lab Results   Component Value Date    DDIMER 544 (H) 12/10/2021      1. Radiology: None      Assessment/Plan     Patient Active Problem List    Diagnosis Date Noted    Acute respiratory failure with hypoxia ([facility] [address] 12/09/2021    Pneumonia due to COVID-19 virus 12/07/2021     Overview Note:     Tested positive on 12/4/2021      Anxiety 12/28/2020     Overview Note:     Patient has anxiety. She was taking Vistaril as needed but states he still feels anxious. BuSpar 5 mg twice a day      Adjustment insomnia 09/23/2020     Overview Note:     Pt has insomnia because of anxiety and stress  Trial of trazodone 50 mg daily . Side effects explained.  Abnormal mammogram 09/24/2019     Overview Note:     9/24/19 : abnormal : repeat in 6 months.  Pt was informed by radiologist   Mammogram in 3/20 was normal.  Patient is followed by gynecologist  1/7/21 : Michelle Parmar normal at [address] Tachycardia 06/26/2018    Essential hypertension 06/26/2018     Overview Note:     Patient has hypertension. Pt is on diltiazem  mg bid       Adjustment disorder with anxious mood 03/23/2018     Overview Note:     Pt wants to try vistaril prn.  Thyroid ca ([address] 12/15/2015     Overview Note:     Had total thyroidectomy by Dr Seth Meraz. On synthroid. Managed by Dr Mat Cole.  -he increased synthroid to 125 mcg daily except  1 1/2 tablet on Sundays  Pt has f/u appointment in 11/2021      Cough variant asthma      Overview Note:     On, albuterol and singulair. Pt is on Symbicort: feels well. Acute Hypoxic resp failure  Bilateral Pneumonia sec to COVID-19  Sec. Hypothryroidism  Anxiety  Afib rate controlled       1. Decadron  2. Insulin  3. Inhalers  4. ICS  5. OOB  6. Keep sats > 92%  7. Prone ventilation  8. BIPAP  9. C/w present management  10.  CXR in am    Electronically signed by Harshil Quispe MD on 12/14/2021 at 11:04 AM

## 2021-12-14 NOTE — PROGRESS NOTES
Infectious Disease Progress Note  2021   Patient Name: Ramiro Mcnamara : 1970       Reason for visit: F/u COVID-19 pneumonia, acute respiratory failure? History:? Interval history noted  Denies n/v/d/f or untoward effects of antimicrobials  Physical Exam:  Vital Signs: /63   Pulse 71   Temp 98.2 °F (36.8 °C) (Oral)   Resp 15   Wt 162 lb (73.5 kg)   SpO2 93%   BMI 26.96 kg/m²     Gen: alert and oriented X3, NRBM  Skin: no stigmata of endocarditis  Wounds: C/D/I  HEMT: AT/NC Oropharynx pink, moist, and without lesions or exudates; dentition in good state of repair  Eyes: PERRLA, EOMI, conjunctiva pink, sclera anicteric. Neck: Supple. Trachea midline. No LAD. Chest:  Reduced air entry bilaterally  Heart: RRR  Abd: soft, non-distended, no tenderness, no hepatomegaly. Normoactive bowel sounds. Ext: no clubbing, cyanosis, or edema  Catheter Site: without erythema or tenderness  LDA: CVC:  Neuro: Mental status intact. CN 2-12 intact and no focal sensory or motor deficits     Radiologic / Imaging / TESTING  No results found.      Labs:    Recent Results (from the past 24 hour(s))   CBC auto differential    Collection Time: 21 11:30 AM   Result Value Ref Range    WBC 17.1 (H) 4.0 - 10.5 K/CU MM    RBC 4.67 4.2 - 5.4 M/CU MM    Hemoglobin 14.5 12.5 - 16.0 GM/DL    Hematocrit 43.2 37 - 47 %    MCV 92.5 78 - 100 FL    MCH 31.0 27 - 31 PG    MCHC 33.6 32.0 - 36.0 %    RDW 12.0 11.7 - 14.9 %    Platelets 490 071 - 972 K/CU MM    MPV 9.6 7.5 - 11.1 FL    Differential Type AUTOMATED DIFFERENTIAL     Segs Relative 90.8 (H) 36 - 66 %    Lymphocytes % 4.4 (L) 24 - 44 %    Monocytes % 2.4 0 - 4 %    Eosinophils % 0.2 0 - 3 %    Basophils % 0.3 0 - 1 %    Segs Absolute 15.6 K/CU MM    Lymphocytes Absolute 0.8 K/CU MM    Monocytes Absolute 0.4 K/CU MM    Eosinophils Absolute 0.0 K/CU MM    Basophils Absolute 0.1 K/CU MM    Nucleated RBC % 0.0 %    Total Nucleated RBC 0.0 K/CU MM    Total Immature Neutrophil 0.33 K/CU MM    Immature Neutrophil % 1.9 (H) 0 - 0.43 %   Basic metabolic panel    Collection Time: 12/13/21 11:30 AM   Result Value Ref Range    Sodium 133 (L) 135 - 145 MMOL/L    Potassium 4.0 3.5 - 5.1 MMOL/L    Chloride 98 (L) 99 - 110 mMol/L    CO2 24 21 - 32 MMOL/L    Anion Gap 11 4 - 16    BUN 15 6 - 23 MG/DL    CREATININE 0.7 0.6 - 1.1 MG/DL    Glucose 91 70 - 99 MG/DL    Calcium 8.1 (L) 8.3 - 10.6 MG/DL    GFR Non-African American >60 >60 mL/min/1.73m2    GFR African American >60 >60 mL/min/1.73m2     CULTURE results: Invalid input(s): BLOOD CULTURE,  URINE CULTURE, SURGICAL CULTURE    Diagnosis:  Patient Active Problem List   Diagnosis    Cough variant asthma    Thyroid ca ([facility] [address]    Adjustment disorder with anxious mood    Tachycardia    Essential hypertension    Abnormal mammogram    Adjustment insomnia    Anxiety    Pneumonia due to COVID-19 virus    Acute respiratory failure with hypoxia (HCC)       Active Problems  Active Problems:    Pneumonia due to COVID-19 virus    Acute respiratory failure with hypoxia (HCC)  Resolved Problems:    * No resolved hospital problems. *      Impression and plan   Summary and rationale: Patient is a 45 y.o.  female medical history of anxiety/depression, hypertension, mild intermittent asthma thyroid cancer status post total thyroidectomy, received 1 dose of the [employer] Covid vaccine on 10/29/2021 who was admitted 12/8/2021 for further evaluation and management of shortness of breath.     COVID-19 symptom onset:11/30/2021   COVID-19 test date: 12/4/2021   Expected discontinuation of isolation precautions: 20 days, end-date: 12/20/2021   Clinical status:day 14 of disease, clinically same- severe disease   Therapeutic:  o Ongoing antibiotics:  o Antiviral agent: remdesivir  o Immunomodulators:  - Dexamethasone: 12/8-  - Solu-Medrol: n/a  - Tocilizumab:received on 12/9/2021  - Baracitinib:  - IVIG:  - Enoxaparin 30 mg sc bid  o Completed antibiotics: n/a  o Other agents:    Diagnostic:   F/u:   Other:      Electronically signed by: Electronically signed by Royce Lopez MD on 12/14/2021 at 7:57 AM

## 2021-12-14 NOTE — ADT AUTH CERT
Pneumonia - Care Day 4 (12/12/2021) by Pelon Monteiro RN       Review Status Review Entered   Completed 12/14/2021 12:33      Criteria Review      Care Day: 4 Care Date: 12/12/2021 Level of Care: Intermediate Care    Guideline Day 2    Clinical Status    (X) * No CO2 retention or acidosis    (X) * No requirement for mechanical ventilation    ( ) * Hypotension absent    12/14/2021 12:33 PM EST by Kiki Distad      97.8 (36.6) 27 65 108/66    (X) * Afebrile or fever improved    ( ) * No hypoxia on room air or oxygenation improved    12/14/2021 12:33 PM EST by Kiki Distad      99% on 14L high flow o2    (X) * Mental status improved or at baseline    Activity    (X) * Increased activity    Routes    ( ) Oral hydration    12/14/2021 12:33 PM EST by Kiki Distad      IVF 75hr    (X) Oral medications    12/14/2021 12:33 PM EST by Kiki Distad      albuterol x2, atrovent x2, symbicort x2, buspar po x2, tums, decadron iv x1, lovenox sc x2, synthroid po x1, singulair po x1, vit d po x1, tylenol po x1, tussin po x3, vistaril po x2    (X) Usual diet    Interventions    (X) Incentive spirometry    (X) Pulse oximetry    (X) Possible oxygen    * Milestone   Additional Notes   DATE: 12/12 day 4       Pertinent Updates:   Complains of nil, but still requiring high flow oxygen      Vitals:    /66   Pulse 65   Temp 97.8 °F (36.6 °C) (Axillary)   Resp 22   Wt 162 lb (73.5 kg)   SpO2 99%      Physical Exam:   Heart: Distant heart sounds   Lungs: Distant lung sounds due to poor quality disposable stethoscope and wearing a PAPR   Abdomen: Soft, non distended. Bowel sounds not appreciated. No obvious liver or spleen enlargement. Non tender, no rebound noted. Extremities: Non tender, no swelling noted, strength 5/5 both legs. CNS: Grossly intact.       Abnl/Pertinent Labs/Radiology/Diagnostic Studies:   Gluc 154, ca 7.8, wbc 9.0        CXR: Subtle bilateral pulmonary infiltrates, right greater than left, without acute interval change      MD Consults/Assessments & Plans:   Assessment:           Patient Active Problem List     Diagnosis Date Noted   · Acute respiratory failure with hypoxia ([facility] [address] 12/09/2021   · Pneumonia due to COVID-19 virus 12/07/2021   · Anxiety 12/28/2020   · Adjustment insomnia 09/23/2020   · Abnormal mammogram 09/24/2019   · Tachycardia 06/26/2018   · Essential hypertension 06/26/2018   · Adjustment disorder with anxious mood 03/23/2018   · Thyroid ca ([facility] [address] 12/15/2015   · Cough variant asthma             Plan:       Problems being addressed this admission:    Severe sepsis secondary to COVID-19 pneumonia / acute hypoxic respiratory failure / Acute on chronic COPD   12/10/2021 On admission patient was on high flow oxygen, placed on BiPAP, CT chest did not show any PE, patchy opacities bilaterally, procal low, CRP elevated, Pulmonology consult S/p toci, monoclonal antibodies Continue dexamethasone and PPI  barcitinib per pulmonology    12/11/2021 patient's oxygenation still borderline on BiPAP, continues to be followed by pulmonary above treatment plan continues.  Continue to monitor.  Her labs are not back from today further recommendations once they are back.    12/12/2021 patient is off the BiPAP and also on high flow oxygen over 15 L/min overall looking better from yesterday continue with the present management plan.       Mood disorder:    12/10/2021 buspirone       Hypothyroidism:    12/10/2021 levothyroxine       PAF ? / Hypotension   12/10/2021 continue diltiazem   12/11/2021 patient is on Cardizem 120 mg twice a day seems like an odd dose will consult cardiology for her may need to hold it for now as her blood pressure is marginal as per last EKG not in atrial fib has some sinus tach.  She probably has paroxysmal atrial fib.    12/12/2021 patient was seen by cardiology who does not think she has atrial fibrillation Cardizem was held then started on IV fluids and echo ordered.  Getting her down her IV fluids to 75 cc an hour is afraid she might going to failure.       Consultants:   Pulmonary   Cardiology      PULM:    She is in mild resp distress. She is still on high fio2   Acute Hypoxic resp failure   Bilateral Pneumonia sec to COVID-19   Sec. Hypothryroidism   Anxiety   Afib rate controlled           1. Decadron   2. Insulin   3. Inhalers   4. Keep sats > 92%   5. BIPAP   6. ICS   7. OOB   8. Prone ventilation as tolerated   9. C/w present management         CARDIO:        Assessment:  48 y. o.year old who is admitted for                   Plan:   1. ECHO PENDING   2. covid 19' Still requires 100% NRB   3. Hypotension: stable, dc IVF   4.  All labs, medications and tests reviewed, continue all other medications of all above medical condition listed as is           Pneumonia - Care Day 3 (12/11/2021) by Pelon Monteiro RN       Review Status Review Entered   Completed 12/14/2021 12:19      Criteria Review      Care Day: 3 Care Date: 12/11/2021 Level of Care: Intermediate Care    Guideline Day 2    Clinical Status    (X) * No CO2 retention or acidosis    (X) * No requirement for mechanical ventilation    (X) * Hypotension absent    12/14/2021 12:19 PM EST by Kiki Edxactganesh      98.2 (36.8) 22 67 99/72    (X) * Afebrile or fever improved    ( ) * No hypoxia on room air or oxygenation improved    12/14/2021 12:19 PM EST by Juan Mae 87% on 15L NRBM    (X) * Mental status improved or at baseline    Activity    (X) * Increased activity    Routes    ( ) Oral hydration    12/14/2021 12:19 PM EST by Kiki Edxactad      IVF 75hr    ( ) Oral medications    12/14/2021 12:19 PM EST by Kiki Distad      albuterol x2, atrovent x2, symbicort x2, buspar po x2, tums po x1, decadron iv x1, lovenox scx2, synthroid po x1, singulair po x1, vit d po x1, tussin po x1, vistaril po x1,    (X) Usual diet    Interventions    (X) Incentive spirometry    (X) Pulse oximetry    (X) Possible oxygen    * Milestone   Additional Notes   DATE: 12/11 day 3      Pertinent Updates:   is on the Covid floor on BiPAP      Vitals: /69   Pulse 73   Temp 98.9 °F (37.2 °C) (Axillary)   Resp (!) 31   Wt 162 lb (73.5 kg)   SpO2 (!) 87%      Physical Exam:   Physical Exam:   Heart: Distant heart sounds   Lungs: Distant lung sounds difficult to evaluate due to poor quality stethoscope and wearing a PAPR. Abdomen: Soft, non distended. Bowel sounds not appreciated. No obvious liver or spleen enlargement. Non tender, no rebound noted. Extremities: Non tender, no swelling noted, strength 5/5 both legs. CNS: Grossly intact.       MD Consults/Assessments & Plans:      Assessment:           Patient Active Problem List     Diagnosis Date Noted   · Acute respiratory failure with hypoxia ([facility] [address] 12/09/2021   · Pneumonia due to COVID-19 virus 12/07/2021   · Anxiety 12/28/2020   · Adjustment insomnia 09/23/2020   · Abnormal mammogram 09/24/2019   · Tachycardia 06/26/2018   · Essential hypertension 06/26/2018   · Adjustment disorder with anxious mood 03/23/2018   · Thyroid ca ([facility] [address] 12/15/2015   · Cough variant asthma             Plan:       Problems being addressed this admission:    Severe sepsis secondary to COVID-19 pneumonia / acute hypoxic respiratory failure / Acute on chronic COPD   12/10/2021 On admission patient was on high flow oxygen,    placed on BiPAP, CT chest did not show any PE, patchy opacities bilaterally,    procal low, CRP elevated, Pulmonology consult S/p toci, monoclonal antibodies    Continue dexamethasone and PPI  barcitinib per pulmonology    12/11/2021 patient's oxygenation still borderline on BiPAP, continues to be followed by pulmonary above treatment plan continues.  Continue to monitor. Radha Fields labs are not back from today further recommendations once they are back.       Mood disorder:    12/10/2021 buspirone       Hypothyroidism:    12/10/2021 levothyroxine       PAF    12/10/2021 continue diltiazem   12/11/2021 patient is on Cardizem 120 mg twice a day seems like an odd dose will consult cardiology for her may need to hold it for now as her blood pressure is marginal as per last EKG not in atrial fib has some sinus tach.  She probably has paroxysmal atrial fib.        Consultants:   Pulmonary   Cardiology      PULM:    She is in mild resp distress. She has afib. She is on 80% fio2   Acute Hypoxic resp failure   Bilateral Pneumonia sec to COVID-19   Sec. Hypothryroidism   Anxiety   Afib           1. Afib rate control   2. Topcilizumab   3. Decadron   4. Insulin   5. Inhalers   6. BIPAP   7. Keep sats >88%   8. ICS   9. OOB   10. Prone ventilation as tolerated   11. CXR in am   15.  C/w present management

## 2021-12-15 NOTE — PROGRESS NOTES
Today's plan:  Echo is normal repeat CXR is getting worse patishaneka has large b/l infiltrates and still recquies 100% oxygen, will start levophed if increasing midodrine to 10mg q8hrs does not work, and case discussed with Pulmonary, it looks ARDS, will d.c IVF, and once blood pressure gets better, will need IV LASIX  She will also need empericall antibiotics    Admit Date:  12/8/2021    Subjective: on 100% NRB      Chief complaints on admission  Chief Complaint   Patient presents with    Shortness of Breath     covid +, pt sent over from infusion clinic due to oxygen levels below 95%. due to have covid infusion today. History of present Rama Santiago is a 45 y. o.year old who  presents with had concerns including Shortness of Breath (covid +, pt sent over from infusion clinic due to oxygen levels below 95%. due to have covid infusion today. ). Past medical history:    has a past medical history of Abnormal mammogram, Cough variant asthma, COVID-19 virus infection, Depression, Migraines, Pulmonary nodules, Thyroid ca ([address] and Thyroid nodule. Past surgical history:   has a past surgical history that includes Thyroid surgery (9-25-15). Social History:   reports that she has never smoked. She has never used smokeless tobacco. She reports current alcohol use. She reports that she does not use drugs. Family history:  family history is not on file. No Known Allergies      Objective:   BP (!) 79/53   Pulse 87   Temp 98 °F (36.7 °C) (Axillary)   Resp (!) 33   Ht 5' 5\" (1.651 m)   Wt 162 lb (73.5 kg)   SpO2 (!) 87%   BMI 26.96 kg/m²     No intake or output data in the 24 hours ending 12/15/21 1217    TELEMETRY: Sinus     Physical Exam:  Constitutional:  Well developed, Well nourished, No acute distress, Non-toxic appearance.    HENT:  Normocephalic, Atraumatic, Bilateral external ears normal, Oropharynx moist, No oral exudates, Nose normal. Neck- Normal range of motion, No tenderness, Supple, No stridor. Eyes:  PERRL, EOMI, Conjunctiva normal, No discharge. Respiratory:  Normal breath sounds, No respiratory distress, No wheezing, No chest tenderness. ,no use of accessory muscles, diaphragm movement is normal  Cardiovascular: (PMI) apex non displaced,no lifts no thrills, no s3,no s4, Normal heart rate, Normal rhythm, No murmurs, No rubs, No gallops. Carotid arteries pulse and amplitude are normal no bruit, no abdominal bruit noted ( normal abdominal aorta ausculation), femoral arteries pulse and amplitude are normal no bruit, pedal pulses are normal  GI:  Bowel sounds normal, Soft, No tenderness, No masses, No pulsatile masses. : External genitalia appear normal, No masses or lesions. No discharge. No CVA tenderness. Musculoskeletal:  Intact distal pulses, No edema, No tenderness, No cyanosis, No clubbing. Good range of motion in all major joints. No tenderness to palpation or major deformities noted. Back- No tenderness. Integument:  Warm, Dry, No erythema, No rash. Lymphatic:  No lymphadenopathy noted. Neurologic:  Alert & oriented x 3, Normal motor function, Normal sensory function, No focal deficits noted.    Psychiatric:  Affect normal, Judgment normal, Mood normal.     Medications:    midodrine  5 mg Oral TID WC    albuterol sulfate HFA  2 puff Inhalation BID    And    ipratropium  2 puff Inhalation BID    sodium chloride flush  5-40 mL IntraVENous 2 times per day    enoxaparin  30 mg SubCUTAneous BID    dexamethasone  10 mg IntraVENous Q24H    vitamin D  2,000 Units Oral Daily    budesonide-formoterol  2 puff Inhalation BID    busPIRone  5 mg Oral BID    calcium carbonate  500 mg Oral Daily    levothyroxine  125 mcg Oral Daily    montelukast  10 mg Oral Nightly      sodium chloride 75 mL/hr at 12/15/21 1045    sodium chloride       sodium chloride, benzonatate, sodium chloride flush, sodium chloride, ondansetron **OR** ondansetron, polyethylene glycol, acetaminophen **OR** acetaminophen, guaiFENesin-dextromethorphan, albuterol sulfate HFA, hydrOXYzine, traZODone    Lab Data:  CBC:   Recent Labs     12/13/21  1130 12/14/21  1356 12/15/21  1118   WBC 17.1* 13.4* 14.3*   HGB 14.5 14.5 14.5   HCT 43.2 42.2 41.9   MCV 92.5 90.2 91.5    204 192     BMP:   Recent Labs     12/13/21  1130 12/14/21  1356   * 135   K 4.0 3.5   CL 98* 99   CO2 24 23   BUN 15 15   CREATININE 0.7 0.7     LIVER PROFILE: No results for input(s): AST, ALT, LIPASE, BILIDIR, BILITOT, ALKPHOS in the last 72 hours. Invalid input(s): AMYLASE,  ALB  PT/INR: No results for input(s): PROTIME, INR in the last 72 hours. APTT: No results for input(s): APTT in the last 72 hours. BNP:  No results for input(s): BNP in the last 72 hours. TROPONIN: @TROPONINI:3@      Assessment:  45 y. o.year old who is admitted for          Plan:  ECHO is normal LVEF,   covid 23' Still requires 100% NRB  Hypotension: start oral midodrine 10 mg PO q8 hrs  Critical care 35 mins  All labs, medications and tests reviewed, continue all other medications of all above medical condition listed as is.       Gigi Holden MD, MD 12/15/2021 12:17 PM

## 2021-12-15 NOTE — PROGRESS NOTES
Pulmonary and Critical Care  Progress Note      VITALS:  BP (!) 79/53   Pulse 87   Temp 98 °F (36.7 °C) (Axillary)   Resp (!) 33   Ht 5' 5\" (1.651 m)   Wt 162 lb (73.5 kg)   SpO2 (!) 87%   BMI 26.96 kg/m²     Subjective:   CHIEF COMPLAINT :SOB     HPI:                The patient is lying in the bed. She is in mild resp distress. She is still on high fio2    Objective:   PHYSICAL EXAM:    LUNGS:Occasional basal crackles  Abd-soft, BS+,NT  Ext- no pedal edema  CVS-s1s2, no murmurs      DATA:    CBC:  Recent Labs     12/13/21  1130 12/14/21  1356 12/15/21  1118   WBC 17.1* 13.4* 14.3*   RBC 4.67 4.68 4.58   HGB 14.5 14.5 14.5   HCT 43.2 42.2 41.9    204 192   MCV 92.5 90.2 91.5   MCH 31.0 31.0 31.7*   MCHC 33.6 34.4 34.6   RDW 12.0 12.0 12.2   SEGSPCT 90.8* 92.9*  --       BMP:  Recent Labs     12/13/21  1130 12/14/21  1356   * 135   K 4.0 3.5   CL 98* 99   CO2 24 23   BUN 15 15   CREATININE 0.7 0.7   CALCIUM 8.1* 8.3   GLUCOSE 91 152*      ABG:  Recent Labs     12/15/21  1000   PH 7.45   PO2ART 85   XER5OKE 36.0   O2SAT 94.9*     BNP  No results found for: BNP   D-Dimer:  Lab Results   Component Value Date    DDIMER 544 (H) 12/10/2021      Radiology:   Findings consistent with progressive/radiographically worse diffuse   pneumonitis/pneumonia compared to 12/12/2021.  Pulmonary edema of cardiogenic   or noncardiogenic etiology may contribute to this appearance.  No large areas   of pulmonary consolidation or detectable pleural effusions on the current   study. 1.       Assessment/Plan     Patient Active Problem List    Diagnosis Date Noted    Acute respiratory failure with hypoxia ([facility] [address] 12/09/2021    Pneumonia due to COVID-19 virus 12/07/2021     Overview Note:     Tested positive on 12/4/2021      Anxiety 12/28/2020     Overview Note:     Patient has anxiety. She was taking Vistaril as needed but states he still feels anxious.   BuSpar 5 mg twice a day      Adjustment insomnia 09/23/2020 Overview Note:     Pt has insomnia because of anxiety and stress  Trial of trazodone 50 mg daily . Side effects explained.  Abnormal mammogram 09/24/2019     Overview Note:     9/24/19 : abnormal : repeat in 6 months. Pt was informed by radiologist   Mammogram in 3/20 was normal.  Patient is followed by gynecologist  1/7/21 : Fox Ramirez normal at [address] Tachycardia 06/26/2018    Essential hypertension 06/26/2018     Overview Note:     Patient has hypertension. Pt is on diltiazem  mg bid       Adjustment disorder with anxious mood 03/23/2018     Overview Note:     Pt wants to try vistaril prn.  Thyroid ca ([address] 12/15/2015     Overview Note:     Had total thyroidectomy by Dr Yancy Quigley. On synthroid. Managed by Dr Tisha Jimenez.  -he increased synthroid to 125 mcg daily except  1 1/2 tablet on Sundays  Pt has f/u appointment in 11/2021      Cough variant asthma      Overview Note:     On, albuterol and singulair. Pt is on Symbicort: feels well. Acute Hypoxic resp failure sec to Cardiogenic and non cardiogenic Pulmonary edema  Bilateral Pneumonia sec to COVID-19  Sec. Hypothryroidism  Anxiety  Afib rate controlled       1. Decadron  2. Insulin  3. Inhalers  4. ICS  5. OOB  6. BIPAP  7. Prone ventilation as tolerated  8. Keep sats > 88%  9. C.w present management  10. Need empiric Abx  11. Pan cx  12. Pressors for MAP > 65 mmhg  13. IV fluids  cc/ hr x 24 hrs  14.  Will intubate if she gets worse    Electronically signed by Héctor Bedoya MD on 12/15/2021 at 12:13 PM

## 2021-12-15 NOTE — PROGRESS NOTES
Hospitalist Progress Note      PCP: Marco A Hopkins MD    Date of Admission: 2021    Chief Complaint on Admission: SOB    Pt Seen/Examined and Chart Reviewed. Admitting dx covid pneumonia    SUBJECTIVE:   Seen at bedside. Complains of tiredness. Not in distress. OBJECTIVE:   Vitals    TEMPERATURE:  Current - Temp: 97.4 °F (36.3 °C); Max - Temp  Av.9 °F (36.6 °C)  Min: 97.4 °F (36.3 °C)  Max: 98.2 °F (36.8 °C)  RESPIRATIONS RANGE: Resp  Av.7  Min: 15  Max: 27  PULSE RANGE: Pulse  Av.4  Min: 64  Max: 93  BLOOD PRESSURE RANGE:  Systolic (11XRG), OBZ:48 , Min:81 , FLIP:542   ; Diastolic (47KES), SEY:87, Min:48, Max:72    PULSE OXIMETRY RANGE: SpO2  Av.4 %  Min: 87 %  Max: 98 %  24HR INTAKE/OUTPUT:      Intake/Output Summary (Last 24 hours) at 2021  Last data filed at 2021 1200  Gross per 24 hour   Intake 480 ml   Output --   Net 480 ml       Exam:  General appearance: Well, no apparent distress, appears stated age and cooperative. HEENT Normal cephalic, atraumatic without obvious deformity. Pupils equal, round, and reactive to light. Extra ocular muscles intact. Conjunctivae/corneas clear. Neck: Supple,Trachea midline without thyromegaly or adenopathy with full range of motion. Lungs: bilateral rales, no distress, no wheezing  Heart: Regular rate and rhythm with Normal S1/S2 without  murmurs, rubs or gallops, point of maximum impulse non-displaced  Abdomen: Soft, non-tender or non-distended without rigidity or guarding and positive bowel sounds all four quadrants. Extremities: No clubbing, cyanosis, or edema bilaterally. Full range of motion without deformity and normal gait intact. Skin: Skin color, texture, turgor normal. No rashes or lesions. Neurologic: Alert and oriented X 3,  neurovascularly intact with sensory/motor intact upper extremities/lower extremities, bilaterally.  Cranial nerves:II-XII intact, grossly non-focal.  Mental status: Alert, oriented, thought content appropriate. Data    Recent Labs     12/12/21  0541 12/13/21  1130 12/14/21  1356   WBC 9.0 17.1* 13.4*   HGB 13.4 14.5 14.5   HCT 40.2 43.2 42.2    244 204      Recent Labs     12/12/21  0541 12/13/21  1130 12/14/21  1356    133* 135   K 5.0 4.0 3.5    98* 99   CO2 24 24 23   BUN 16 15 15   CREATININE 0.7 0.7 0.7     No results for input(s): AST, ALT, ALB, BILIDIR, BILITOT, ALKPHOS in the last 72 hours. No results for input(s): INR in the last 72 hours. No results for input(s): CKTOTAL, CKMB, CKMBINDEX, TROPONINI in the last 72 hours. Imaging/Test Results    Consults:     IP CONSULT TO HOSPITALIST  IP CONSULT TO PULMONOLOGY  IP CONSULT TO CARDIOLOGY  IP CONSULT TO INFECTIOUS DISEASES    Allergies  Patient has no known allergies.     Medications      Scheduled Meds:   midodrine  5 mg Oral TID WC    albuterol sulfate HFA  2 puff Inhalation BID    And    ipratropium  2 puff Inhalation BID    sodium chloride flush  5-40 mL IntraVENous 2 times per day    enoxaparin  30 mg SubCUTAneous BID    dexamethasone  10 mg IntraVENous Q24H    vitamin D  2,000 Units Oral Daily    budesonide-formoterol  2 puff Inhalation BID    busPIRone  5 mg Oral BID    calcium carbonate  500 mg Oral Daily    levothyroxine  125 mcg Oral Daily    montelukast  10 mg Oral Nightly       Infusions:   sodium chloride         PRN Meds:  sodium chloride, benzonatate, sodium chloride flush, sodium chloride, ondansetron **OR** ondansetron, polyethylene glycol, acetaminophen **OR** acetaminophen, guaiFENesin-dextromethorphan, albuterol sulfate HFA, hydrOXYzine, traZODone    ASSESSMENT AND PLAN      Active Hospital Problems    Diagnosis Date Noted    Acute respiratory failure with hypoxia ([facility] [address] [J96.01] 12/09/2021    Pneumonia due to COVID-19 virus [U07.1, J12.82] 12/07/2021       Problems being addressed this admission:   Severe sepsis secondary to COVID-19 pneumonia / acute hypoxic respiratory failure / Acute on chronic COPD  12/10/2021 On admission patient was on high flow oxygen, placed on BiPAP, CT chest did not show any PE, patchy opacities bilaterally, procal low, CRP elevated, Pulmonology consult S/p toci, monoclonal antibodies Continue dexamethasone and PPI   12/11/2021 patient's oxygenation still borderline on BiPAP, continues to be followed by pulmonary above treatment plan continues.    12/12/2021 patient is off the BiPAP and also on high flow oxygen over 15 L/min overall looking better from yesterday continue with the present management plan. Pt on 15L NC. Echo good. Wean oxygen as tolerated, quantiferon indeterminate, ID following. Recommends to trend CRP and procalcitonin     Mood disorder:   On buspirone     Hypothyroidism:   On levothyroxine     PAF ? / Hypotension  Pt was patient is on Cardizem 120 mg twice a day seems like an odd dose will consult cardiology for her may need to hold it for now as her blood pressure is marginal as per last EKG not in atrial fib has some sinus tach.  She probably has paroxysmal atrial fib.   12/12/2021 patient was seen by cardiology who does not think she has atrial fibrillation Cardizem was held then started on IV fluids and echo done. Off iv fluids. H/o HTN--was on dilt, stopped d/t hypotension, started on midodrine by cardio, monitor BP, encouraged PO fluids       PT/OT Eval Status: deferred    DVT Prophylaxis: SQ lovenox  Diet: ADULT DIET;  Regular  Code Status: Full Code      Dispo - echo with EF 55%, on 15L NC, f/u ID consult recs, wean oxygen as tolerated    Beba Og MD

## 2021-12-15 NOTE — ADT AUTH CERT
bilaterally.  Full range of motion without deformity and normal gait intact. Skin: Skin color, texture, turgor normal. No rashes or lesions. Neurologic: Alert and oriented X 3,  neurovascularly intact with sensory/motor intact upper extremities/lower extremities, bilaterally. Cranial nerves:II-XII intact, grossly non-focal.   Mental status: Alert, oriented, thought content appropriate.       Abnl/Pertinent Labs/Radiology/Diagnostic Studies:   Wbc 13.4, na 135, k3.5       Medications:   Scheduled Meds:midodrine, 10 mg, Oral, TID WC   albuterol sulfate HFA, 2 puff, Inhalation, BID    And   ipratropium, 2 puff, Inhalation, BID   sodium chloride flush, 5-40 mL, IntraVENous, 2 times per day   enoxaparin, 30 mg, SubCUTAneous, BID   dexamethasone, 10 mg, IntraVENous, Q24H   vitamin D, 2,000 Units, Oral, Daily   budesonide-formoterol, 2 puff, Inhalation, BID   busPIRone, 5 mg, Oral, BID   calcium carbonate, 500 mg, Oral, Daily   levothyroxine, 125 mcg, Oral, Daily   montelukast, 10 mg, Oral, Nightly      MD Consults/Assessments & Plans:   ASSESSMENT AND PLAN         Active Hospital Problems     Diagnosis Date Noted   · Acute respiratory failure with hypoxia ([facility] [J96.01] 12/09/2021   · Pneumonia due to COVID-19 virus [U07.1, J12.82] 12/07/2021           Problems being addressed this admission:    Severe sepsis secondary to COVID-19 pneumonia / acute hypoxic respiratory failure / Acute on chronic COPD   12/10/2021 On admission patient was on high flow oxygen, placed on BiPAP, CT chest did not show any PE, patchy opacities bilaterally, procal low, CRP elevated, Pulmonology consult S/p toci, monoclonal antibodies Continue dexamethasone and PPI    12/11/2021 patient's oxygenation still borderline on BiPAP, continues to be followed by pulmonary above treatment plan continues.     12/12/2021 patient is off the BiPAP and also on high flow oxygen over 15 L/min overall looking better from yesterday continue with the present management plan. Pt on 15L NC. Echo good. Wean oxygen as tolerated, quantiferon indeterminate, ID following.  Recommends to trend CRP and procalcitonin       Mood disorder:    On buspirone       Hypothyroidism:    On levothyroxine       PAF ? / Hypotension   Pt was patient is on Cardizem 120 mg twice a day seems like an odd dose will consult cardiology for her may need to hold it for now as her blood pressure is marginal as per last EKG not in atrial fib has some sinus tach.  She probably has paroxysmal atrial fib.    12/12/2021 patient was seen by cardiology who does not think she has atrial fibrillation Cardizem was held then started on IV fluids and echo done. Off iv fluids. H/o HTN--was on dilt, stopped d/t hypotension, started on midodrine by cardio, monitor BP, encouraged PO fluids           PT/OT Eval Status: deferred       DVT Prophylaxis: SQ lovenox   Diet: ADULT DIET; Regular   Code Status: Full Code       Dispo - echo with EF 55%, on 15L NC, f/u ID consult recs, wean oxygen as tolerated      PULM:   Acute Hypoxic resp failure   Bilateral Pneumonia sec to COVID-19   Sec. Hypothryroidism   Anxiety   Afib rate controlled           1. Decadron   2. Insulin   3. Inhalers   4. ICS   5. OOB   6. Keep sats > 92%   7. Prone ventilation   8. BIPAP   9. C/w present management   10. CXR in am      ID:    Impression and plan   · Summary and rationale: Patient is a 45 y.o.  female medical history of anxiety/depression, hypertension, mild intermittent asthma thyroid cancer status post total thyroidectomy, received 1 dose of the Wolf Peter Covid vaccine on 10/29/2021 who was admitted 12/8/2021 for further evaluation and management of shortness of breath.     · COVID-19 symptom onset:11/30/2021   · COVID-19 test date: 12/4/2021   · Expected discontinuation of isolation precautions: 20 days, end-date: 12/20/2021   · Clinical status:day 14 of disease, clinically same- severe disease   · Therapeutic:   Ongoing antibiotics:   Antiviral agent: remdesivir   Immunomodulators:   Dexamethasone: 12/8-   Solu-Medrol: n/a   Tocilizumab:received on 12/9/2021   Baracitinib:   IVIG:   Enoxaparin 30 mg sc bid   Completed antibiotics: n/a   Other agents:          PT/OT/SLP/CM Assessments or Notes:                    Pneumonia - Care Day 5 (12/13/2021) by Pelon Monteiro RN       Review Status Review Entered   Completed 12/15/2021 12:46      Criteria Review      Care Day: 5 Care Date: 12/13/2021 Level of Care: Intermediate Care    Guideline Day 2    Clinical Status    (X) * No CO2 retention or acidosis    (X) * No requirement for mechanical ventilation    (X) * Hypotension absent    12/15/2021 12:46 PM EST by Kiki Dtimeganesh      98.1 (36.7) 25 64 108/70 88% on 15L high flow    (X) * Afebrile or fever improved    ( ) * No hypoxia on room air or oxygenation improved    12/15/2021 12:46 PM EST by Kikiingris Florez      88% on 15L high flow    (X) * Mental status improved or at baseline    Activity    (X) * Increased activity    Routes    (X) Oral medications    12/15/2021 12:46 PM EST by Kiki Gautam      albuterol x2, atrovent x2, symbicort x2, buspar po x2, tums, decadron ivx1, lovenox sc x2, synthroid po x1, proamatine po x2, singulair po x1, vit d po x1, tylenol po x1, vistaril po x2    (X) Usual diet    Interventions    (X) Incentive spirometry    (X) Pulse oximetry    (X) Possible oxygen    * Milestone   Additional Notes   DATE: 12/13 day 5       Pertinent Updates:   Very SOB, on 15L NC, SBP 80's, pt dizzy when ambulated to bathroom      Vitals: 98.2 (36.8) 18 70 88/55 86% on 15LNC high flow       Physical Exam:   General appearance: Well, no apparent distress, appears stated age and cooperative. HEENT Normal cephalic, atraumatic without obvious deformity. Pupils equal, round, and reactive to light.  Extra ocular muscles intact. Conjunctivae/corneas clear. Neck: Supple, No jugular venous distention/bruits.  Trachea midline without thyromegaly or adenopathy with full range of motion. Lungs: bilateral rales, moderate resp distress, no wheezing   Heart: Regular rate and rhythm with Normal S1/S2 without  murmurs, rubs or gallops, point of maximum impulse non-displaced   Abdomen: Soft, non-tender or non-distended without rigidity or guarding and positive bowel sounds all four quadrants. Extremities: No clubbing, cyanosis, or edema bilaterally.  Full range of motion without deformity and normal gait intact. Skin: Skin color, texture, turgor normal. No rashes or lesions. Neurologic: Alert and oriented X 3,  neurovascularly intact with sensory/motor intact upper extremities/lower extremities, bilaterally.  Cranial nerves:II-XII intact, grossly non-focal.   Mental status: Alert, oriented, thought content appropriate.       Abnl/Pertinent Labs/Radiology/Diagnostic Studies:   Wbc 17.1, na 133, cl 98, ca 8.1,       Medications:   Scheduled Meds:midodrine, 10 mg, Oral, TID WC   albuterol sulfate HFA, 2 puff, Inhalation, BID    And   ipratropium, 2 puff, Inhalation, BID   sodium chloride flush, 5-40 mL, IntraVENous, 2 times per day   enoxaparin, 30 mg, SubCUTAneous, BID   dexamethasone, 10 mg, IntraVENous, Q24H   vitamin D, 2,000 Units, Oral, Daily   budesonide-formoterol, 2 puff, Inhalation, BID   busPIRone, 5 mg, Oral, BID   calcium carbonate, 500 mg, Oral, Daily   levothyroxine, 125 mcg, Oral, Daily   montelukast, 10 mg, Oral, Nightly         MD Consults/Assessments & Plans:   ASSESSMENT AND PLAN         Active Hospital Problems     Diagnosis Date Noted   · Acute respiratory failure with hypoxia (HCC) [J96.01] 12/09/2021   · Pneumonia due to COVID-19 virus [U07.1, J12.82] 12/07/2021           Problems being addressed this admission:    Severe sepsis secondary to COVID-19 pneumonia / acute hypoxic respiratory failure / Acute on chronic COPD   12/10/2021 On admission patient was on high flow oxygen, placed on BiPAP, CT chest did not show any PE, patchy opacities bilaterally, procal low, CRP elevated, Pulmonology consult S/p toci, monoclonal antibodies Continue dexamethasone and PPI    12/11/2021 patient's oxygenation still borderline on BiPAP, continues to be followed by pulmonary above treatment plan continues.     12/12/2021 patient is off the BiPAP and also on high flow oxygen over 15 L/min overall looking better from yesterday continue with the present management plan. Pt on 15L NC, will check echo and CXR, wean oxygen as tolerated, quantiferon indeterminate, ID consulted       Mood disorder:    12/10/2021 buspirone       Hypothyroidism:    12/10/2021 levothyroxine       PAF ? / Hypotension   12/10/2021 continue diltiazem   12/11/2021 patient is on Cardizem 120 mg twice a day seems like an odd dose will consult cardiology for her may need to hold it for now as her blood pressure is marginal as per last EKG not in atrial fib has some sinus tach.  She probably has paroxysmal atrial fib.    12/12/2021 patient was seen by cardiology who does not think she has atrial fibrillation Cardizem was held then started on IV fluids and echo ordered.  Getting her down her IV fluids to 75 cc an hour is afraid she might going to failure. H/o HTN--was on dilt, stopped d/t hypotension, started on midodrine by cardio, monitor BP, encouraged PO fluids                           PT/OT Eval Status: deferred       DVT Prophylaxis: SQ lovenox   Diet: ADULT DIET; Regular   Code Status: Full Code           Dispo - echo with EF 55%, on 15L NC, f/u ID consult recs, wean oxygen as tolerated      PULM:   Acute Hypoxic resp failure   Bilateral Pneumonia sec to COVID-19   Sec. Hypothryroidism   Anxiety   Afib rate controlled           1. BIPAP   2. ICS   3. OOB   4. Decadron   5. Insulin   6. Inhalers   7. Keep sats > 92%   8. Prone ventilation as tolerated   9.  C.w present management      CARDIO:    Today's plan:  Echo is normal contineu care as per COVID 19 standpoint,  Start midodrine oral for hypotension and if blood pressure remains low, will recommend dopamine IV, repeat CXR, david has large b/l infiltrates and still recquies 100% oxygen      Assessment:  51 y. o.year old who is admitted for       Plan:   1. ECHO is normal LVEF, restart IVF   2. covid 19' Still requires 100% NRB   3. Hypotension: start oral midodrine 5mg PO q8 hrs   4.  All labs, medications and tests reviewed, continue all other medications of all above medical condition listed as is

## 2021-12-15 NOTE — PROGRESS NOTES
Hospitalist Progress Note      PCP: Ty Lopez MD    Date of Admission: 2021    Chief Complaint on Admission: SOB    Pt Seen/Examined and Chart Reviewed. Admitting dx covid pneumonia    SUBJECTIVE:   Seen at bedside this morning. Was hypotensive and restless. Started on fluids and cardiology started midodrine. BP now better    OBJECTIVE:   Vitals    TEMPERATURE:  Current - Temp: 97.1 °F (36.2 °C); Max - Temp  Av.9 °F (36.6 °C)  Min: 97.1 °F (36.2 °C)  Max: 98.2 °F (36.8 °C)  RESPIRATIONS RANGE: Resp  Av.9  Min: 20  Max: 33  PULSE RANGE: Pulse  Av  Min: 80  Max: 97  BLOOD PRESSURE RANGE:  Systolic (17IMZ), MOS:33 , Min:79 , UNK:227   ; Diastolic (82RYR), DWI:58, Min:53, Max:80    PULSE OXIMETRY RANGE: SpO2  Av.1 %  Min: 87 %  Max: 94 %  24HR INTAKE/OUTPUT:      Intake/Output Summary (Last 24 hours) at 12/15/2021 1851  Last data filed at 12/15/2021 1839  Gross per 24 hour   Intake --   Output 250 ml   Net -250 ml       Exam:  General appearance: Well, no apparent distress, appears stated age and cooperative. HEENT Normal cephalic, atraumatic without obvious deformity. Pupils equal, round, and reactive to light. Extra ocular muscles intact. Conjunctivae/corneas clear. Neck: Supple,Trachea midline without thyromegaly or adenopathy with full range of motion. Lungs: bilateral coarse breath sounds with few basal crackles, no wheezing  Heart: Regular rate and rhythm with Normal S1/S2 without  murmurs, rubs or gallops, point of maximum impulse non-displaced  Abdomen: Soft, non-tender or non-distended without rigidity or guarding and positive bowel sounds all four quadrants. Extremities: No clubbing, cyanosis, or edema bilaterally. Full range of motion without deformity and normal gait intact. Skin: Skin color, texture, turgor normal. No rashes or lesions. Neurologic: Alert and oriented X 3. Nonfocal.  Mental status: Alert, oriented, thought content appropriate.       Data    Recent Labs     12/13/21  1130 12/14/21  1356 12/15/21  1118   WBC 17.1* 13.4* 14.3*   HGB 14.5 14.5 14.5   HCT 43.2 42.2 41.9    204 192      Recent Labs     12/13/21  1130 12/14/21  1356 12/15/21  1118   * 135 136   K 4.0 3.5 3.7   CL 98* 99 100   CO2 24 23 24   BUN 15 15 20   CREATININE 0.7 0.7 0.7     No results for input(s): AST, ALT, ALB, BILIDIR, BILITOT, ALKPHOS in the last 72 hours. No results for input(s): INR in the last 72 hours. No results for input(s): CKTOTAL, CKMB, CKMBINDEX, TROPONINI in the last 72 hours. Imaging/Test Results    Consults:     IP CONSULT TO HOSPITALIST  IP CONSULT TO PULMONOLOGY  IP CONSULT TO CARDIOLOGY  IP CONSULT TO INFECTIOUS DISEASES    Allergies  Patient has no known allergies.     Medications      Scheduled Meds:   midodrine  10 mg Oral TID WC    albuterol sulfate HFA  2 puff Inhalation BID    And    ipratropium  2 puff Inhalation BID    sodium chloride flush  5-40 mL IntraVENous 2 times per day    enoxaparin  30 mg SubCUTAneous BID    dexamethasone  10 mg IntraVENous Q24H    vitamin D  2,000 Units Oral Daily    budesonide-formoterol  2 puff Inhalation BID    busPIRone  5 mg Oral BID    calcium carbonate  500 mg Oral Daily    levothyroxine  125 mcg Oral Daily    montelukast  10 mg Oral Nightly       Infusions:   lactated ringers Stopped (12/15/21 1540)    sodium chloride         PRN Meds:  sodium chloride, benzonatate, sodium chloride flush, sodium chloride, ondansetron **OR** ondansetron, polyethylene glycol, acetaminophen **OR** acetaminophen, guaiFENesin-dextromethorphan, albuterol sulfate HFA, hydrOXYzine, traZODone    ASSESSMENT AND PLAN      Active Hospital Problems    Diagnosis Date Noted    Acute respiratory failure with hypoxia ([facility] [J96.01] 12/09/2021    Pneumonia due to COVID-19 virus [U07.1, J12.82] 12/07/2021       Assessment /plan  Severe sepsis secondary to COVID-19 pneumonia  CT chest did not show any PE, only patchy opacities bilaterally. Procal low at 0.05. Slight increased to 0.09. Repeat in the morning  CRP elevated 178-11 now. Received Tocilizumab 12/09 and on Decadron  ID followed and does not recommend antibiotics. Leukocytosis likely secondary to steroids  Labs otherwise unremarkable  quantiferon indeterminate  Pulmonology following.     Acute hypoxic respiratory failure   Acute on chronic COPD  Continue oxygen and steroid  Pt on 15L NC. Echo good. Wean oxygen as able    Mood disorder:   On buspirone     Hypothyroidism:   On levothyroxine 125 mcg daily     PAF ? / Hypotension  Pt was on Cardizem 120 mg twice a day seems like an odd dose. Cardiology consulted. Saw pt and does not think she has atrial fibrillation Cardizem was held then started on IV fluids and echo done. Echo good    H/o HTN--was on dilt, stopped d/t hypotension, started on midodrine by cardio and dose increased today secondary to hypotension. Restarted IV fluid and will monitor      DVT Prophylaxis: SQ Lovenox    Diet: ADULT DIET; Regular  ADULT ORAL NUTRITION SUPPLEMENT; Breakfast, Lunch, Dinner; Standard High Calorie/High Protein Oral Supplement     Code Status: Full Code      Dispo -when oxygen need acceptable and mental status improved.     Stephanie Kee MD

## 2021-12-15 NOTE — PROGRESS NOTES
Infectious Disease Progress Note  12/15/2021   Patient Name: Nika Evans : 1970       Reason for visit: F/u COVID-19 pneumonia, acute respiratory failure? History:? Interval history noted  Denies n/v/d/f or untoward effects of antimicrobials  Physical Exam:  Vital Signs: BP (!) 79/53   Pulse 87   Temp 98 °F (36.7 °C) (Axillary)   Resp 22   Wt 162 lb (73.5 kg)   SpO2 (!) 87%   BMI 26.96 kg/m²     Gen: alert and oriented X3, HFNC  Skin: no stigmata of endocarditis  Wounds: C/D/I  HEMT: AT/NC Oropharynx pink, moist, and without lesions or exudates; dentition in good state of repair  Eyes: PERRLA, EOMI, conjunctiva pink, sclera anicteric. Neck: Supple. Trachea midline. No LAD. Chest:  Reduced air entry bilaterally  Heart: RRR  Abd: soft, non-distended, no tenderness, no hepatomegaly. Normoactive bowel sounds. Ext: no clubbing, cyanosis, or edema  Catheter Site: without erythema or tenderness  LDA: CVC:  Neuro: Mental status intact. CN 2-12 intact and no focal sensory or motor deficits     Radiologic / Imaging / TESTING  12/15/2021 6:15 am     COMPARISON:   2021 at 0448 hours     HISTORY:   ORDERING SYSTEM PROVIDED HISTORY: Hypoxia   TECHNOLOGIST PROVIDED HISTORY:   Reason for exam:->Hypoxia   Reason for Exam: Hypoxia     FINDINGS:   Progressive/radiographically worse consolidating infiltrate throughout both   lung fields worst in the right mid-basilar region. Findings are consistent   with diffuse pneumonitis, atypical pneumonia, pulmonary edema which may be of   cardiogenic or noncardiogenic etiology. No detectable pleural effusion or   pneumothorax. Heart size projects the upper limit of normal and appears   stable. Impression:  Findings consistent with progressive/radiographically worse diffuse   pneumonitis/pneumonia compared to 2021. Pulmonary edema of cardiogenic   or noncardiogenic etiology may contribute to this appearance.   No large areas   of pulmonary consolidation or detectable pleural effusions on the current   study.         Labs:    Recent Results (from the past 24 hour(s))   CBC auto differential    Collection Time: 12/14/21  1:56 PM   Result Value Ref Range    WBC 13.4 (H) 4.0 - 10.5 K/CU MM    RBC 4.68 4.2 - 5.4 M/CU MM    Hemoglobin 14.5 12.5 - 16.0 GM/DL    Hematocrit 42.2 37 - 47 %    MCV 90.2 78 - 100 FL    MCH 31.0 27 - 31 PG    MCHC 34.4 32.0 - 36.0 %    RDW 12.0 11.7 - 14.9 %    Platelets 958 547 - 138 K/CU MM    MPV 9.4 7.5 - 11.1 FL    Differential Type AUTOMATED DIFFERENTIAL     Segs Relative 92.9 (H) 36 - 66 %    Lymphocytes % 2.9 (L) 24 - 44 %    Monocytes % 1.5 0 - 4 %    Eosinophils % 0.7 0 - 3 %    Basophils % 0.2 0 - 1 %    Segs Absolute 12.4 K/CU MM    Lymphocytes Absolute 0.4 K/CU MM    Monocytes Absolute 0.2 K/CU MM    Eosinophils Absolute 0.1 K/CU MM    Basophils Absolute 0.0 K/CU MM    Nucleated RBC % 0.0 %    Total Nucleated RBC 0.0 K/CU MM    Total Immature Neutrophil 0.24 K/CU MM    Immature Neutrophil % 1.8 (H) 0 - 0.43 %   Basic metabolic panel    Collection Time: 12/14/21  1:56 PM   Result Value Ref Range    Sodium 135 135 - 145 MMOL/L    Potassium 3.5 3.5 - 5.1 MMOL/L    Chloride 99 99 - 110 mMol/L    CO2 23 21 - 32 MMOL/L    Anion Gap 13 4 - 16    BUN 15 6 - 23 MG/DL    CREATININE 0.7 0.6 - 1.1 MG/DL    Glucose 152 (H) 70 - 99 MG/DL    Calcium 8.3 8.3 - 10.6 MG/DL    GFR Non-African American >60 >60 mL/min/1.73m2    GFR African American >60 >60 mL/min/1.73m2     CULTURE results: Invalid input(s): BLOOD CULTURE,  URINE CULTURE, SURGICAL CULTURE    Diagnosis:  Patient Active Problem List   Diagnosis    Cough variant asthma    [facility]roid ca ([facility]    Adjustment disorder with anxious mood    Tachycardia    Essential hypertension    Abnormal mammogram    Adjustment insomnia    Anxiety    Pneumonia due to COVID-19 virus    Acute respiratory failure with hypoxia (HCC)       Active Problems  Active Problems:    Pneumonia due to COVID-19 virus Acute respiratory failure with hypoxia (HCC)  Resolved Problems:    * No resolved hospital problems. *      Impression and plan   Summary and rationale: Patient is a 45 y.o.  female medical history of anxiety/depression, hypertension, mild intermittent asthma thyroid cancer status post total thyroidectomy, received 1 dose of the Wolf Chinedu Covid vaccine on 10/29/2021 who was admitted 12/8/2021 for further evaluation and management of shortness of breath.     COVID-19 symptom onset:11/30/2021   COVID-19 test date: 12/4/2021   Expected discontinuation of isolation precautions: 20 days, end-date: 12/20/2021   Clinical status:day 16 of disease, clinically same- severe disease   Therapeutic:  o Ongoing antibiotics:  o Antiviral agent: remdesivir  o Immunomodulators:  - Dexamethasone: 12/8-  - Solu-Medrol: n/a  - Tocilizumab:received on 12/9/2021  - Baracitinib:  - IVIG:  - Enoxaparin 30 mg sc bid  o Completed antibiotics: n/a  o Other agents:    Diagnostic:   F/u:   Other:      Electronically signed by: Electronically signed by Obi Jacques MD on 12/15/2021 at 9:34 AM

## 2021-12-15 NOTE — PROGRESS NOTES
Comprehensive Nutrition Assessment    Type and Reason for Visit:  Initial (length of stay)    Nutrition Recommendations/Plan:   Continue regular diet with snacks as desires   Will offer oral nutrition supplement with meals   Encourage consistent meal and supplement intake   Will continue to follow up during stay     Nutrition Assessment:  Admit with hypoxia with COVID-19. Currently on regular diet with recent meal intake 25-50%. Noted patient requesting ensure supplement, will order supplement to provide with meals. Moderate nutrition risk at this time with predicted suboptimal intake. Limited wt hx during stay, stated wt on admit, no reported loss. Malnutrition Assessment:  Malnutrition Status: At risk for malnutrition (Comment)    Context:  Acute Illness       Estimated Daily Nutrient Needs:  Energy (kcal):  7792-8227 (25-30 felix/kg); Weight Used for Energy Requirements:  Current     Protein (g):  73-87 (1-1.2 g/kg); Weight Used for Protein Requirements:  Current        Fluid (ml/day):  2000; Method Used for Fluid Requirements:  1 ml/kcal      Nutrition Related Findings:  droplet plus isolation on COVID unit, hx thyroidectomy on synthroid, loss taste/smell, off bipap      Wounds:  None       Current Nutrition Therapies:    ADULT DIET; Regular    Anthropometric Measures:  · Height: 5' 5\" (165.1 cm)  · Current Body Weight: 162 lb 0.6 oz (73.5 kg)   · Usual Body Weight: 162 lb (73.5 kg) (per MD office visit in September)     · Ideal Body Weight: 125 lbs; % Ideal Body Weight 129.6 %   · BMI: 27  · Adjusted Body Weight:  ; No Adjustment   · BMI Categories: Overweight (BMI 25.0-29. 9)       Nutrition Diagnosis:   · Predicted inadequate energy intake related to altered taste perception, impaired respiratory function as evidenced by intake 26-50%      Nutrition Interventions:   Food and/or Nutrient Delivery:  Continue Current Diet, Start Oral Nutrition Supplement  Nutrition Education/Counseling:  No recommendation at this time   Coordination of Nutrition Care:  Continue to monitor while inpatient    Goals:  Patient will tolerate diet to consume at least 50-75% at meals during stay       Nutrition Monitoring and Evaluation:   Behavioral-Environmental Outcomes:  None Identified   Food/Nutrient Intake Outcomes:  Diet Advancement/Tolerance, Food and Nutrient Intake, Supplement Intake  Physical Signs/Symptoms Outcomes:  Biochemical Data, Meal Time Behavior, Skin, Weight     Discharge Planning:    Continue current diet, Continue Oral Nutrition Supplement     Electronically signed by Yomaira Dyer RD, URIEL on 12/15/21 at 10:26 AM EST    Contact: [phone]

## 2021-12-15 NOTE — PLAN OF CARE
Nutrition Problem #1: Predicted inadequate energy intake  Intervention: Food and/or Nutrient Delivery: Continue Current Diet, Start Oral Nutrition Supplement  Nutritional Goals: Patient will tolerate diet to consume at least 50-75% at meals during stay

## 2021-12-15 NOTE — PROGRESS NOTES
12/15/21 1420   NIV Type   NIV Started/Stopped On   Equipment Type v60   Mode Bilevel   Mask Type Full face mask   Mask Size Medium   Settings/Measurements   IPAP 12 cmH20   CPAP/EPAP 6 cmH2O   Rate Ordered 12   Resp 21   Insp Rise Time (%) 3 %   FiO2  75 %   I Time/ I Time % 1 s   Vt Exhaled 746 mL   Minute Volume 19.8 Liters   Mask Leak (lpm) 0 lpm   Comfort Level Good   Using Accessory Muscles No   SpO2 87   Alarm Settings   Alarms On Y   Press Low Alarm 3 cmH2O   High Pressure Alarm 20 cmH2O   Delay Alarm 20 sec(s)   Apnea (secs) 20 secs   Resp Rate Low Alarm 13   High Respiratory Rate 40 br/min

## 2021-12-15 NOTE — PROGRESS NOTES
12/15/21 1019   NIV Type   $NIV $Daily Charge   NIV Started/Stopped On   Equipment Type v60   Mode Bilevel   Mask Type Full face mask   Mask Size Medium   Settings/Measurements   IPAP 12 cmH20   CPAP/EPAP 6 cmH2O   Rate Ordered 12   Resp (!) 33   Insp Rise Time (%) 3 %   FiO2  100 %   I Time/ I Time % 1 s   Vt Exhaled 943 mL   Minute Volume 24.1 Liters   Mask Leak (lpm) 0 lpm   Comfort Level Fair   Using Accessory Muscles No   SpO2 94   Alarm Settings   Alarms On Y   Press Low Alarm 3 cmH2O   High Pressure Alarm 20 cmH2O   Delay Alarm 20 sec(s)   Apnea (secs) 20 secs   Resp Rate Low Alarm 13   High Respiratory Rate 40 br/min   decreased FIO2 to 90% will wean as tolerated

## 2021-12-16 NOTE — PROGRESS NOTES
Pt was intubated at 1420 due to increased work of breathing and low 02 saturations 86-87%. Vent settings are ACVC 16 500 100% +15 with 7.5 ETT tube and [employer].

## 2021-12-16 NOTE — CONSULTS
[address] [facility]  consulted by Dr. Adina Shah for monitoring and adjustment. Indication for treatment: Pneumonia (HAP)  Goal trough: 15-20 mcg/mL  AUC Goal:  400-600    Pertinent Laboratory Values:   Temp Readings from Last 3 Encounters:   12/16/21 97.3 °F (36.3 °C) (Axillary)   12/08/21 98 °F (36.7 °C) (Infrared)   09/23/21 98.8 °F (37.1 °C) (Oral)     Recent Labs     12/14/21  1356 12/15/21  1118   WBC 13.4* 14.3*     Recent Labs     12/14/21  1356 12/15/21  1118   BUN 15 20   CREATININE 0.7 0.7     Estimated Creatinine Clearance: 92 mL/min (based on SCr of 0.7 mg/dL). Intake/Output Summary (Last 24 hours) at 12/16/2021 1236  Last data filed at 12/16/2021 0604  Gross per 24 hour   Intake 629.36 ml   Output 775 ml   Net -145.64 ml       Pertinent Cultures:  Date    Source    Results  12/8   Covid-19   Positive  12/15   Resp    Ordered  12/15                          Blood                                       Ordered    Vancomycin level:   TROUGH:  No results for input(s): VANCOTROUGH in the last 72 hours. RANDOM:  No results for input(s): VANCORANDOM in the last 72 hours. Assessment:  WBC and temperature: WBC trending up to 14.3, pt afebrile  SCr, BUN, and urine output: SCR at baseline, UOP appears ok  Day(s) of therapy:  1  Vancomycin concentration:   12/18 - trough level scheduled for 00:30    Plan:  Start vancomycin 1000mg ivpb q12h for a predicted AUC of 488 at steady state. Check the vanco trough before the 4th dose  Pharmacy will continue to monitor patient and adjust therapy as indicated    [address] 12/18 @00:30    Thank you for the consult. Edd Tirado, [address] [place] [address]  12/16/2021 12:36 PM

## 2021-12-16 NOTE — FLOWSHEET NOTE
Patient tachycardic in the 140's-150's, hypertensive post intubation. Dr. Amelia Medrano notified. Orders received for Cardizem IVP and Lasix gtt. See eMAR.

## 2021-12-16 NOTE — CONSULTS
PICC Consult complete. Patient intubated and sedated. Consent signed by medical necessity. Time out done @ 1806. PICC inserted in RUE Basilic vessel while patient prone,  without difficulty per protocol. Placement verified via VPSG4 monitoring system. Good blood return and flushes easily on all three ports. Patient tolerated well. Education pamphlets left in chart  Ultrasound photos of vein diameter and doppler signature placed in chart. Please consult IV/PICC team if patient's needs change. PICC OK to use.

## 2021-12-16 NOTE — PROGRESS NOTES
Today's plan:  Echo is normal,repeat CXR is getting worse patietn has large b/l infiltrates and still recquies 100% oxygen, case discussed with Dr. Jonathan Hendricks, vancomycin and cefepime ordered, will sign off  Admit Date:  12/8/2021    Subjective: on 100% NRB      Chief complaints on admission  Chief Complaint   Patient presents with    Shortness of Breath     covid +, pt sent over from infusion clinic due to oxygen levels below 95%. due to have covid infusion today. History of present Rowdy Duggan is a 45 y. o.year old who  presents with had concerns including Shortness of Breath (covid +, pt sent over from infusion clinic due to oxygen levels below 95%. due to have covid infusion today. ). Past medical history:    has a past medical history of Abnormal mammogram, Cough variant asthma, COVID-19 virus infection, Depression, Migraines, Pulmonary nodules, Thyroid ca ([address] and Thyroid nodule. Past surgical history:   has a past surgical history that includes Thyroid surgery (9-25-15). Social History:   reports that she has never smoked. She has never used smokeless tobacco. She reports current alcohol use. She reports that she does not use drugs. Family history:  family history is not on file. No Known Allergies      Objective:   /78   Pulse 94   Temp 97.3 °F (36.3 °C) (Axillary)   Resp 24   Ht 5' 5\" (1.651 m)   Wt 153 lb 10.6 oz (69.7 kg)   SpO2 91%   BMI 25.57 kg/m²       Intake/Output Summary (Last 24 hours) at 12/16/2021 1209  Last data filed at 12/16/2021 0604  Gross per 24 hour   Intake 629.36 ml   Output 775 ml   Net -145.64 ml       TELEMETRY: Sinus     Physical Exam:  Constitutional:  Well developed, Well nourished, No acute distress, Non-toxic appearance. HENT:  Normocephalic, Atraumatic, Bilateral external ears normal, Oropharynx moist, No oral exudates, Nose normal. Neck- Normal range of motion, No tenderness, Supple, No stridor.    Eyes:  PERRL, EOMI, Conjunctiva normal, No discharge. Respiratory:  Normal breath sounds, No respiratory distress, No wheezing, No chest tenderness. ,no use of accessory muscles, diaphragm movement is normal  Cardiovascular: (PMI) apex non displaced,no lifts no thrills, no s3,no s4, Normal heart rate, Normal rhythm, No murmurs, No rubs, No gallops. Carotid arteries pulse and amplitude are normal no bruit, no abdominal bruit noted ( normal abdominal aorta ausculation), femoral arteries pulse and amplitude are normal no bruit, pedal pulses are normal  GI:  Bowel sounds normal, Soft, No tenderness, No masses, No pulsatile masses. : External genitalia appear normal, No masses or lesions. No discharge. No CVA tenderness. Musculoskeletal:  Intact distal pulses, No edema, No tenderness, No cyanosis, No clubbing. Good range of motion in all major joints. No tenderness to palpation or major deformities noted. Back- No tenderness. Integument:  Warm, Dry, No erythema, No rash. Lymphatic:  No lymphadenopathy noted. Neurologic:  Alert & oriented x 3, Normal motor function, Normal sensory function, No focal deficits noted.    Psychiatric:  Affect normal, Judgment normal, Mood normal.     Medications:    enoxaparin  1 mg/kg SubCUTAneous BID    midodrine  10 mg Oral TID WC    albuterol sulfate HFA  2 puff Inhalation BID    And    ipratropium  2 puff Inhalation BID    sodium chloride flush  5-40 mL IntraVENous 2 times per day    dexamethasone  10 mg IntraVENous Q24H    vitamin D  2,000 Units Oral Daily    budesonide-formoterol  2 puff Inhalation BID    busPIRone  5 mg Oral BID    calcium carbonate  500 mg Oral Daily    levothyroxine  125 mcg Oral Daily    montelukast  10 mg Oral Nightly      dexmedetomidine (PRECEDEX) IV infusion 0.3 mcg/kg/hr (12/16/21 1136)    sodium chloride       sodium chloride, benzonatate, sodium chloride flush, sodium chloride, ondansetron **OR** ondansetron, polyethylene glycol, acetaminophen **OR** acetaminophen,

## 2021-12-16 NOTE — PROGRESS NOTES
Rapid response called as hypoxic in 58 and bagging at bedside and had ventilator dyschronicity and ordered paralytic and also drip short term. PICC line ordered. Decided to prone with improvement of oxygen in 80s.  CXR pending

## 2021-12-16 NOTE — ANESTHESIA PROCEDURE NOTES
Airway  Date/Time: 12/16/2021 2:46 PM  Urgency: urgent    Airway not difficult    General Information and Staff    Patient location during procedure: ICU  Anesthesiologist: Dio Lawrence MD  Resident/CRNA: DOLORES Chavez CRNA  Performed: resident/CRNA     Consent for Airway (if performed for an anesthetic, see related documentation for consents)  Patient identity confirmed: per hospital policy  Consent: No emergent situation. Verbal consent obtained.   Risks and benefits: risks, benefits and alternatives were discussed  Consent given by: patient      Code status verified:yes  Indications and Patient Condition  Indications for airway management: respiratory failure  Spontaneous ventilation: present  Sedation level: deep  Preoxygenated: yes  Patient position: sniffing  MILS not maintained throughout  Mask difficulty assessment: not attempted    Final Airway Details  Final airway type: endotracheal airway      Successful airway: ETT  Cuffed: yes   Successful intubation technique: video laryngoscopy  Facilitating devices/methods: intubating stylet  Endotracheal tube insertion site: oral  Blade: [place] (urban)  Blade size: #3  ETT size (mm): 7.5  Placement verified by: chest auscultation, capnometry and palpation of cuff   Measured from: lips  ETT to lips (cm): 23  Number of attempts at approach: 1  Number of other approaches attempted: 0    no

## 2021-12-16 NOTE — PROGRESS NOTES
Infectious Disease Progress Note  2021   Patient Name: Paris Habermann : 1970       Reason for visit: F/u COVID-19 pneumonia, acute respiratory failure? History:? Interval history noted  Denies n/v/d/f or untoward effects of antimicrobials  Physical Exam:  Vital Signs: /78   Pulse 106   Temp 97.3 °F (36.3 °C) (Axillary)   Resp 25   Ht 5' 5\" (1.651 m)   Wt 153 lb 10.6 oz (69.7 kg)   SpO2 91%   BMI 25.57 kg/m²     Gen: alert and oriented X3, HFNC  Skin: no stigmata of endocarditis  Wounds: C/D/I  HEMT: AT/NC Oropharynx pink, moist, and without lesions or exudates; dentition in good state of repair  Eyes: PERRLA, EOMI, conjunctiva pink, sclera anicteric. Neck: Supple. Trachea midline. No LAD. Chest:  Reduced air entry bilaterally  Heart: RRR  Abd: soft, non-distended, no tenderness, no hepatomegaly. Normoactive bowel sounds. Ext: no clubbing, cyanosis, or edema  Catheter Site: without erythema or tenderness  LDA: CVC:  Neuro: Mental status intact. CN 2-12 intact and no focal sensory or motor deficits     Radiologic / Imaging / TESTING  12/15/2021 6:15 am     COMPARISON:   2021 at 0448 hours     HISTORY:   ORDERING SYSTEM PROVIDED HISTORY: Hypoxia   TECHNOLOGIST PROVIDED HISTORY:   Reason for exam:->Hypoxia   Reason for Exam: Hypoxia     FINDINGS:   Progressive/radiographically worse consolidating infiltrate throughout both   lung fields worst in the right mid-basilar region. Findings are consistent   with diffuse pneumonitis, atypical pneumonia, pulmonary edema which may be of   cardiogenic or noncardiogenic etiology. No detectable pleural effusion or   pneumothorax. Heart size projects the upper limit of normal and appears   stable. Impression:  Findings consistent with progressive/radiographically worse diffuse   pneumonitis/pneumonia compared to 2021. Pulmonary edema of cardiogenic   or noncardiogenic etiology may contribute to this appearance.   No large areas   of pulmonary consolidation or detectable pleural effusions on the current   study.         Labs:    Recent Results (from the past 24 hour(s))   Blood gas, arterial    Collection Time: 12/15/21 10:00 AM   Result Value Ref Range    pH, Bld 7.45 7.34 - 7.45    pCO2, Arterial 36.0 32 - 45 MMHG    pO2, Arterial 85 75 - 100 MMHG    Base Exc, Mixed 1.3 0 - 2.3    HCO3, Arterial 25.0 (H) 18 - 23 MMOL/L    CO2 Content 26.1 (H) 19 - 24 MMOL/L    O2 Sat 94.9 (L) 96 - 97 %    Carbon Monoxide, Blood 1.5 0 - 5 %    Methemoglobin, Arterial 1.5 (H) <1.5 %    Comment 12/6 .90    CBC auto differential    Collection Time: 12/15/21 11:18 AM   Result Value Ref Range    WBC 14.3 (H) 4.0 - 10.5 K/CU MM    RBC 4.58 4.2 - 5.4 M/CU MM    Hemoglobin 14.5 12.5 - 16.0 GM/DL    Hematocrit 41.9 37 - 47 %    MCV 91.5 78 - 100 FL    MCH 31.7 (H) 27 - 31 PG    MCHC 34.6 32.0 - 36.0 %    RDW 12.2 11.7 - 14.9 %    Platelets 239 941 - 529 K/CU MM    MPV 9.7 7.5 - 11.1 FL    Bands Relative 2 (L) 5 - 11 %    Segs Relative 89.0 (H) 36 - 66 %    Eosinophils % 1.0 0 - 3 %    Lymphocytes % 3.0 (L) 24 - 44 %    Monocytes % 5.0 (H) 0 - 4 %    Bands Absolute 0.29 K/CU MM    Segs Absolute 12.8 K/CU MM    Eosinophils Absolute 0.1 K/CU MM    Lymphocytes Absolute 0.4 K/CU MM    Monocytes Absolute 0.7 K/CU MM    Differential Type MANUAL DIFFERENTIAL     Toxic Granulation PRESENT    Basic metabolic panel    Collection Time: 12/15/21 11:18 AM   Result Value Ref Range    Sodium 136 135 - 145 MMOL/L    Potassium 3.7 3.5 - 5.1 MMOL/L    Chloride 100 99 - 110 mMol/L    CO2 24 21 - 32 MMOL/L    Anion Gap 12 4 - 16    BUN 20 6 - 23 MG/DL    CREATININE 0.7 0.6 - 1.1 MG/DL    Glucose 156 (H) 70 - 99 MG/DL    Calcium 8.4 8.3 - 10.6 MG/DL    GFR Non-African American >60 >60 mL/min/1.73m2    GFR African American >60 >60 mL/min/1.73m2   C-Reactive Protein    Collection Time: 12/15/21 11:18 AM   Result Value Ref Range    CRP, High Sensitivity 11.7 mg/L   Procalcitonin    Collection Time: 12/15/21 11:18 AM   Result Value Ref Range    Procalcitonin 0.092      CULTURE results: Invalid input(s): BLOOD CULTURE,  URINE CULTURE, SURGICAL CULTURE    Diagnosis:  Patient Active Problem List   Diagnosis    Cough variant asthma    Thyroid ca ([address]    Adjustment disorder with anxious mood    Tachycardia    Essential hypertension    Abnormal mammogram    Adjustment insomnia    Anxiety    Pneumonia due to COVID-19 virus    Acute respiratory failure with hypoxia (HCC)       Active Problems  Active Problems:    Pneumonia due to COVID-19 virus    Acute respiratory failure with hypoxia (HCC)  Resolved Problems:    * No resolved hospital problems. *      Impression and plan   Summary and rationale: Patient is a 45 y.o.  female medical history of anxiety/depression, hypertension, mild intermittent asthma thyroid cancer status post total thyroidectomy, received 1 dose of the [place] Covid vaccine on 10/29/2021 who was admitted 12/8/2021 for further evaluation and management of shortness of breath.  COVID-19 symptom onset:11/30/2021   COVID-19 test date: 12/4/2021   Expected discontinuation of isolation precautions: 20 days, end-date: 12/20/2021   Clinical status:day 17 of disease, clinically same- severe disease   Therapeutic:  o Ongoing antibiotics:prescribed cefepime by hospitalist  o Antiviral agent: remdesivir  o Immunomodulators:  - Dexamethasone: 12/8-  - Solu-Medrol: n/a  - Tocilizumab:received on 12/9/2021  - Baracitinib:  - IVIG:  - Enoxaparin 30 mg sc bid  o Completed antibiotics: n/a  o Other agents:    Diagnostic:   F/u:   Other:spoke to patient and , she agrees to intubated.        Electronically signed by: Electronically signed by Bharath Pennington MD on 12/16/2021 at 9:25 AM

## 2021-12-16 NOTE — PROGRESS NOTES
Pulmonary and Critical Care  Progress Note      VITALS:  /79   Pulse 101   Temp 97.3 °F (36.3 °C) (Axillary)   Resp (!) 32   Ht 5' 5\" (1.651 m)   Wt 153 lb 10.6 oz (69.7 kg)   SpO2 90%   BMI 25.57 kg/m²     Subjective:   CHIEF COMPLAINT :SOB     HPI:                The patient is lying in the bed. She is still on high fio2. She is in mild resp distress     Objective:   PHYSICAL EXAM:    LUNGS:Occasional basal crackles  Abd-soft, BS+,NT  Ext- no pedal edema  CVS-s1s2, no murmurs      DATA:    CBC:  Recent Labs     12/13/21  1130 12/14/21  1356 12/15/21  1118   WBC 17.1* 13.4* 14.3*   RBC 4.67 4.68 4.58   HGB 14.5 14.5 14.5   HCT 43.2 42.2 41.9    204 192   MCV 92.5 90.2 91.5   MCH 31.0 31.0 31.7*   MCHC 33.6 34.4 34.6   RDW 12.0 12.0 12.2   SEGSPCT 90.8* 92.9* 89.0*   BANDSPCT  --   --  2*      BMP:  Recent Labs     12/13/21  1130 12/14/21  1356 12/15/21  1118   * 135 136   K 4.0 3.5 3.7   CL 98* 99 100   CO2 24 23 24   BUN 15 15 20   CREATININE 0.7 0.7 0.7   CALCIUM 8.1* 8.3 8.4   GLUCOSE 91 152* 156*      ABG:  Recent Labs     12/15/21  1000   PH 7.45   PO2ART 85   NSV8TIF 36.0   O2SAT 94.9*     BNP  No results found for: BNP   D-Dimer:  Lab Results   Component Value Date    DDIMER 544 (H) 12/10/2021      1. Radiology: Pending      Assessment/Plan     Patient Active Problem List    Diagnosis Date Noted    Acute respiratory failure with hypoxia ([facility] [address] 12/09/2021    Pneumonia due to COVID-19 virus 12/07/2021     Overview Note:     Tested positive on 12/4/2021      Anxiety 12/28/2020     Overview Note:     Patient has anxiety. She was taking Vistaril as needed but states he still feels anxious. BuSpar 5 mg twice a day      Adjustment insomnia 09/23/2020     Overview Note:     Pt has insomnia because of anxiety and stress  Trial of trazodone 50 mg daily . Side effects explained.  Abnormal mammogram 09/24/2019     Overview Note:     9/24/19 : abnormal : repeat in 6 months.  Pt was informed by radiologist   Bert Spears in 3/20 was normal.  Patient is followed by gynecologist  1/7/21 : Earnstine Beards normal at [address] Tachycardia 06/26/2018    Essential hypertension 06/26/2018     Overview Note:     Patient has hypertension. Pt is on diltiazem  mg bid       Adjustment disorder with anxious mood 03/23/2018     Overview Note:     Pt wants to try vistaril prn.  Thyroid ca ([address] 12/15/2015     Overview Note:     Had total thyroidectomy by Dr Brendan Small. On synthroid. Managed by Dr Sarbjit Barraza.  -he increased synthroid to 125 mcg daily except  1 1/2 tablet on Sundays  Pt has f/u appointment in 11/2021      Cough variant asthma      Overview Note:     On, albuterol and singulair. Pt is on Symbicort: feels well. Acute Hypoxic resp failure sec to Cardiogenic and non cardiogenic Pulmonary edema  Bilateral Pneumonia sec to COVID-19  Sec. Hypothryroidism  Anxiety  Afib rate controlled       1. IV fluids  2. CXR now  3. Keep sats > 92%  4. ICS  5. OOB  6. Decadron  7. Insulin  8. Inhalers  9. Keep sats > 88%  10. Empiric Abx  11. F/u C&S  12. Prone ventilation as tolerated  13. C/w present management  14.  Intubate as patient is requesting    Electronically signed by Phyllis Hanley MD on 12/16/2021 at 10:37 AM

## 2021-12-16 NOTE — PROGRESS NOTES
Was called to this pt.'s room due to 02 saturations in the 60's. Upon walking into pt.'s room SANTIAGO Prater was bagging pt. Pt was coughing and gagging on ET tube. A Rapid Response was called and Dr. Bartolome Lee ordered a paralytic so pt could become more synchronous with the vent. Once pt became more stable she was proned and placed on pressure control. Current vent settings: ACPC 18  RR 18  FI02 100%  PEEP 18. Will continue to monitor this pt.

## 2021-12-16 NOTE — PROGRESS NOTES
12/16/21 0939   NIV Type   $NIV $Daily Charge   NIV Started/Stopped On   Equipment Type v60   Mode Bilevel   Mask Type Full face mask   Mask Size Small   Settings/Measurements   IPAP 12 cmH20   CPAP/EPAP 6 cmH2O   Rate Ordered 12   Resp (!) 32   Insp Rise Time (%) 3 %   FiO2  100 %   I Time/ I Time % 1 s   Vt Exhaled 1001 mL   Minute Volume 29.6 Liters   Mask Leak (lpm) 0 lpm   Comfort Level Good   Using Accessory Muscles No   SpO2 90   Breath Sounds   Right Upper Lobe Diminished   Right Middle Lobe Diminished   Right Lower Lobe Diminished   Left Upper Lobe Diminished   Left Lower Lobe Diminished   Alarm Settings   Alarms On Y   Press Low Alarm 5 cmH2O   High Pressure Alarm 25 cmH2O   Delay Alarm 20 sec(s)   Apnea (secs) 20 secs   Resp Rate Low Alarm 13   High Respiratory Rate 50 br/min

## 2021-12-16 NOTE — PROGRESS NOTES
Hospitalist Progress Note      PCP: Jaylyn Ochoa MD    Date of Admission: 2021    Chief Complaint on Admission: SOB    Pt Seen/Examined and Chart Reviewed. Admitting dx covid pneumonia    SUBJECTIVE:   Seen at bedside this morning. Was very anxious and on BIPAP 100% FIO2. Tried to discuss possibility of  intubation with her but she stated 'am afraid' and became tearful. Was not able to make the decision. Anxiety was not responding to vistaril or ativan so was started on precedex. She continued with increased work of breathing and was intubated at [id number] for that plus O2 sat in the 86-87 range on 100%fio2. She was tachycardic and cardiology ordered Cardizem iv and started her on lasix drip. At about 3.15 pm, Rapid response called as pt was hypoxic 58 and bagging at bedside and had ventilator dyschronicity . Was started on and ordered paralytic bolus and and also drip short term. PICC line ordered. Proned pt with improvement of oxygen . OBJECTIVE:   Vitals    TEMPERATURE:  Current - Temp: 101.5 °F (38.6 °C); Max - Temp  Av.7 °F (37.1 °C)  Min: 97.2 °F (36.2 °C)  Max: 101.5 °F (38.6 °C)  RESPIRATIONS RANGE: Resp  Av.9  Min: 18  Max: 37  PULSE RANGE: Pulse  Av.6  Min: 69  Max: 127  BLOOD PRESSURE RANGE:  Systolic (71YHX), DKP:408 , Min:71 , QFQ:227   ; Diastolic (33MTE), IIR:46, Min:46, Max:91    PULSE OXIMETRY RANGE: SpO2  Av.7 %  Min: 59 %  Max: 95 %  24HR INTAKE/OUTPUT:      Intake/Output Summary (Last 24 hours) at 2021 1800  Last data filed at 2021 1725  Gross per 24 hour   Intake 629.36 ml   Output 1225 ml   Net -595.64 ml       Exam:  General appearance: Intubated and sedated on vent. HEENT Normal cephalic, atraumatic without obvious deformity. Neck: Supple,Trachea midline without thyromegaly or adenopathy with full range of motion. Lungs: bilateral coarse breath sounds with few basal crackles, no wheezing  Heart: Regular rhythm ,tachycardic.   Abdomen: Soft, non-tender or non-distended without rigidity or guarding and positive bowel sounds all four quadrants. Extremities: No clubbing, cyanosis, or edema bilaterally. Skin: Skin color, texture, turgor normal. No rashes or lesions. Neurologic: Sedated. Data    Recent Labs     12/14/21  1356 12/15/21  1118   WBC 13.4* 14.3*   HGB 14.5 14.5   HCT 42.2 41.9    192      Recent Labs     12/14/21  1356 12/15/21  1118    136   K 3.5 3.7   CL 99 100   CO2 23 24   BUN 15 20   CREATININE 0.7 0.7     No results for input(s): AST, ALT, ALB, BILIDIR, BILITOT, ALKPHOS in the last 72 hours. No results for input(s): INR in the last 72 hours. No results for input(s): CKTOTAL, CKMB, CKMBINDEX, TROPONINI in the last 72 hours. Imaging/Test Results    Consults:     IP CONSULT TO HOSPITALIST  IP CONSULT TO PULMONOLOGY  IP CONSULT TO CARDIOLOGY  IP CONSULT TO INFECTIOUS DISEASES  PHARMACY TO DOSE VANCOMYCIN  IP CONSULT TO DIETITIAN  IP CONSULT TO PHARMACY    Allergies  Patient has no known allergies.     Medications      Scheduled Meds:   enoxaparin  1 mg/kg SubCUTAneous BID    cefepime  2,000 mg IntraVENous Q12H    vancomycin  1,000 mg IntraVENous Q12H    chlorhexidine  15 mL Mouth/Throat BID    famotidine (PEPCID) injection  20 mg IntraVENous BID    oxymetazoline  2 spray Each Nostril Once    lidocaine   Topical Once    ipratropium  4 puff Inhalation Q4H    And    albuterol sulfate HFA  4 puff Inhalation Q4H    lidocaine  5 mL IntraDERmal Once    sodium chloride flush  5-40 mL IntraVENous 2 times per day    midodrine  10 mg Oral TID WC    dexamethasone  10 mg IntraVENous Q24H    vitamin D  2,000 Units Oral Daily    busPIRone  5 mg Oral BID    calcium carbonate  500 mg Oral Daily    levothyroxine  125 mcg Oral Daily    montelukast  10 mg Oral Nightly       Infusions:   dexmedetomidine (PRECEDEX) IV infusion Stopped (12/16/21 1445)    fentaNYL 50 mcg/hr (12/16/21 1626)    propofol 70 mcg/kg/min (12/16/21 1750)    furosemide (LASIX) 1mg/ml infusion 5 mg/hr (12/16/21 1658)    sodium chloride      vecuronium (NORCURON) infusion 0.5 mcg/kg/min (12/16/21 1718)       PRN Meds:  sodium chloride flush, sodium chloride, sodium chloride, benzonatate, ondansetron **OR** ondansetron, polyethylene glycol, acetaminophen **OR** acetaminophen, guaiFENesin-dextromethorphan, hydrOXYzine, traZODone    ASSESSMENT AND PLAN      Active Hospital Problems    Diagnosis Date Noted    Acute respiratory failure with hypoxia ([facility] [address] [J96.01] 12/09/2021    Pneumonia due to COVID-19 virus [U07.1, J12.82] 12/07/2021       Assessment /Plan    Acute hypoxic respiratory failure  -Was still hypoxic on 1005 BiPAP and also with increased work of breathing  -Intubated this afternoon. Currently on 100% FIO2. Episode of hypoxia sec to vent dysynchrony resolved with paralytic and proning. Continue to monitor. Pulmonary following. Repeat labs now. Severe sepsis secondary to COVID-19 pneumonia  CT chest did not show any PE, only patchy opacities bilaterally. Procal low at 0.05. Slight increased to 0.09. Repeat labs not done. CRP elevated 178-11 now. Received Tocilizumab 12/09 and on Decadron. ID followed and does not recommend antibiotics. Leukocytosis likely secondary to steroids  Labs otherwise unremarkable  Quantiferon indeterminate  Pulmonology following. Vanc/Cefepime started by cardio 12/16. Mood disorder:   On buspirone     Hypothyroidism:   On levothyroxine 125 mcg daily     PAF ? / Hypotension  Pt was on Cardizem 120 mg twice a day seems like an odd dose. Cardiology consulted. Saw pt and does not think she has atrial fibrillation Cardizem was held then started on IV fluids and echo done. Echo good. Hypotension resolved. Cardiology following and she was given iv Cardizem today for tachycardia and started on lasix drip.   CXR 12/16 with severe extensive patchy bilateral airspace opacities compatible with COVID pneumonia. Continue to monitor. H/o HTN--was on diltiazem, stopped d/t hypotension. Started on midodrine by cardio and dose increased 12/15 secondary to hypotension. DVT Prophylaxis: SQ Lovenox full dose. Diet: Diet NPO     Code Status: Full Code     Critically ill pt. Critical care time spent 40 mins.     Guerita Walden MD  Hospitalist

## 2021-12-16 NOTE — PROGRESS NOTES
Today's plan:  Echo is normal,repeat CXR is getting worse patietn has large b/l infiltrates and still recquies 100% oxygen, case discussed with Dr. Adriana Hicks, vancomycin and cefepime ordered, will sign off  Admit Date:  12/8/2021    Subjective: on 100% NRB      Chief complaints on admission  Chief Complaint   Patient presents with    Shortness of Breath     covid +, pt sent over from infusion clinic due to oxygen levels below 95%. due to have covid infusion today. History of present Lluvia Hickman is a 45 y. o.year old who  presents with had concerns including Shortness of Breath (covid +, pt sent over from infusion clinic due to oxygen levels below 95%. due to have covid infusion today. ). Past medical history:    has a past medical history of Abnormal mammogram, Cough variant asthma, COVID-19 virus infection, Depression, Migraines, Pulmonary nodules, Thyroid ca ([address] and Thyroid nodule. Past surgical history:   has a past surgical history that includes Thyroid surgery (9-25-15). Social History:   reports that she has never smoked. She has never used smokeless tobacco. She reports current alcohol use. She reports that she does not use drugs. Family history:  family history is not on file. No Known Allergies      Objective:   /78   Pulse 94   Temp 97.3 °F (36.3 °C) (Axillary)   Resp 24   Ht 5' 5\" (1.651 m)   Wt 153 lb 10.6 oz (69.7 kg)   SpO2 91%   BMI 25.57 kg/m²       Intake/Output Summary (Last 24 hours) at 12/16/2021 1216  Last data filed at 12/16/2021 0604  Gross per 24 hour   Intake 629.36 ml   Output 775 ml   Net -145.64 ml       TELEMETRY: Sinus     Physical Exam:  Constitutional:  Well developed, Well nourished, No acute distress, Non-toxic appearance. HENT:  Normocephalic, Atraumatic, Bilateral external ears normal, Oropharynx moist, No oral exudates, Nose normal. Neck- Normal range of motion, No tenderness, Supple, No stridor.    Eyes:  PERRL, EOMI, Conjunctiva normal, No discharge. Respiratory:  Normal breath sounds, No respiratory distress, No wheezing, No chest tenderness. ,no use of accessory muscles, diaphragm movement is normal  Cardiovascular: (PMI) apex non displaced,no lifts no thrills, no s3,no s4, Normal heart rate, Normal rhythm, No murmurs, No rubs, No gallops. Carotid arteries pulse and amplitude are normal no bruit, no abdominal bruit noted ( normal abdominal aorta ausculation), femoral arteries pulse and amplitude are normal no bruit, pedal pulses are normal  GI:  Bowel sounds normal, Soft, No tenderness, No masses, No pulsatile masses. : External genitalia appear normal, No masses or lesions. No discharge. No CVA tenderness. Musculoskeletal:  Intact distal pulses, No edema, No tenderness, No cyanosis, No clubbing. Good range of motion in all major joints. No tenderness to palpation or major deformities noted. Back- No tenderness. Integument:  Warm, Dry, No erythema, No rash. Lymphatic:  No lymphadenopathy noted. Neurologic:  Alert & oriented x 3, Normal motor function, Normal sensory function, No focal deficits noted.    Psychiatric:  Affect normal, Judgment normal, Mood normal.     Medications:    enoxaparin  1 mg/kg SubCUTAneous BID    cefepime  2,000 mg IntraVENous Q12H    vancomycin  1,000 mg IntraVENous Q12H    midodrine  10 mg Oral TID WC    albuterol sulfate HFA  2 puff Inhalation BID    And    ipratropium  2 puff Inhalation BID    sodium chloride flush  5-40 mL IntraVENous 2 times per day    dexamethasone  10 mg IntraVENous Q24H    vitamin D  2,000 Units Oral Daily    budesonide-formoterol  2 puff Inhalation BID    busPIRone  5 mg Oral BID    calcium carbonate  500 mg Oral Daily    levothyroxine  125 mcg Oral Daily    montelukast  10 mg Oral Nightly      dexmedetomidine (PRECEDEX) IV infusion 0.4 mcg/kg/hr (12/16/21 1206)    sodium chloride       sodium chloride, benzonatate, sodium chloride flush, sodium chloride, ondansetron

## 2021-12-16 NOTE — PROGRESS NOTES
12/16/21 1501   Vent Information   $Ventilation $Initial Day   Vent Mode AC/VC   Vt Ordered 500 mL   Rate Set 16 bmp   FiO2  100 %   SpO2 92 %   SpO2/FiO2 ratio 92   Sensitivity 3   PEEP/CPAP 15   Humidification Source HME   Nitric Oxide/Epoprostenol In Use?  No   Vent Patient Data   Peak Inspiratory Pressure 27 cmH2O   Mean Airway Pressure 19 cmH20   Rate Measured 16 br/min   Vt Exhaled 479 mL   Minute Volume 7.5 Liters   I:E Ratio 1:2.4   Plateau Pressure 25 FHS40   Static Compliance 53 mL/cmH2O   Total PEEP 16 cmH20   Auto PEEP 0.6 cmH20   Breath Sounds   Right Upper Lobe Diminished   Right Middle Lobe Diminished   Right Lower Lobe Diminished   Left Upper Lobe Diminished   Left Lower Lobe Diminished   Additional Respiratory  Assessments   Resp 19   Oral Care Mouth suctioned   Alarm Settings   High Pressure Alarm 40 cmH2O   Delay Alarm 20 sec(s)   Low Minute Volume Alarm 2.5 L/min   Apnea (secs) 20 secs   High Respiratory Rate 40 br/min   Low Exhaled Vt  250 mL   Non-Surgical Airway 12/16/21 Endo Tracheal Tube   Placement Date/Time: 12/16/21 6305   Placed By: Licensed provider  Inserted by: Sarina Sandoval CRNA   Insertion attempts: 1  Airway Device: Endo Tracheal Tube  Size: 7.5   $ Intubation emergent  $ Yes   Secured at 23 cm   Measured From Lips   Secured Location Right   Secured By Commercial tube wren   Site Condition Dry

## 2021-12-16 NOTE — FLOWSHEET NOTE
Attempted to put patient on Airvo at [facility], 92% for med admin, meal.  Patient did not tolerate, almost immediately dropped to 78%, patient placed back on bipap at 100% fiO2. Will continue to monitor.

## 2021-12-17 NOTE — CONSULTS
[address] [facility]  consulted by Dr. Natalie Nieves for monitoring and adjustment. Indication for treatment: Pneumonia (HAP)  Goal trough: 15-20 mcg/mL  AUC Goal:  400-600    Pertinent Laboratory Values:   Temp Readings from Last 3 Encounters:   12/17/21 100.9 °F (38.3 °C) (Rectal)   12/08/21 98 °F (36.7 °C) (Infrared)   09/23/21 98.8 °F (37.1 °C) (Oral)     Recent Labs     12/15/21  1118 12/17/21  0500 12/17/21  1238   WBC 14.3* 23.6* 21.9*     Recent Labs     12/15/21  1118 12/17/21  0500   BUN 20 27*   CREATININE 0.7 1.0     Estimated Creatinine Clearance: 65 mL/min (based on SCr of 1 mg/dL). Intake/Output Summary (Last 24 hours) at 12/17/2021 1715  Last data filed at 12/17/2021 0731  Gross per 24 hour   Intake 959.25 ml   Output 1185 ml   Net -225.75 ml       Pertinent Cultures:  Date    Source    Results  12/8   Covid-19   Positive  12/15   Resp    Ordered  12/15                          Blood                                       Ordered    Vancomycin level:   TROUGH:  No results for input(s): VANCOTROUGH in the last 72 hours. RANDOM:  No results for input(s): VANCORANDOM in the last 72 hours. Assessment:  · WBC and temperature: WBC 21.9;  Tmax = 100.9F  · SCr, BUN, and urine output:   · Scr with increase to 1 and BUN slight increase  · Plan to follow closely  · Day(s) of therapy: 2  · Vancomycin concentration:   · 12/18 - trough level scheduled for 00:30    Plan:  · Continue vancomycin 1000mg ivpb q12h for a predicted AUC of 488 at steady state  · Check the vanco trough before the 4th dose  · Pharmacy will continue to monitor patient and adjust therapy as indicated    VANCOMYCIN CONCENTRATION SCHEDULED FOR 12/18 @00:30    Thank you for the consult,  Jurgen Reed, [address] [place] [address]  12/17/2021 5:15 PM

## 2021-12-17 NOTE — PROGRESS NOTES
Hospitalist Progress Note      PCP: Jennifer Flores MD    Date of Admission: 2021    Chief Complaint on Admission: SOB    Pt Seen/Examined and Chart Reviewed. Admitting dx covid pneumonia    SUBJECTIVE:   Seen at bedside this morning. Intubated and on vent. Sedated. No reported events overnight. Noted she had multiple episodes of elevated temp. Tmax 101.5. OBJECTIVE:   Vitals    TEMPERATURE:  Current - Temp: 100.9 °F (38.3 °C); Max - Temp  Av.4 °F (38 °C)  Min: 99.4 °F (37.4 °C)  Max: 101.5 °F (38.6 °C)  RESPIRATIONS RANGE: Resp  Av.4  Min: 18  Max: 24  PULSE RANGE: Pulse  Av.3  Min: 99  Max: 135  BLOOD PRESSURE RANGE:  Systolic (64BCT), UMS:370 , Min:69 , UYM:436    Diastolic (64ILK), MEQ:80, Min:45, Max:85    PULSE OXIMETRY RANGE: SpO2  Av.9 %  Min: 92 %  Max: 99 %  24HR INTAKE/OUTPUT:      Intake/Output Summary (Last 24 hours) at 2021 1745  Last data filed at 2021 1721  Gross per 24 hour   Intake 1804.16 ml   Output 1375 ml   Net 429.16 ml       Exam:  General appearance: Intubated and sedated on vent. Fio2 60%  HEENT Normal cephalic, atraumatic without obvious deformity. Neck: Supple,Trachea midline without thyromegaly or adenopathy with full range of motion. Lungs: bilateral  Good air entry, no wheezing or crackles  Heart: Regular rhythm ,tachycardic. Abdomen: Soft, non-tender or non-distended without rigidity or guarding and positive bowel sounds all four quadrants. Extremities: No clubbing, cyanosis, or edema bilaterally. Skin: Skin color, texture, turgor normal. No rashes or lesions. Neurologic: Sedated.       Data    Recent Labs     12/15/21  1118 21  0500 21  1238   WBC 14.3* 23.6* 21.9*   HGB 14.5 14.7 14.6   HCT 41.9 43.9 43.0    122* 183      Recent Labs     12/15/21  1118 21  0500    136   K 3.7 4.4    96*   CO2 24 25   BUN 20 27*   CREATININE 0.7 1.0     No results for input(s): AST, ALT, ALB, BILIDIR, BILITOT, ALKPHOS in the last 72 hours. No results for input(s): INR in the last 72 hours. No results for input(s): CKTOTAL, CKMB, CKMBINDEX, TROPONINI in the last 72 hours. Imaging/Test Results    Consults:     IP CONSULT TO HOSPITALIST  IP CONSULT TO PULMONOLOGY  IP CONSULT TO CARDIOLOGY  IP CONSULT TO INFECTIOUS DISEASES  PHARMACY TO DOSE VANCOMYCIN  IP CONSULT TO DIETITIAN  IP CONSULT TO PHARMACY    Allergies  Patient has no known allergies.     Medications      Scheduled Meds:   enoxaparin  1 mg/kg SubCUTAneous BID    cefepime  2,000 mg IntraVENous Q12H    vancomycin  1,000 mg IntraVENous Q12H    chlorhexidine  15 mL Mouth/Throat BID    famotidine (PEPCID) injection  20 mg IntraVENous BID    oxymetazoline  2 spray Each Nostril Once    lidocaine   Topical Once    ipratropium  4 puff Inhalation Q4H    And    albuterol sulfate HFA  4 puff Inhalation Q4H    sodium chloride flush  5-40 mL IntraVENous 2 times per day    baricitinib  4 mg Oral Daily    midodrine  10 mg Oral TID WC    dexamethasone  10 mg IntraVENous Q24H    vitamin D  2,000 Units Oral Daily    busPIRone  5 mg Oral BID    calcium carbonate  500 mg Oral Daily    levothyroxine  125 mcg Oral Daily    montelukast  10 mg Oral Nightly       Infusions:   fentaNYL 75 mcg/hr (12/17/21 0504)    propofol 70 mcg/kg/min (12/17/21 1446)    furosemide (LASIX) 1mg/ml infusion 5 mg/hr (12/17/21 1104)    sodium chloride Stopped (12/17/21 0108)    vecuronium (NORCURON) infusion 0.8 mcg/kg/min (12/17/21 0453)    norepinephrine-sodium chloride Stopped (12/16/21 1929)       PRN Meds:  sodium chloride flush, sodium chloride, sodium chloride, benzonatate, ondansetron **OR** ondansetron, polyethylene glycol, acetaminophen **OR** acetaminophen, guaiFENesin-dextromethorphan, hydrOXYzine, traZODone    ASSESSMENT AND PLAN      Active Hospital Problems    Diagnosis Date Noted    Acute respiratory failure with hypoxia ([facility] [address] [J96.01] 12/09/2021    Pneumonia due to COVID-19 virus [U07.1, J12.82] 12/07/2021       Assessment /Plan    Acute hypoxic respiratory failure  Intubated 12/16 for worsening resp failure. On Vent 60%FIO2. Pulmonary following. Continue nebs and steroid  Repeat labs in AM    Severe sepsis secondary to COVID-19 pneumonia  CT chest did not show any PE, only patchy opacities bilaterally. Procal up from 0.09 to 0.6  CRP up from 11 to 62  -On iv cefepime and vanco  -On levophed,fentany,propofol and vencuronium  Received Tocilizumab 12/09 and on Decadron. ID following. Quantiferon indeterminate  Pulmonology following. Vanc/Cefepime started by cardio 12/16. Mood disorder:   On buspirone     Hypothyroidism:   On levothyroxine 125 mcg daily     PAF ? / Hypotension  Pt was on Cardizem 120 mg twice a day seems like an odd dose. Cardiology consulted. Saw pt and does not think she has atrial fibrillation Cardizem was held then started on IV fluids and echo done. Echo good. Hypotension resolved. Cardiology following. Pt on lasix drip per cardiology recs. H/o HTN--was on diltiazem, stopped d/t hypotension. Started on midodrine by cardio and dose increased 12/15 secondary to hypotension. DVT Prophylaxis: SQ Lovenox full dose.     Diet: Diet NPO     Code Status: Full Code      Beba Og MD  Hospitalist

## 2021-12-17 NOTE — PROGRESS NOTES
Pulmonary and Critical Care  Progress Note      VITALS:  /85   Pulse 102   Temp 99.7 °F (37.6 °C) (Rectal)   Resp 22   Ht 5' 5\" (1.651 m)   Wt 153 lb 10.6 oz (69.7 kg)   SpO2 97%   BMI 25.57 kg/m²     Subjective:   CHIEF COMPLAINT :SOB     HPI:                The patient is on the vent and sedated. She has been intubated for the worsening resp failure. Objective:   PHYSICAL EXAM:    LUNGS:Occasional basal crackles  Abd-soft, BS+,NT  Ext- no pedal edema  CVS-s1s2, no murmurs      DATA:    CBC:  Recent Labs     12/14/21  1356 12/15/21  1118 12/17/21  0500   WBC 13.4* 14.3* 23.6*   RBC 4.68 4.58 4.71   HGB 14.5 14.5 14.7   HCT 42.2 41.9 43.9    192 122*   MCV 90.2 91.5 93.2   MCH 31.0 31.7* 31.2*   MCHC 34.4 34.6 33.5   RDW 12.0 12.2 12.2   SEGSPCT 92.9* 89.0* 96.1*   BANDSPCT  --  2*  --       BMP:  Recent Labs     12/14/21  1356 12/15/21  1118 12/17/21  0500    136 136   K 3.5 3.7 4.4   CL 99 100 96*   CO2 23 24 25   BUN 15 20 27*   CREATININE 0.7 0.7 1.0   CALCIUM 8.3 8.4 8.3   GLUCOSE 152* 156* 142*      ABG:  Recent Labs     12/15/21  1000 12/16/21  1030 12/17/21  0600   PH 7.45 7.17* 7.42   PO2ART 85 90 103*   PCR2ZJW 36.0 69.0* 36.0   O2SAT 94.9* 94.0* 96.0     BNP  No results found for: BNP   D-Dimer:  Lab Results   Component Value Date    DDIMER 544 (H) 12/10/2021      Radiology:   1. Right greater than left bilateral airspace opacities without significant   change. 2. Right-sided PICC line with the tip overlying the distal SVC/right atrial   region. 3. Tip of the ET tube is 5.3 cm above the ellie.         1. Assessment/Plan     Patient Active Problem List    Diagnosis Date Noted    Acute respiratory failure with hypoxia ([facility] [address] 12/09/2021    Pneumonia due to COVID-19 virus 12/07/2021     Overview Note:     Tested positive on 12/4/2021      Anxiety 12/28/2020     Overview Note:     Patient has anxiety.   She was taking Vistaril as needed but states he still feels anxious. BuSpar 5 mg twice a day      Adjustment insomnia 09/23/2020     Overview Note:     Pt has insomnia because of anxiety and stress  Trial of trazodone 50 mg daily . Side effects explained.  Abnormal mammogram 09/24/2019     Overview Note:     9/24/19 : abnormal : repeat in 6 months. Pt was informed by radiologist   Mammogram in 3/20 was normal.  Patient is followed by gynecologist  1/7/21 : Kip Skeans normal at [address] Tachycardia 06/26/2018    Essential hypertension 06/26/2018     Overview Note:     Patient has hypertension. Pt is on diltiazem  mg bid       Adjustment disorder with anxious mood 03/23/2018     Overview Note:     Pt wants to try vistaril prn.  Thyroid ca ([address] 12/15/2015     Overview Note:     Had total thyroidectomy by Dr Merlinda Jaeger. On synthroid. Managed by Dr Jasvir Hurd.  -he increased synthroid to 125 mcg daily except  1 1/2 tablet on Sundays  Pt has f/u appointment in 11/2021      Cough variant asthma      Overview Note:     On, albuterol and singulair. Pt is on Symbicort: feels well. Acute Hypoxic resp failure sec to Cardiogenic and non cardiogenic Pulmonary edema  Bilateral Pneumonia sec to COVID-19  Sec. Hypothryroidism  Anxiety  Afib rate controlled  VDRF       1. Decadron  2. Insulin  3. Inhalers  4. Abx  5. F/u C&S  6. Keep sats > 88%  7. RASS of -1 to -2  8. Prone ventilation as tolerated  9. CXR in am  10. CPT  11.  C.w present management    Electronically signed by Sweta Arteaga MD on 12/17/2021 at 10:22 AM

## 2021-12-17 NOTE — PROGRESS NOTES
12/17/21 1151   Vent Information   Vent Type 980   Vent Mode AC/PC   Pressure Ordered 14   Rate Set 22 bmp   FiO2  65 %   SpO2 97 %   SpO2/FiO2 ratio 149.23   Sensitivity 3   PEEP/CPAP 12   Humidification Source HME   Vent Patient Data   Peak Inspiratory Pressure 27 cmH2O   Mean Airway Pressure 18 cmH20   Rate Measured 22 br/min   Vt Exhaled 634 mL   Minute Volume 13.9 Liters   I:E Ratio 1:1.4   Total PEEP 12 cmH20   Breath Sounds   Right Upper Lobe Clear   Right Middle Lobe Diminished   Right Lower Lobe Clear   Left Upper Lobe Clear   Left Lower Lobe Diminished   Alarm Settings   High Pressure Alarm 40 cmH2O   Delay Alarm 20 sec(s)   Low Minute Volume Alarm 2.5 L/min   Apnea (secs) 20 secs   High Respiratory Rate 40 br/min   Low Exhaled Vt  250 mL   ETT (adult)   Placement Date/Time: 12/16/21 (c) 1446   Preoxygenation: Yes  Mask Ventilation: Mask ventilation not attempted (0)  Technique: Video laryngoscopy  Tube Size: 7.5 mm  Laryngoscope: (c) Mac  Blade Size: 3  Location: Oral  Insertion attempts: 1  Placemen. ..    Secured at 23 cm   Measured From Lips   ET Placement Right   Secured By Commercial tube wren   Site Condition Dry

## 2021-12-17 NOTE — PROGRESS NOTES
12/17/21 0804   Vent Information   $Ventilation $Subsequent Day   Vent Type 980   Vent Mode AC/PC   Pressure Ordered 18   Rate Set 22 bmp   FiO2  60 %   SpO2 97 %   SpO2/FiO2 ratio 161.67   Sensitivity 3   PEEP/CPAP 12   Humidification Source HME   Nitric Oxide/Epoprostenol In Use? No   Vent Patient Data   Peak Inspiratory Pressure 31 cmH2O   Mean Airway Pressure 20 cmH20   Rate Measured 22 br/min   Vt Exhaled 721 mL   Minute Volume 15.8 Liters   I:E Ratio 1:1.4   Plateau Pressure 28 [id number]   Static Compliance 45 mL/cmH2O   Total PEEP 13 cmH20   Auto PEEP 0.8 cmH20   Breath Sounds   Right Upper Lobe Clear   Right Middle Lobe Clear   Right Lower Lobe Clear   Left Upper Lobe Clear   Left Lower Lobe Clear   Additional Respiratory  Assessments   Oral Care Mouth suctioned   Alarm Settings   High Pressure Alarm 40 cmH2O   Delay Alarm 20 sec(s)   Low Minute Volume Alarm 2.5 L/min   Apnea (secs) 20 secs   High Respiratory Rate 40 br/min   Low Exhaled Vt  250 mL   ETT (adult)   Placement Date/Time: 12/16/21 (c) 0460   Preoxygenation: Yes  Mask Ventilation: Mask ventilation not attempted (0)  Technique: Video laryngoscopy  Tube Size: 7.5 mm  Laryngoscope: (c) Mac  Blade Size: 3  Location: Oral  Insertion attempts: 1  Placemen. ..    Secured at 23 cm   Measured From Lips   ET Placement Right   Secured By Commercial tube wren   Site Condition Dry

## 2021-12-17 NOTE — PROGRESS NOTES
Pharmacy Consult for Baricitinib Initiation per Dr. Karmen Byrd    Day 1 of 14    Criteria per [facility] P&T Committee  **All criteria need to be met to receive   Baricitinib for COVID-19 patients**   Age    >5years old     Yes   Laboratory Results    Confirmed positive COVID-19     PLUS    ANY elevation in biomarkers   (CRP, D-dimer, LDH, ferritin)       Yes     Concomitant Therapy    Receiving systemic steroids for treatment of COVID 19     Yes   Oxygen Status        Requiring supplemental oxygen   (>1 L NC, HFNC, Heated Vapotherm, biPAP, mechanical ventilation)        Yes   Special Considerations    Pregnancy  Severe Hepatic Impairment  Chronic/Recurrent Infections   Hemoglobin <8         Clarify risk vs. benefit with provider if criteria met     Contraindications    1. Invasive active mycobacterial or fungal infection  2. Lymphocyte count <200  3. Neutrophil count, ANC <500   4. Significant immunosuppression    ? None     D-DIMER:  No results for input(s): DDIMER in the last 72 hours. FERRITIN:  Ferritin   Date Value Ref Range Status   12/09/2021 456 (H) 15 - 150 NG/ML Final     C-REACTIVE PROTEIN:  No results found for: CRP    LIVER FUNCTION LAB EVALUATION  No results for input(s): AST, ALT, ALKPHOS, BILITOT, INR in the last 72 hours. RENAL LAB EVALUATION  Estimated Creatinine Clearance: 92 mL/min (based on SCr of 0.7 mg/dL).   Recent Labs     12/14/21  1356 12/15/21  1118   BUN 15 20   CREATININE 0.7 0.7     SEGS ABSOLUTE (ANC)    Recent Labs     12/14/21  1356 12/15/21  1118   SEGSABS 12.4 12.8     -----------------------------------------  Dosing Recommendations:    Give :Baricitinib 4 mg PO daily x 14 days (or until hospital discharge)    Renal dose adjustment:  -eGFR > 60: no adjustment necessary     -Thank you for the consult,  Nomi Nichole, [facility]

## 2021-12-17 NOTE — PROGRESS NOTES
12/17/21 1514   Vent Information   Equipment Changed HME   Vent Type 980   Vent Mode AC/PC   Pressure Ordered 14   Rate Set 22 bmp   FiO2  60 %   SpO2 96 %   SpO2/FiO2 ratio 160   Sensitivity 3   PEEP/CPAP 12   Humidification Source HME   Nitric Oxide/Epoprostenol In Use? No   Vent Patient Data   Peak Inspiratory Pressure 27 cmH2O   Mean Airway Pressure 18 cmH20   Rate Measured 22 br/min   Vt Exhaled 628 mL   Minute Volume 13.8 Liters   I:E Ratio 1:1.4   Plateau Pressure 25 [id number]   Static Compliance 48 mL/cmH2O   Total PEEP 13 cmH20   Auto PEEP 0.8 cmH20   Breath Sounds   Right Upper Lobe Clear   Right Middle Lobe Clear   Right Lower Lobe Diminished   Left Upper Lobe Clear   Left Lower Lobe Diminished   Alarm Settings   High Pressure Alarm 40 cmH2O   Delay Alarm 20 sec(s)   Low Minute Volume Alarm 2.5 L/min   Apnea (secs) 20 secs   High Respiratory Rate 40 br/min   Low Exhaled Vt  250 mL   ETT (adult)   Placement Date/Time: 12/16/21 (c) 1446   Preoxygenation: Yes  Mask Ventilation: Mask ventilation not attempted (0)  Technique: Video laryngoscopy  Tube Size: 7.5 mm  Laryngoscope: (c) Mac  Blade Size: 3  Location: Oral  Insertion attempts: 1  Placemen. ..    Secured at 23 cm   Measured From Lips   ET Placement Right   Secured By Commercial tube wren   Site Condition Dry

## 2021-12-17 NOTE — PROGRESS NOTES
12/17/21 0457   Vent Information   Vent Type 980   Vent Mode AC/PC   Pressure Ordered 18   Rate Set 22 bmp   FiO2  80 %   SpO2 98 %   SpO2/FiO2 ratio 122.5   Sensitivity 3   PEEP/CPAP 15   Humidification Source HME   Vent Patient Data   Peak Inspiratory Pressure 34 cmH2O   Mean Airway Pressure 23 cmH20   Rate Measured 22 br/min   Vt Exhaled 654 mL   Minute Volume 14.4 Liters   I:E Ratio 1:1.4   Plateau Pressure 33 IOR59   Static Compliance 38 mL/cmH2O   Total PEEP 16 cmH20   Spontaneous Breathing Trial (SBT) RT Doc   Pulse 111   Alarm Settings   High Pressure Alarm 40 cmH2O   Delay Alarm 20 sec(s)   Low Minute Volume Alarm 2.5 L/min   Apnea (secs) 20 secs   High Respiratory Rate 40 br/min   Low Exhaled Vt  250 mL   ETT (adult)   Placement Date/Time: 12/16/21 (c) 1445   Preoxygenation: Yes  Mask Ventilation: Mask ventilation not attempted (0)  Technique: Video laryngoscopy  Tube Size: 7.5 mm  Laryngoscope: (c) Mac  Blade Size: 3  Location: Oral  Insertion attempts: 1  Placemen. ..    Secured at 23 cm   Measured From Lips   ET Placement Right   Secured By Commercial tube wren   Site Condition Dry

## 2021-12-18 NOTE — PROGRESS NOTES
Updated Dr. Maria A Rush, patient's heart rate in the 150's, pressure in 180's and SATs 84% Telephone order to continue Vec .

## 2021-12-18 NOTE — PROGRESS NOTES
Comprehensive Nutrition Assessment    Type and Reason for Visit:  Reassess    Nutrition Recommendations/Plan:   · EN Order: peptide based, high protein at 45 mL/hr    Nutrition Assessment:  Pt is now vented and RD has been consulted for EN order and manage. Pt is hemodynamically stable. Will order EN    Malnutrition Assessment:  Malnutrition Status: At risk for malnutrition (Comment)    Context:  Acute Illness       Estimated Daily Nutrient Needs:  Energy (kcal):  1855; Weight Used for Energy Requirements:  Current     Protein (g):  85; Weight Used for Protein Requirements:  Ideal        Fluid (ml/day):  2000; Method Used for Fluid Requirements:  1 ml/kcal      Nutrition Related Findings:  Covid vent      Wounds:  None       Current Nutrition Therapies:    Diet NPO    Anthropometric Measures:  · Height: 5' 5\" (165.1 cm)  · Current Body Weight: 153 lb (69.4 kg)   · Admission Body Weight: 153 lb (69.4 kg)    · Usual Body Weight: 162 lb (73.5 kg)     · Ideal Body Weight: 125 lbs; % Ideal Body Weight 122.4 %   · BMI: 25.5  · BMI Categories: Overweight (BMI 25.0-29. 9)       Nutrition Diagnosis:   · Inadequate oral intake related to altered taste perception, impaired respiratory function as evidenced by NPO or clear liquid status due to medical condition, intubation      Nutrition Interventions:   Food and/or Nutrient Delivery:  Start Tube Feeding  Nutrition Education/Counseling:  No recommendation at this time   Coordination of Nutrition Care:  Continue to monitor while inpatient    Goals:  pt will tolerate the start of EN       Nutrition Monitoring and Evaluation:   Behavioral-Environmental Outcomes:  None Identified   Food/Nutrient Intake Outcomes:  Enteral Nutrition Intake/Tolerance  Physical Signs/Symptoms Outcomes:  Biochemical Data, Meal Time Behavior, Skin, Weight     Discharge Planning:     Too soon to determine     Electronically signed by Maye Florez RD, LD on 53/41/55 at 9:23 PM EST    Contact: [phone]

## 2021-12-18 NOTE — PROGRESS NOTES
Patient heart rate increased to mid 130's to mid 140's. Blood pressure in 160's to 170's. Norah served Dr Anuradha Ferrer for update. Awaiting response.

## 2021-12-18 NOTE — PROGRESS NOTES
Pulmonary and Critical Care  Progress Note      VITALS:  BP (!) 162/94   Pulse 131   Temp 99.9 °F (37.7 °C) (Rectal)   Resp 22   Ht 5' 5\" (1.651 m)   Wt 148 lb 9.4 oz (67.4 kg)   SpO2 90%   BMI 24.73 kg/m²     Subjective:   CHIEF COMPLAINT :SOB     HPI:                The patient is on the vent and sedated. Her Fio2 is coming down. She is not in acute resp distress    Objective:   PHYSICAL EXAM:    LUNGS:Occasional basal crackles L > R  Abd-soft, BS+,NT  Ext- no pedal edema  CVS-s1s2, no murmurs      DATA:    CBC:  Recent Labs     12/17/21  0500 12/17/21  1238 12/18/21  0500   WBC 23.6* 21.9* 20.5*   RBC 4.71 4.66 4.33   HGB 14.7 14.6 13.6   HCT 43.9 43.0 40.6   * 183 132*   MCV 93.2 92.3 93.8   MCH 31.2* 31.3* 31.4*   MCHC 33.5 34.0 33.5   RDW 12.2 12.3 12.4   SEGSPCT 96.1* 83.0* 94.4*   BANDSPCT  --  15*  --       BMP:  Recent Labs     12/17/21  0500 12/18/21  0500    135   K 4.4 4.0   CL 96* 93*   CO2 25 26   BUN 27* 33*   CREATININE 1.0 1.1   CALCIUM 8.3 7.8*   GLUCOSE 142* 173*      ABG:  Recent Labs     12/16/21  1030 12/17/21  0600 12/18/21  0600   PH 7.17* 7.42 7.37   PO2ART 90 103* 84   TNO4YBM 69.0* 36.0 49.0*   O2SAT 94.0* 96.0 94.4*     BNP  No results found for: BNP   D-Dimer:  Lab Results   Component Value Date    DDIMER 544 (H) 12/10/2021      Radiology: Moderate diffuse bilateral lung airspace disease which has worsened on the   left but improved on the right.       Lines and tubes are stable     1. Assessment/Plan     Patient Active Problem List    Diagnosis Date Noted    Acute respiratory failure with hypoxia ([facility] [address] 12/09/2021    Pneumonia due to COVID-19 virus 12/07/2021     Overview Note:     Tested positive on 12/4/2021      Anxiety 12/28/2020     Overview Note:     Patient has anxiety. She was taking Vistaril as needed but states he still feels anxious.   BuSpar 5 mg twice a day      Adjustment insomnia 09/23/2020     Overview Note:     Pt has insomnia because of anxiety and stress  Trial of trazodone 50 mg daily . Side effects explained.  Abnormal mammogram 09/24/2019     Overview Note:     9/24/19 : abnormal : repeat in 6 months. Pt was informed by radiologist   Mammogram in 3/20 was normal.  Patient is followed by gynecologist  1/7/21 : Karmen Manvel normal at [address] Tachycardia 06/26/2018    Essential hypertension 06/26/2018     Overview Note:     Patient has hypertension. Pt is on diltiazem  mg bid       Adjustment disorder with anxious mood 03/23/2018     Overview Note:     Pt wants to try vistaril prn.  Thyroid ca ([address] 12/15/2015     Overview Note:     Had total thyroidectomy by Dr Sanford Spears. On synthroid. Managed by Dr Mary Cervantes.  -he increased synthroid to 125 mcg daily except  1 1/2 tablet on Sundays  Pt has f/u appointment in 11/2021      Cough variant asthma      Overview Note:     On, albuterol and singulair. Pt is on Symbicort: feels well. Acute Hypoxic resp failure sec to Cardiogenic and non cardiogenic Pulmonary edema  Bilateral Pneumonia sec to COVID-19  Sec. Hypothryroidism  Anxiety  Afib rate controlled  VDRF       1. Abx  2. F/u C&s  3. Diuresis per Cardiology  4. Keep sats > 88%  5. Tube feeds  6. CPT  7. CXR in am  8. SAT and SBT trial as tolerated  9.  C.w present management    Electronically signed by Gisselle Sullivan MD on 12/18/2021 at 12:25 PM

## 2021-12-18 NOTE — PROGRESS NOTES
0. 3   ALKPHOS 98     No results for input(s): INR in the last 72 hours. No results for input(s): CKTOTAL, CKMB, CKMBINDEX, TROPONINI in the last 72 hours. Imaging/Test Results    Consults:     IP CONSULT TO HOSPITALIST  IP CONSULT TO PULMONOLOGY  IP CONSULT TO CARDIOLOGY  IP CONSULT TO INFECTIOUS DISEASES  PHARMACY TO DOSE VANCOMYCIN  IP CONSULT TO DIETITIAN  IP CONSULT TO PHARMACY    Allergies  Patient has no known allergies.     Medications      Scheduled Meds:   baricitinib  2 mg Oral Daily    dexamethasone  10 mg IntraVENous Q24H    dilTIAZem  30 mg Oral 3 times per day    enoxaparin  1 mg/kg SubCUTAneous BID    cefepime  2,000 mg IntraVENous Q12H    vancomycin  1,000 mg IntraVENous Q12H    chlorhexidine  15 mL Mouth/Throat BID    famotidine (PEPCID) injection  20 mg IntraVENous BID    oxymetazoline  2 spray Each Nostril Once    lidocaine   Topical Once    ipratropium  4 puff Inhalation Q4H    And    albuterol sulfate HFA  4 puff Inhalation Q4H    sodium chloride flush  5-40 mL IntraVENous 2 times per day    [Held by provider] midodrine  10 mg Oral TID WC    vitamin D  2,000 Units Oral Daily    busPIRone  5 mg Oral BID    calcium carbonate  500 mg Oral Daily    levothyroxine  125 mcg Oral Daily    montelukast  10 mg Oral Nightly       Infusions:   sodium chloride      fentaNYL 75 mcg/hr (12/18/21 1418)    propofol 70 mcg/kg/min (12/18/21 1450)    sodium chloride 25 mL (12/18/21 0000)    vecuronium (NORCURON) infusion 1.1 mcg/kg/min (12/18/21 1708)    norepinephrine-sodium chloride Stopped (12/16/21 1929)       PRN Meds:  metoprolol, sodium chloride flush, sodium chloride, sodium chloride, ondansetron **OR** ondansetron, polyethylene glycol, acetaminophen **OR** acetaminophen, guaiFENesin-dextromethorphan, traZODone    ASSESSMENT AND PLAN      Active Hospital Problems    Diagnosis Date Noted    Acute respiratory failure with hypoxia ([facility] [address] [J96.01] 12/09/2021    Pneumonia due to COVID-19 virus [U07.1, J12.82] 12/07/2021       Assessment /Plan    Acute hypoxic respiratory failure  Intubated 12/16 for worsening resp failure. On Vent 40%FIO2. Pulmonary following. Continue nebs ,abx and steroid  Wean off vencuronium. Continue propofol and fentanyl. Repeat labs in AM    Severe sepsis secondary to COVID-19 pneumonia  CT chest did not show any PE, only patchy opacities bilaterally. Procal up from 0.09 to 0.6  CRP  62. Not done today  -On iv cefepime and vanco  -Not on pressors  Received Tocilizumab 12/09 and on Decadron/barcitinib. ID following. Quantiferon indeterminate  Pulmonology following. Vanc/Cefepime started 12/16. Follow cultures. Mood disorder:   On buspirone     Hypothyroidism:   On levothyroxine 125 mcg daily     PAF ? / Hypotension  Pt was on Cardizem 120 mg twice a day seems like an odd dose. Cardiology consulted. Saw pt and does not think she has atrial fibrillation Cardizem was held then started on IV fluids and echo done. Echo good. Hypotension resolved. Cardiology following. Pt on lasix drip per cardiology recs. H/o HTN--was on diltiazem, stopped d/t hypotension. Started on midodrine by cardio and dose increased 12/15 secondary to hypotension. Midodrine held 12/18 sec to elevated BP/tachy. Lopressor prn and PO Cardizem scheduled started. Gentle hydration. DVT Prophylaxis: SQ Lovenox full dose.     Diet: Diet NPO  ADULT TUBE FEEDING; Nasogastric; Peptide Based High Protein; Continuous; 25; Yes; 25; Q 4 hours; 45; 30; Q 4 hours     Code Status: Full Code      Rommel Alvarenga MD  Hospitalist

## 2021-12-18 NOTE — PROGRESS NOTES
12/18/21 0815   Vent Information   $Ventilation $Subsequent Day   Equipment Changed HME   Vent Type 980   Vent Mode AC/PC   Vt Ordered 500 mL   Pressure Ordered 14   Rate Set 22 bmp   FiO2  45 %   SpO2 95 %   SpO2/FiO2 ratio 211.11   Sensitivity 3   PEEP/CPAP 12   I Time/ I Time % 1.14 s   Humidification Source HME   Nitric Oxide/Epoprostenol In Use? No   Vent Patient Data   High Peep/I Pressure 14   Peak Inspiratory Pressure 22 cmH2O   Mean Airway Pressure 18 cmH20   Rate Measured 22 br/min   Vt Exhaled 546 mL   Minute Volume 12.2 Liters   I:E Ratio 1:1.4   Plateau Pressure 22 RLH69   Static Compliance 41 mL/cmH2O   Total PEEP 13 cmH20   Auto PEEP 1.5 cmH20   Additional Respiratory  Assessments   Resp 22   Position Semi-Holm's   Subglottic Suction Done?  Yes   Alarm Settings   High Pressure Alarm 40 cmH2O   Delay Alarm 20 sec(s)   Low Minute Volume Alarm 2.5 L/min   Apnea (secs) 20 secs   Low Exhaled Vt  250 mL   Non-Surgical Airway 12/16/21 Endo Tracheal Tube   Placement Date/Time: 12/16/21 4284   Placed By: Licensed provider  Inserted by: Ermias MOULTON   Insertion attempts: 1  Airway Device: Endo Tracheal Tube  Size: 7.5   Secured at 23 cm   Measured From Lips   Secured Location Right   Secured By Commercial tube wren   Site Condition Dry

## 2021-12-18 NOTE — PLAN OF CARE

## 2021-12-18 NOTE — PROGRESS NOTES
Updated Dr. Sheba Orourke of patient's heart rate ranging from mid 120's-high 130's. Verbal order to hold midodrine for now.

## 2021-12-19 NOTE — PROGRESS NOTES
[address] [facility]  consulted by Dr. Adrianne Roper for monitoring and adjustment. Indication for treatment: Pneumonia (HAP)  Goal trough: 15-20 mcg/mL  AUC Goal:  400-600    Pertinent Laboratory Values:   Temp Readings from Last 3 Encounters:   12/19/21 100.4 °F (38 °C) (Rectal)   12/08/21 98 °F (36.7 °C) (Infrared)   09/23/21 98.8 °F (37.1 °C) (Oral)     Recent Labs     12/17/21  1238 12/18/21  0500 12/19/21  0300   WBC 21.9* 20.5* 20.6*     Recent Labs     12/17/21  0500 12/18/21  0500 12/19/21  0300   BUN 27* 33*  --    CREATININE 1.0 1.1 0.6     Estimated Creatinine Clearance: 99 mL/min (based on SCr of 0.6 mg/dL). Intake/Output Summary (Last 24 hours) at 12/19/2021 1213  Last data filed at 12/19/2021 0453  Gross per 24 hour   Intake 661.04 ml   Output 1700 ml   Net -1038.96 ml     Pertinent Cultures:  Date    Source    Results  12/8   Covid-19   Positive  12/15   Resp    Ordered  12/15                          Blood                                       Ordered    Vancomycin level:   TROUGH:    Recent Labs     12/19/21  0300   VANCOTROUGH 15.2     RANDOM:  No results for input(s): VANCORANDOM in the last 72 hours.     Assessment:  · WBC and temperature: WBC elevated/febrile  · SCr, BUN, and urine output: Renal function improving  · Day(s) of therapy: 3  · Vancomycin concentration: 15.2 (~5 hours)     Plan:  · Increase to vancomycin 1250mg q12h   · Pharmacy will continue to monitor patient and adjust therapy as indicated    Panfilo 3 12/22 @0600    Thank you for the consult,  Gosia Bliss, [facility]  12/19/2021 12:13 PM

## 2021-12-19 NOTE — PROGRESS NOTES
Advanced ETT to [email or web address] per xray. Equal breath sounds noted. New xray ordered by RN.

## 2021-12-19 NOTE — PROGRESS NOTES
Hospitalist Progress Note      PCP: Ambar Fajardo MD    Date of Admission: 2021    Chief Complaint on Admission: SOB    Pt Seen/Examined and Chart Reviewed. Admitting dx covid pneumonia    SUBJECTIVE:   Seen at bedside this morning. RN present and case D/W her. States they are still trying to wean her off vecuronium. Intubated and on vent. Sedated. Tmax 100.8. Overnight has issues with increased HR and low BP with giving Cardizem. This AM,BP was high and HR also high. Was given Cardizem po but not helping. Noted her feet are cold but pulses palpable. Will continue to monitor. OBJECTIVE:   Vitals    TEMPERATURE:  Current - Temp: 100.6 °F (38.1 °C); Max - Temp  Av.2 °F (37.9 °C)  Min: 99 °F (37.2 °C)  Max: 100.8 °F (38.2 °C)  RESPIRATIONS RANGE: Resp  Av.1  Min: 22  Max: 25  PULSE RANGE: Pulse  Av.5  Min: 85  Max: 139  BLOOD PRESSURE RANGE:  Systolic (95HWA), PUB:670 , Min:94 , UGQ:163    Diastolic (98BCC), RNN:62, Min:51, Max:123    PULSE OXIMETRY RANGE: SpO2  Av.1 %  Min: 89 %  Max: 98 %  24HR INTAKE/OUTPUT:      Intake/Output Summary (Last 24 hours) at 2021 1407  Last data filed at 2021 0453  Gross per 24 hour   Intake 661.04 ml   Output 1700 ml   Net -1038.96 ml       Exam:  General appearance: Intubated and sedated on vent. HEENT Normal cephalic, atraumatic without obvious deformity. Neck: Supple,Trachea midline without thyromegaly or adenopathy with full range of motion. Lungs: bilateral  Good air entry, no wheezing or crackles  Heart: Regular rhythm ,tachycardic. Abdomen: Soft, non-tender or non-distended without rigidity or guarding and positive bowel sounds all four quadrants. Tirado with good UOP  Extremities: No clubbing, cyanosis, or edema bilaterally. Skin: Skin color, texture, turgor normal. No rashes or lesions. Neurologic: Sedated.       Data    Recent Labs     21  1238 21  0500 21  0300   WBC 21.9* 20.5* 20.6*   HGB 14.6 13.6 11.7* HCT 43.0 40.6 33.4*    132* 132*      Recent Labs     12/17/21  0500 12/18/21  0500 12/19/21  0300    135  --    K 4.4 4.0  --    CL 96* 93*  --    CO2 25 26  --    BUN 27* 33*  --    CREATININE 1.0 1.1 0.6     Recent Labs     12/18/21  0500 12/19/21  0300   AST 36 36   ALT 47* 40   BILIDIR 0.2 0.2   BILITOT 0.3 0.2   ALKPHOS 98 80     No results for input(s): INR in the last 72 hours. No results for input(s): CKTOTAL, CKMB, CKMBINDEX, TROPONINI in the last 72 hours. Imaging/Test Results    Consults:     IP CONSULT TO HOSPITALIST  IP CONSULT TO PULMONOLOGY  IP CONSULT TO CARDIOLOGY  IP CONSULT TO INFECTIOUS DISEASES  PHARMACY TO DOSE VANCOMYCIN  IP CONSULT TO DIETITIAN  IP CONSULT TO PHARMACY    Allergies  Patient has no known allergies.     Medications      Scheduled Meds:   baricitinib  4 mg Oral Daily    dexamethasone  10 mg IntraVENous Q24H    vancomycin  1,250 mg IntraVENous Q12H    enoxaparin  1 mg/kg SubCUTAneous BID    cefepime  2,000 mg IntraVENous Q12H    chlorhexidine  15 mL Mouth/Throat BID    famotidine (PEPCID) injection  20 mg IntraVENous BID    oxymetazoline  2 spray Each Nostril Once    lidocaine   Topical Once    ipratropium  4 puff Inhalation Q4H    And    albuterol sulfate HFA  4 puff Inhalation Q4H    sodium chloride flush  5-40 mL IntraVENous 2 times per day    [Held by provider] midodrine  10 mg Oral TID     vitamin D  2,000 Units Oral Daily    busPIRone  5 mg Oral BID    calcium carbonate  500 mg Oral Daily    levothyroxine  125 mcg Oral Daily    montelukast  10 mg Oral Nightly       Infusions:   midazolam 1 mg/hr (12/19/21 1341)    dilTIAZem (CARDIZEM) 125 mg in dextrose 5% 125 mL infusion      sodium chloride 50 mL/hr at 12/18/21 1841    fentaNYL 125 mcg/hr (12/19/21 1109)    propofol 70 mcg/kg/min (12/19/21 1211)    sodium chloride 25 mL (12/19/21 0854)    vecuronium (NORCURON) infusion 0.8 mcg/kg/min (12/19/21 1110)    norepinephrine-sodium Protein tube feed.     Code Status: Full Code      Adela Wood MD  Hospitalist

## 2021-12-19 NOTE — PROGRESS NOTES
Pulmonary and Critical Care  Progress Note      VITALS:  BP (!) 183/92   Pulse 121   Temp 100.6 °F (38.1 °C) (Rectal)   Resp 23   Ht 5' 5\" (1.651 m)   Wt 148 lb 9.4 oz (67.4 kg)   SpO2 91%   BMI 24.73 kg/m²     Subjective:   CHIEF COMPLAINT :SOB     HPI:                The patient is on the vent and sedated. She failed the vent wean in the morning. Objective:   PHYSICAL EXAM:    LUNGS:Occasional basal crackles   Abd-soft, BS+,NT  Ext- no pedal edema  CVS-s1s2, no murmurs      DATA:    CBC:  Recent Labs     12/17/21  1238 12/18/21  0500 12/19/21  0300   WBC 21.9* 20.5* 20.6*   RBC 4.66 4.33 3.58*   HGB 14.6 13.6 11.7*   HCT 43.0 40.6 33.4*    132* 132*   MCV 92.3 93.8 93.3   MCH 31.3* 31.4* 32.7*   MCHC 34.0 33.5 35.0   RDW 12.3 12.4 12.5   SEGSPCT 83.0* 94.4* 90.8*   BANDSPCT 15*  --   --       BMP:  Recent Labs     12/17/21  0500 12/18/21  0500 12/19/21  0300    135  --    K 4.4 4.0  --    CL 96* 93*  --    CO2 25 26  --    BUN 27* 33*  --    CREATININE 1.0 1.1 0.6   CALCIUM 8.3 7.8*  --    GLUCOSE 142* 173*  --       ABG:  Recent Labs     12/17/21  0600 12/18/21  0600 12/19/21  0600   PH 7.42 7.37 7.35   PO2ART 103* 84 83   [id number] 36.0 49.0* 55.0*   O2SAT 96.0 94.4* 94.2*     BNP  No results found for: BNP   D-Dimer:  Lab Results   Component Value Date    DDIMER 544 (H) 12/10/2021      Radiology:   1. ETT tip 4.3 cm above the ellie. 2. Persistent bilateral multi lobar airspace consolidations, most pronounced   in the lung bases.  Differential includes multi lobar pneumonia and ARDS     1. Assessment/Plan     Patient Active Problem List    Diagnosis Date Noted    Acute respiratory failure with hypoxia ([facility] [address] 12/09/2021    Pneumonia due to COVID-19 virus 12/07/2021     Overview Note:     Tested positive on 12/4/2021      Anxiety 12/28/2020     Overview Note:     Patient has anxiety. She was taking Vistaril as needed but states he still feels anxious.   BuSpar 5 mg twice a day  Adjustment insomnia 09/23/2020     Overview Note:     Pt has insomnia because of anxiety and stress  Trial of trazodone 50 mg daily . Side effects explained.  Abnormal mammogram 09/24/2019     Overview Note:     9/24/19 : abnormal : repeat in 6 months. Pt was informed by radiologist   Mammogram in 3/20 was normal.  Patient is followed by gynecologist  1/7/21 : Michelle Parmar normal at [address] Tachycardia 06/26/2018    Essential hypertension 06/26/2018     Overview Note:     Patient has hypertension. Pt is on diltiazem  mg bid       Adjustment disorder with anxious mood 03/23/2018     Overview Note:     Pt wants to try vistaril prn.  Thyroid ca ([address] 12/15/2015     Overview Note:     Had total thyroidectomy by Dr Nadia Jovel. On synthroid. Managed by Dr Crystal Wheeler.  -he increased synthroid to 125 mcg daily except  1 1/2 tablet on Sundays  Pt has f/u appointment in 11/2021      Cough variant asthma      Overview Note:     On, albuterol and singulair. Pt is on Symbicort: feels well. Acute Hypoxic resp failure sec to Cardiogenic and non cardiogenic Pulmonary edema  Bilateral Pneumonia sec to COVID-19  Sec. Hypothryroidism  Anxiety  Afib rate controlled  VDRF       1. Wean off paralytics  2. RASS of 0 to -1 as tolerated  3. Tube feeds  4. CPT  5. Keep sats > 88%  6. CXR in am  7. SAT and SBt trial as tolerated  8. Abx  9.  F/u C&S  10. C/w present management    Electronically signed by Wyatt Eisenmenger, MD on 12/19/2021 at 12:37 PM

## 2021-12-20 NOTE — PROGRESS NOTES
Pulmonary and Critical Care  Progress Note      VITALS:  /71   Pulse 79   Temp 98.9 °F (37.2 °C) (Rectal)   Resp 21   Ht 5' 5\" (1.651 m)   Wt 148 lb 9.4 oz (67.4 kg)   SpO2 96%   BMI 24.73 kg/m²     Subjective:   CHIEF COMPLAINT :SOB     HPI:                The patient is on the vent and sedated. She failed the vent wean. She is not in acute resp distress    Objective:   PHYSICAL EXAM:    LUNGS:Occasional basal crackles   Abd-soft, BS+,NT  Ext- no pedal edema  CVS-s1s2, no murmurs      DATA:    CBC:  Recent Labs     12/17/21  1238 12/17/21  1238 12/18/21  0500 12/19/21  0300 12/20/21  0650   WBC 21.9*   < > 20.5* 20.6* 17.6*   RBC 4.66   < > 4.33 3.58* 3.32*   HGB 14.6   < > 13.6 11.7* 10.4*   HCT 43.0   < > 40.6 33.4* 31.4*      < > 132* 132* 148   MCV 92.3   < > 93.8 93.3 94.6   MCH 31.3*   < > 31.4* 32.7* 31.3*   MCHC 34.0   < > 33.5 35.0 33.1   RDW 12.3   < > 12.4 12.5 12.3   SEGSPCT 83.0*  --  94.4* 90.8*  --    BANDSPCT 15*  --   --   --   --     < > = values in this interval not displayed. BMP:  Recent Labs     12/18/21  0500 12/19/21  0300 12/20/21  0650     --  140   K 4.0  --  4.3   CL 93*  --  102   CO2 26  --  30   BUN 33*  --  23   CREATININE 1.1 0.6 0.4*   CALCIUM 7.8*  --  8.2*   GLUCOSE 173*  --  174*      ABG:  Recent Labs     12/18/21  0600 12/19/21  0600 12/20/21  0600   PH 7.37 7.35 7.39   PO2ART 84 83 59*   [id number] 49.0* 55.0* 50.0*   O2SAT 94.4* 94.2* 89.4*     BNP  No results found for: BNP   D-Dimer:  Lab Results   Component Value Date    DDIMER 544 (H) 12/10/2021      1. Radiology: No interval change in multifocal pneumonia. Assessment/Plan     Patient Active Problem List    Diagnosis Date Noted    Acute respiratory failure with hypoxia ([facility] [address] 12/09/2021    Pneumonia due to COVID-19 virus 12/07/2021     Overview Note:     Tested positive on 12/4/2021      Anxiety 12/28/2020     Overview Note:     Patient has anxiety.   She was taking Vistaril as needed but states he still feels anxious. BuSpar 5 mg twice a day      Adjustment insomnia 09/23/2020     Overview Note:     Pt has insomnia because of anxiety and stress  Trial of trazodone 50 mg daily . Side effects explained.  Abnormal mammogram 09/24/2019     Overview Note:     9/24/19 : abnormal : repeat in 6 months. Pt was informed by radiologist   Mammogram in 3/20 was normal.  Patient is followed by gynecologist  1/7/21 : Davina Liz normal at [address] Tachycardia 06/26/2018    Essential hypertension 06/26/2018     Overview Note:     Patient has hypertension. Pt is on diltiazem  mg bid       Adjustment disorder with anxious mood 03/23/2018     Overview Note:     Pt wants to try vistaril prn.  Thyroid ca ([address] 12/15/2015     Overview Note:     Had total thyroidectomy by Dr Jalyn Rendon. On synthroid. Managed by Dr Maria G Beebe.  -he increased synthroid to 125 mcg daily except  1 1/2 tablet on Sundays  Pt has f/u appointment in 11/2021      Cough variant asthma      Overview Note:     On, albuterol and singulair. Pt is on Symbicort: feels well. Acute Hypoxic resp failure sec to Cardiogenic and non cardiogenic Pulmonary edema  Bilateral Pneumonia sec to COVID-19  Sec. Hypothryroidism  Anxiety  Afib rate controlled  VDRF       1. Abx  2. F/u C&S  3. Tube feeds  4. PT/OT  5. Keep sats > 92%  6. CPT  7. Daily SAT and SBT trial  8.  C.w present management    Electronically signed by Marion Gan MD on 12/20/2021 at 12:26 PM

## 2021-12-20 NOTE — PROGRESS NOTES
12/19/21 2228   Vent Information   Equipment Changed HME   Vent Type 980   Vent Mode AC/PC   Pressure Ordered 14   Rate Set 22 bmp   FiO2  40 %   SpO2 92 %   SpO2/FiO2 ratio 230   Sensitivity 3   PEEP/CPAP 12   I Time/ I Time % 1 s   Humidification Source HME   Nitric Oxide/Epoprostenol In Use? No   Vent Patient Data   Peak Inspiratory Pressure 27 cmH2O   Mean Airway Pressure 18 cmH20   Rate Measured 22 br/min   Vt Exhaled 565 mL   Minute Volume 12.4 Liters   I:E Ratio 1:1.7   Plateau Pressure 25 GDJ25   Static Compliance 45 mL/cmH2O   Total PEEP 13 cmH20   Auto PEEP 1 cmH20   Cough/Sputum   Sputum How Obtained Suctioned;Endotracheal   Cough Non-productive;Dry   Frequency Infrequent   Sputum Amount None   Tenacity None   Spontaneous Breathing Trial (SBT) RT Doc   Pulse 70   Breath Sounds   Right Upper Lobe Diminished   Right Middle Lobe Diminished   Right Lower Lobe Diminished   Left Upper Lobe Diminished   Left Lower Lobe Diminished   Additional Respiratory  Assessments   Resp 22   Position Semi-Holm's   Oral Care Completed? No   Alarm Settings   High Pressure Alarm 40 cmH2O   Delay Alarm 20 sec(s)   Low Minute Volume Alarm 2.5 L/min   Apnea (secs) 20 secs   High Respiratory Rate 40 br/min   Low Exhaled Vt  250 mL   Patient Observation   Observations Increased O2 to 50% due to a low SaO2 of 88%   ETT (adult)   Placement Date/Time: 12/16/21 (c) 1443   Preoxygenation: Yes  Mask Ventilation: Mask ventilation not attempted (0)  Technique: Video laryngoscopy  Tube Size: 7.5 mm  Laryngoscope: (c) Mac  Blade Size: 3  Location: Oral  Insertion attempts: 1  Placemen. ..    Secured at 25 cm   Measured From [address] [place] [address],[address] By Commercial tube wren   Site Condition Dry

## 2021-12-20 NOTE — PROGRESS NOTES
Infectious Disease Progress Note  2021   Patient Name: Macie Salter : 1970       Reason for visit: F/u COVID-19 pneumonia, acute respiratory failure? History:? Interval history noted  Intubated, mechanically ventilated  Physical Exam:  Vital Signs: /71   Pulse 79   Temp 98.9 °F (37.2 °C) (Rectal)   Resp 21   Ht 5' 5\" (1.651 m)   Wt 148 lb 9.4 oz (67.4 kg)   SpO2 97%   BMI 24.73 kg/m²     Gen: intubated and mechanically ventilated  Skin: no stigmata of endocarditis  Wounds: C/D/I  HEMT: AT/NC ETT  Eyes: PERRLA, EOMI, conjunctiva pink, sclera anicteric. Neck: Supple. Trachea midline. No LAD. Chest:  Reduced air entry bilaterally  Heart: RRR  Abd: soft, non-distended, no tenderness, no hepatomegaly. Normoactive bowel sounds. Ext: no clubbing, cyanosis, or edema  Catheter Site: without erythema or tenderness  LDA: CVC:  Neuro:intubated and sedated     Radiologic / Imaging / TESTING  12/15/2021 6:15 am     COMPARISON:   2021 at 0448 hours     HISTORY:   ORDERING SYSTEM PROVIDED HISTORY: Hypoxia   TECHNOLOGIST PROVIDED HISTORY:   Reason for exam:->Hypoxia   Reason for Exam: Hypoxia     FINDINGS:   Progressive/radiographically worse consolidating infiltrate throughout both   lung fields worst in the right mid-basilar region. Findings are consistent   with diffuse pneumonitis, atypical pneumonia, pulmonary edema which may be of   cardiogenic or noncardiogenic etiology. No detectable pleural effusion or   pneumothorax. Heart size projects the upper limit of normal and appears   stable. Impression:  Findings consistent with progressive/radiographically worse diffuse   pneumonitis/pneumonia compared to 2021. Pulmonary edema of cardiogenic   or noncardiogenic etiology may contribute to this appearance. No large areas   of pulmonary consolidation or detectable pleural effusions on the current   study.         Labs:    Recent Results (from the past 24 hour(s))   Blood gas, arterial    Collection Time: 12/20/21  6:00 AM   Result Value Ref Range    pH, Bld 7.39 7.34 - 7.45    pCO2, Arterial 50.0 (H) 32 - 45 MMHG    pO2, Arterial 59 (L) 75 - 100 MMHG    Base Exc, Mixed 4.2 (H) 0 - 2.3    HCO3, Arterial 30.3 (H) 18 - 23 MMOL/L    CO2 Content 31.8 (H) 19 - 24 MMOL/L    O2 Sat 89.4 (L) 96 - 97 %    Carbon Monoxide, Blood 2.2 0 - 5 %    Methemoglobin, Arterial 1.2 <1.5 %    Comment AC/PC 14,R22,55%+12    CBC auto differential    Collection Time: 12/20/21  6:50 AM   Result Value Ref Range    WBC 17.6 (H) 4.0 - 10.5 K/CU MM    RBC 3.32 (L) 4.2 - 5.4 M/CU MM    Hemoglobin 10.4 (L) 12.5 - 16.0 GM/DL    Hematocrit 31.4 (L) 37 - 47 %    MCV 94.6 78 - 100 FL    MCH 31.3 (H) 27 - 31 PG    MCHC 33.1 32.0 - 36.0 %    RDW 12.3 11.7 - 14.9 %    Platelets 220 184 - 372 K/CU MM    MPV 11.3 (H) 7.5 - 11.1 FL    Metamyelocytes Relative 2 (H) 0.0 %    Bands Relative 14 (H) 5 - 11 %    Segs Relative 75.0 (H) 36 - 66 %    Lymphocytes % 1.0 (L) 24 - 44 %    Monocytes % 8.0 (H) 0 - 4 %    Metamyelocytes Absolute 0.35 K/CU MM    Bands Absolute 2.46 K/CU MM    Segs Absolute 13.2 K/CU MM    Lymphocytes Absolute 0.2 K/CU MM    Monocytes Absolute 1.4 K/CU MM    Differential Type MANUAL DIFFERENTIAL     PLT Morphology FEW    Comprehensive Metabolic Panel    Collection Time: 12/20/21  6:50 AM   Result Value Ref Range    Sodium 140 135 - 145 MMOL/L    Potassium 4.3 3.5 - 5.1 MMOL/L    Chloride 102 99 - 110 mMol/L    CO2 30 21 - 32 MMOL/L    BUN 23 6 - 23 MG/DL    CREATININE 0.4 (L) 0.6 - 1.1 MG/DL    Glucose 174 (H) 70 - 99 MG/DL    Calcium 8.2 (L) 8.3 - 10.6 MG/DL    Albumin 3.1 (L) 3.4 - 5.0 GM/DL    Total Protein 5.2 (L) 6.4 - 8.2 GM/DL    Total Bilirubin 0.3 0.0 - 1.0 MG/DL    ALT 54 (H) 10 - 40 U/L    AST 49 (H) 15 - 37 IU/L    Alkaline Phosphatase 84 40 - 128 IU/L    GFR Non-African American >60 >60 mL/min/1.73m2    GFR African American >60 >60 mL/min/1.73m2    Anion Gap 8 4 - 16     CULTURE results: Invalid input(s): BLOOD CULTURE,  URINE CULTURE, SURGICAL CULTURE    Diagnosis:  Patient Active Problem List   Diagnosis    Cough variant asthma    Thyroid ca ([facility] [address]    Adjustment disorder with anxious mood    Tachycardia    Essential hypertension    Abnormal mammogram    Adjustment insomnia    Anxiety    Pneumonia due to COVID-19 virus    Acute respiratory failure with hypoxia (HCC)       Active Problems  Active Problems:    Pneumonia due to COVID-19 virus    Acute respiratory failure with hypoxia (HCC)  Resolved Problems:    * No resolved hospital problems. *      Impression and plan   Summary and rationale: Patient is a 45 y.o.  female medical history of anxiety/depression, hypertension, mild intermittent asthma thyroid cancer status post total thyroidectomy, received 1 dose of the Wolf Peter Covid vaccine on 10/29/2021 who was admitted 12/8/2021 for further evaluation and management of shortness of breath.  COVID-19 symptom onset:11/30/2021   COVID-19 test date: 12/4/2021   Expected discontinuation of isolation precautions: 20 days, end-date: 12/20/2021   Clinical status:critical disease, pct is on dwt, remains on the ventilator   Therapeutic:  o Ongoing antibiotics:    o Antiviral agent: remdesivir  o Immunomodulators:  - Dexamethasone: 12/8-  - Solu-Medrol: n/a  - Tocilizumab:received on 12/9/2021  - Baracitinib:  - IVIG:  - Enoxaparin 30 mg sc bid  o Completed antibiotics: d/c vancomycin and cefepime on 12/22/2021  o Other agents:    Diagnostic:   F/u:   Other:   Will sign off and not follow the patient actively, please reconsult as needed.  Primary care or hospitalist service to assume infectious disease care of patient. Patient may be transferred to another facility if higher level of care is needed.          Electronically signed by: Electronically signed by Vasyl Elizabeth MD on 12/20/2021 at 3:24 PM

## 2021-12-20 NOTE — PROGRESS NOTES
12/20/21 1321   Vent Information   Equipment Changed HME   Vent Type 980   Vent Mode AC/PC   Pressure Ordered 14   Rate Set 22 bmp   FiO2  60 %   SpO2 97 %   SpO2/FiO2 ratio 161.67   Sensitivity 3   PEEP/CPAP 12   Humidification Source HME   Vent Patient Data   Peak Inspiratory Pressure 29 cmH2O   Mean Airway Pressure 18 cmH20   Rate Measured 25 br/min   Vt Exhaled 447 mL   Minute Volume 17.4 Liters   I:E Ratio 1:2.5   Breath Sounds   Right Upper Lobe Diminished   Right Middle Lobe Diminished   Right Lower Lobe Diminished   Left Upper Lobe Diminished   Left Lower Lobe Diminished   Additional Respiratory  Assessments   Position Semi-Holm's   Alarm Settings   High Pressure Alarm 40 cmH2O   Delay Alarm 20 sec(s)   Low Minute Volume Alarm 2.5 L/min   Apnea (secs) 20 secs   High Respiratory Rate 40 br/min   Low Exhaled Vt  250 mL   ETT (adult)   Placement Date/Time: 12/16/21 (c) 7628   Preoxygenation: Yes  Mask Ventilation: Mask ventilation not attempted (0)  Technique: Video laryngoscopy  Tube Size: 7.5 mm  Laryngoscope: (c) Mac  Blade Size: 3  Location: Oral  Insertion attempts: 1  Placemen. ..    Secured at 25 cm   Measured From [address] [place] [address],[address] By [employer] tube wren   Site Condition Dry

## 2021-12-20 NOTE — PROGRESS NOTES
Hospitalist Progress Note      PCP: Brit Gray MD    Date of Admission: 2021    Chief Complaint on Admission: SOB    Pt Seen/Examined and Chart Reviewed. Admitting dx covid pneumonia    SUBJECTIVE:   Seen at bedside this morning. RN present and case D/W him  No new issues overnight. Off paralytic. OBJECTIVE:   Vitals    TEMPERATURE:  Current - Temp: 98.9 °F (37.2 °C); Max - Temp  Av.2 °F (36.8 °C)  Min: 97.2 °F (36.2 °C)  Max: 98.9 °F (37.2 °C)  RESPIRATIONS RANGE: Resp  Av.5  Min: 18  Max: 26  PULSE RANGE: Pulse  Av.3  Min: 67  Max: 92  BLOOD PRESSURE RANGE:  Systolic (44UHJ), HRP:219 , Min:95 , GYX:838    Diastolic (31OET), XIU:68, Min:57, Max:74    PULSE OXIMETRY RANGE: SpO2  Av %  Min: 88 %  Max: 98 %  24HR INTAKE/OUTPUT:      Intake/Output Summary (Last 24 hours) at 2021 1704  Last data filed at 2021 6280  Gross per 24 hour   Intake 2238.88 ml   Output 2025 ml   Net 213.88 ml       Exam:  General appearance: Intubated and sedated on vent. HEENT Normal cephalic, atraumatic without obvious deformity. Neck: Supple,Trachea midline without thyromegaly or adenopathy with full range of motion. Lungs: Bilateral good air entry,no wheezing or crackles  Heart: Regular rhythm ,tachycardic. Abdomen: Soft, non-tender or non-distended without rigidity or guarding and positive bowel sounds all four quadrants. [place] with good UOP  Extremities: No clubbing, cyanosis, or edema bilaterally. Skin: Skin color, texture, turgor normal. No rashes or lesions. Warm  Neurologic: Sedated.       Data    Recent Labs     21  0500 21  0300 21  0650   WBC 20.5* 20.6* 17.6*   HGB 13.6 11.7* 10.4*   HCT 40.6 33.4* 31.4*   * 132* 148      Recent Labs     21  0500 21  0300 21  0650     --  140   K 4.0  --  4.3   CL 93*  --  102   CO2 26  --  30   BUN 33*  --  23   CREATININE 1.1 0.6 0.4*     Recent Labs     21  0500 21  0300 respiratory failure with hypoxia ([facility] [address] [J96.01] 12/09/2021    Pneumonia due to COVID-19 virus [U07.1, J12.82] 12/07/2021       Assessment /Plan    Acute hypoxic respiratory failure  Intubated 12/16 for worsening resp failure. On Vent ,60%FIO2. Pulmonary following. Continue nebs ,abx and steroid  Off vencuronium. Continue propofol and fentanyl and versed. Repeat labs in AM.     Severe sepsis secondary to COVID-19 pneumonia  -CT chest did not show any PE, only patchy opacities bilaterally. -CRP and procal trending down. WBC trending down  -On iv cefepime and vanco from 12/16  -Not on pressors  -Received Tocilizumab 12/09 and on Decadron/barcitinib. ID followed. Pulmonology following. Follow cultures. Mood disorder:   On buspirone     Hypothyroidism:   On levothyroxine 125 mcg daily  Check TSH     PAF ? / Hypotension  Pt was on Cardizem 120 mg twice a  prior to admission, seems like an odd dose. Cardiology consulted. Saw pt and does not think she has atrial fibrillation Cardizem was held then started on IV fluids and echo done. Echo good. Hypotension resolved. Cardiology followed. Pt on lasix drip per cardiology recs. H/o HTN--was on diltiazem,stopped d/t hypotension. Started on midodrine by cardio and dose increased 12/15 secondary to hypotension. Midodrine held 12/18 sec to elevated BP/tachy. Lopressor prn and PO Cardizem scheduled started without much improvement and occasional hypotension. D/carmen and started on Cardizem drip and midodrine and now better. DVT Prophylaxis: Therapeutic dose Lovenox. Nutrition: Tube feed. Dietician following.     Code Status: Full Code      Wilber Norris MD  Hospitalist

## 2021-12-20 NOTE — ADT AUTH CERT
Pneumonia - Care Day 9 (12/17/2021) by Lotus Sahu RN       Review Status Review Entered   Completed 12/20/2021 12:49      Criteria Review      Care Day: 9 Care Date: 12/17/2021 Level of Care: ICU    Guideline Day 2    Clinical Status    (X) * No CO2 retention or acidosis    ( ) * No requirement for mechanical ventilation    12/20/2021 12:49 PM EST by Hermelindo Sames      vent fio2 @ 55%    (X) * Hypotension absent    12/20/2021 12:49 PM EST by Hermelindo Sames      122/90    ( ) * Afebrile or fever improved    12/20/2021 12:49 PM EST by Hermelindo Sames      101.3    ( ) * No hypoxia on room air or oxygenation improved    12/20/2021 12:49 PM EST by [place] Sames      fio2 @ 55    ( ) * Mental status improved or at baseline    12/20/2021 12:49 PM EST by Kristal Beckford intubated and sedated    Activity    ( ) * Increased activity    Routes    (X) Oral medications    12/20/2021 12:49 PM EST by [place] Sames      cont current meds    (X) Usual diet    Interventions    (X) Incentive spirometry    (X) Pulse oximetry    12/20/2021 12:49 PM EST by Hermelindo Sames      continuous  94-98%    (X) Possible oxygen    12/20/2021 12:49 PM EST by Hermelindo Sames      vent    Medications    (X) IV or oral antibiotics    12/20/2021 12:49 PM EST by [place] Sames      cont current meds    * Milestone   Additional Notes   12/17/2021      **ICU**      101.3 22 119 143/91 96% vent fio2 @ 55%      Cxr      1. Right greater than left bilateral airspace opacities without significant    change. 2. Right-sided PICC line with the tip overlying the distal SVC/right atrial    region. 3. Tip of the ET tube is 5.3 cm above the ellie.        Color, UA: STRAW (A)   Clarity, UA: CLEAR   Bilirubin, Urine: NEGATIVE   Ketones, Urine: NEGATIVE   Specific Gravity, UA: 1.020   Blood, Urine: TRACE (A)   Protein, UA: NEGATIVE   Urobilinogen, Urine: NORMAL   Leukocyte Esterase, Urine: NEGATIVE   Glucose, Urine: NEGATIVE   Nitrite Urine, Quantitative: NEGATIVE   pH, Urine: 5.0   Hyaline Casts, UA: 0   Mucus, UA: OCCASIONAL (A)   WBC, UA: 1   RBC, UA: 37 (H)   Squam Epithel, UA: <1   Bacteria, UA: NEGATIVE   Trichomonas, UA: NONE SEEN   URINE RT REFLEX TO CULTURE: Rpt (A)      Chloride: 96 (L)   BUN: 27 (H)   GFR Non-: 58 (L)   Glucose: 142 (H)      CRP, High Sensitivity: 62.6      WBC: 23.6 (H)   Platelet Count: 477 (L)         pH, Bld: 7.42   Base Excess: 1   O2 Sat: 96.0   Carbon Monoxide, Blood: 2.0   CO2 Content: 24.5 (H)   pCO2, Arterial: 36.0   pO2, Arterial: 103 (H)   HCO3, Arterial: 23.4 (H)   Methemoglobin, Arterial: 1.7 (H)      WBC: 21.9 (H)   Lactic Acid, Sepsis: 2.2 (HH)      Additional meds      Cont fentaNYL (SUBLIMAZE) 1,000 mcg in sodium chloride 0.9% 100 mL infusion   Rate: 1.3-20 mL/hr Dose: 12.5-200 mcg/hr   Freq: CONTINUOUS Route: IV @ 7.5 ml/hr      Cont furosemide (LASIX) 100 mg in dextrose 5 % 100 mL infusion   Rate: 5 mL/hr Dose: 5 mg/hr   Freq: CONTINUOUS Route: IV       Cont propofol injection   Rate: 2.1-33.5 mL/hr Dose: 5-80 mcg/kg/min   Weight Dosing Info: 69.7 kg   Freq: CONTINUOUS Route: IV @ 29.3 ml/hr      Cont vecuronium (NORCURON) 100 mg in sodium chloride 0.9 % 100 mL infusion @ 3.3 ml/hr      acetaminophen (TYLENOL) tablet 650 mg   Dose: 650 mg   Freq: EVERY 6 HOURS PRN Route: PO x3 (ng)   =======================================================      Per pulmo/ CC med      Acute Hypoxic resp failure sec to Cardiogenic and non cardiogenic Pulmonary edema   Bilateral Pneumonia sec to COVID-19   Sec. Hypothryroidism   Anxiety   Afib rate controlled   VDRF           1. Decadron   2. Insulin   3. Inhalers   4. Abx   5. F/u C&S   6. Keep sats > 88%   7. RASS of -1 to -2   8. Prone ventilation as tolerated   9. CXR in am   10. CPT   11.  C.w present management   ____________________________________________________________      Per IM      Assessment Liliana Reva       Acute hypoxic respiratory failure Increased activity    Routes    (X) Oral medications    12/20/2021 12:36 PM EST by Livia Eller      cont current med plus  olumiant 4 mg qd po    (X) Usual diet    Interventions    (X) Incentive spirometry    (X) Pulse oximetry    (X) Possible oxygen    Medications    (X) IV or oral antibiotics    12/20/2021 12:36 PM EST by Livia Eller      iv maxipime 2000 mg q12  iv vanco 1000 mg q12    * Milestone   Additional Notes   12/16/2021      **ICU**    vs see above       Additional meds      albuterol sulfate  (90 Base) MCG/ACT inhaler 4 puff   Dose: 4 puff   Freq: EVERY 4 HOURS Route: IN   And   ipratropium (ATROVENT HFA) 17 MCG/ACT inhaler 4 puff   Dose: 4 puff   Freq: EVERY 4 HOURS Route: IN      enoxaparin (LOVENOX) injection 70 mg   Dose: 1 mg/kg   Weight Dosing Info: 69.7 kg   Freq: 2 TIMES DAILY Route: SC      famotidine (PEPCID) injection 20 mg   Dose: 20 mg   Freq: 2 TIMES DAILY Route: IV      LORazepam (ATIVAN) injection 0.5 mg   Dose: 0.5 mg   Freq: ONCE Route: IV      rocuronium (ZEMURON) injection 70 mg   Dose: 1 mg/kg   Weight Dosing Info: 69.7 kg   Freq: ONCE Route: IV      dexmedetomidine (PRECEDEX) 400 mcg in sodium chloride 0.9 % 100 mL infusion @ 6.97 ml/hr       fentaNYL (SUBLIMAZE) 1,000 mcg in sodium chloride 0.9% 100 mL infusion   Rate: 1.3-20 mL/hr Dose: 12.5-200 mcg/hr   Freq: CONTINUOUS Route: IV @ 5 ml/hr      furosemide (LASIX) 100 mg in dextrose 5 % 100 mL infusion   Rate: 5 mL/hr Dose: 5 mg/hr   Freq: CONTINUOUS Route: IV      propofol injection   Rate: 2.1-33.5 mL/hr Dose: 5-80 mcg/kg/min   Weight Dosing Info: 69.7 kg   Freq: CONTINUOUS Route: IV @ 33.5 ml/hr      vecuronium (NORCURON) 100 mg in sodium chloride 0.9 % 100 mL infusion   Rate: 2.1-7.1 mL/hr Dose: 0.5-1.7 mcg/kg/min   Weight Dosing Info: 69.7 kg   Freq: CONTINUOUS Route: IV @ 2.1 ml/hr      acetaminophen (TYLENOL) suppository 650 mg   Dose: 650 mg   Freq: EVERY 6 HOURS PRN Route: RE x1      Cxr   Increased bilateral airspace opacities suggesting increasing edema or    infection       Cxr post intubation      1. Endotracheal tube 3.6 cm above the ellie. 2. Severe extensive patchy bilateral airspace opacities compatible with COVID    Pneumonia. pH, Bld: 7.17 (L)   Base Excess: 5 (H)   O2 Sat: 94.0 (L)   Carbon Monoxide, Blood: 2.2   CO2 Content: 27.3 (H)   pCO2, Arterial: 69.0 (H)   pO2, Arterial: 90   HCO3, Arterial: 25.2 (H)   Methemoglobin, Arterial: 1.4      Per RN  note @ 2793   Attempted to put patient on Airvo at 60L, 92% for med admin, meal.  Patient did not tolerate, almost immediately dropped to 78%, patient placed back on bipap at 100% fiO2.  Will continue to monitor. Per IM ( RRT note)   Rapid response called as hypoxic in 58 and bagging at bedside and had ventilator dyschronicity and ordered paralytic and also drip short term. PICC line ordered. Decided to prone with improvement of oxygen in 80s. CXR pending   ==========================================================   Per ID         Impression and plan   · Summary and rationale: Patient is a 45 y.o.  female medical history of anxiety/depression, hypertension, mild intermittent asthma thyroid cancer status post total thyroidectomy, received 1 dose of the [employer] [place] Covid vaccine on 10/29/2021 who was admitted 12/8/2021 for further evaluation and management of shortness of breath. · COVID-19 symptom onset:11/30/2021   · COVID-19 test date: 12/4/2021   · Expected discontinuation of isolation precautions: 20 days, end-date: 12/20/2021   · Clinical status:day 17 of disease, clinically same- severe disease   · Therapeutic:   ? Ongoing antibiotics:prescribed cefepime by hospitalist   ? Antiviral agent: remdesivir   ? Immunomodulators:   § Dexamethasone: 12/8-   § Solu-Medrol: n/a   § Tocilizumab:received on 12/9/2021   § Baracitinib:   § IVIG:   § Enoxaparin 30 mg sc bid   ? Completed antibiotics: n/a   ?  Other agents:    · Diagnostic: · F/u:   · Other:spoke to patient and , she agrees to intubated.    ______________________________________________________________      Per pulmo      Acute Hypoxic resp failure sec to Cardiogenic and non cardiogenic Pulmonary edema   Bilateral Pneumonia sec to COVID-19   Sec. Hypothryroidism   Anxiety   Afib rate controlled           1. IV fluids   2. CXR now   3. Keep sats > 92%   4. ICS   5. OOB   6. Decadron   7. Insulin   8. Inhalers   9. Keep sats > 88%   10. Empiric Abx   11. F/u C&S   12. Prone ventilation as tolerated   13. C/w present management   14. Intubate as patient is requesting   ___________________________________________________________      Per cardio         Plan:   1. ECHO is normal LVEF,    2. covid 19' Still requires 100% NRB   3. Hypotension:  oral midodrine 10 mg PO q8 hrs   4. Critical care 35 mins   5. All labs, medications and tests reviewed, continue all other medications of all above medical condition listed as is.    ________________________________________________________      Per IM           Assessment /Plan       Acute hypoxic respiratory failure   -Was still hypoxic on 1005 BiPAP and also with increased work of breathing   -Intubated this afternoon. Currently on 100% FIO2. Episode of hypoxia sec to vent dysynchrony resolved with paralytic and proning. Continue to monitor. Pulmonary following. Repeat labs now.       Severe sepsis secondary to COVID-19 pneumonia   CT chest did not show any PE, only patchy opacities bilaterally. Procal low at 0.05.  Slight increased to 0.09.  Repeat labs not done. CRP elevated 178-11 now. Received Tocilizumab 12/09 and on Decadron. ID followed and does not recommend antibiotics. Leukocytosis likely secondary to steroids   Labs otherwise unremarkable   Quantiferon indeterminate   Pulmonology following.    Vanc/Cefepime started by cardio 12/16.       Mood disorder:    On buspirone       Hypothyroidism:    On levothyroxine 125 mcg daily       PAF ? / Hypotension   Pt was on Cardizem 120 mg twice a day seems like an odd dose. Cardiology consulted. Saw pt and does not think she has atrial fibrillation Cardizem was held then started on IV fluids and echo done. Echo good. Hypotension resolved. Cardiology following and she was given iv Cardizem today for tachycardia and started on lasix drip. CXR 12/16 with severe extensive patchy bilateral airspace opacities compatible with COVID  pneumonia. Continue to monitor.       H/o HTN--was on diltiazem, stopped d/t hypotension. Started on midodrine by cardio and dose increased 12/15 secondary to hypotension.       DVT Prophylaxis: SQ Lovenox full dose.       Diet: Diet NPO        Code Status: Full Code        Critically ill pt. Critical care time spent 40 mins.

## 2021-12-20 NOTE — PROGRESS NOTES
12/20/21 0933   Vent Information   $Ventilation $Subsequent Day   Vent Type 980   Vent Mode AC/PC   Pressure Ordered 14   Rate Set 22 bmp   FiO2  55 %   SpO2 94 %   SpO2/FiO2 ratio 170.91   Sensitivity 3   PEEP/CPAP 12   Humidification Source HME   Nitric Oxide/Epoprostenol In Use? No   Vent Patient Data   Peak Inspiratory Pressure 27 cmH2O   Mean Airway Pressure 19 cmH20   Rate Measured 26 br/min   Vt Exhaled 641 mL   Minute Volume 16.8 Liters   I:E Ratio 1:1.4   Plateau Pressure 25 NZR19   Static Compliance 45 mL/cmH2O   Total PEEP 13 cmH20   Auto PEEP 0.6 cmH20   Breath Sounds   Right Upper Lobe Diminished   Right Middle Lobe Diminished   Right Lower Lobe Diminished   Left Upper Lobe Diminished   Left Lower Lobe Diminished   Additional Respiratory  Assessments   Position Semi-Holm's   Oral Care Completed?  No   Oral Care Mouth suctioned   Alarm Settings   High Pressure Alarm 40 cmH2O   Delay Alarm 20 sec(s)   Low Minute Volume Alarm 2.5 L/min   Apnea (secs) 20 secs   High Respiratory Rate 40 br/min   Low Exhaled Vt  250 mL   Non-Surgical Airway 12/16/21 Endo Tracheal Tube   Placement Date/Time: 12/16/21 1757   Placed By: Licensed provider  Inserted by: [facility] CRNA   Insertion attempts: 1  Airway Device: Endo Tracheal Tube  Size: 7.5   Secured at 25 cm   Measured From [address] By [employer] tube wren   Site Condition Dry

## 2021-12-21 NOTE — PROGRESS NOTES
Comprehensive Nutrition Assessment    Type and Reason for Visit:  Reassess    Nutrition Recommendations/Plan:   Continue current EN regimen as ordered  Will continue to monitor GI tolerance, nutrition status, poc    Nutrition Assessment:  pt remains sedated on vent, EN running @ 45 ml/hr per flowsheet, tolerating EN at goal rate, will follow at high nutrition risk    Malnutrition Assessment:  Malnutrition Status: At risk for malnutrition (Comment)    Context:  Acute Illness       Estimated Daily Nutrient Needs:  Energy (kcal):  1855; Weight Used for Energy Requirements:  Current     Protein (g):  85; Weight Used for Protein Requirements:  Ideal        Fluid (ml/day):  2000; Method Used for Fluid Requirements:  1 ml/kcal      Nutrition Related Findings:  WBC 22.9      Wounds:  None       Current Nutrition Therapies:    Current Tube Feeding (TF) Orders:  · Feeding Route: Nasogastric  · Formula: Peptide Based High Protein  · Schedule: Continuous (45 ml/hr per flowsheet)  · Current TF & Flush Orders Provides: 1080 kcal and 94 g protein  Additional Calorie Sources:   pt is receiving ~718 kcal from current propofol rate    Anthropometric Measures:  · Height: 5' 5\" (165.1 cm)  · Current Body Weight: 148 lb 9.4 oz (67.4 kg) ((12/18))   · Admission Body Weight: 153 lb (69.4 kg)    · Usual Body Weight: 162 lb (73.5 kg)     · Ideal Body Weight: 125 lbs; % Ideal Body Weight 118.9 %   · BMI: 24.7  · BMI Categories: Normal Weight (BMI 18.5-24. 9)       Nutrition Diagnosis:   · Inadequate oral intake related to acute injury/trauma as evidenced by NPO or clear liquid status due to medical condition    Nutrition Interventions:   Food and/or Nutrient Delivery:  Continue Current Tube Feeding  Nutrition Education/Counseling:  No recommendation at this time   Coordination of Nutrition Care:  Continue to monitor while inpatient    Goals:  pt will meet greater than 75% of estimated nutrient needs via EN     Nutrition Monitoring and Evaluation:   Behavioral-Environmental Outcomes:  None Identified   Food/Nutrient Intake Outcomes:  Enteral Nutrition Intake/Tolerance  Physical Signs/Symptoms Outcomes:  Biochemical Data,GI Status,Weight,Skin,Hemodynamic Status,Fluid Status or Edema     Discharge Planning:     Too soon to determine     Electronically signed by Jorge Garcia MS, RD, LD on 12/21/21 at 12:37 PM EST    Contact: [phone]

## 2021-12-21 NOTE — PROGRESS NOTES
Pulmonary and Critical Care  Progress Note      VITALS:  BP (!) 147/74   Pulse 81   Temp 99.3 °F (37.4 °C) (Rectal)   Resp 19   Ht 5' 5\" (1.651 m)   Wt 148 lb 9.4 oz (67.4 kg)   SpO2 93%   BMI 24.73 kg/m²     Subjective:   CHIEF COMPLAINT :SOB     HPI:                The patient is on the vent and sedated. She is responding to painful stimuli. She is in mild resp distress    Objective:   PHYSICAL EXAM:    LUNGS:Occasional basal crackles   Abd-soft, BS+,NT  Ext- no pedal edema  CVS-s1s2, no murmurs      DATA:    CBC:  Recent Labs     12/19/21  0300 12/20/21  0650 12/21/21  0530   WBC 20.6* 17.6* 22.9*   RBC 3.58* 3.32* 3.37*   HGB 11.7* 10.4* 10.4*   HCT 33.4* 31.4* 31.8*   * 148 174   MCV 93.3 94.6 94.4   MCH 32.7* 31.3* 30.9   MCHC 35.0 33.1 32.7   RDW 12.5 12.3 12.6   SEGSPCT 90.8* 75.0* 76.0*   BANDSPCT  --  14* 14*      BMP:  Recent Labs     12/19/21  0300 12/20/21  0650   NA  --  140   K  --  4.3   CL  --  102   CO2  --  30   BUN  --  23   CREATININE 0.6 0.4*   CALCIUM  --  8.2*   GLUCOSE  --  174*      ABG:  Recent Labs     12/19/21  0600 12/20/21  0600 12/21/21  0600   PH 7.35 7.39 7.45   PO2ART 83 59* 80   XGX4RJS 55.0* 50.0* 44.0   O2SAT 94.2* 89.4* 94.1*     BNP  No results found for: BNP   D-Dimer:  Lab Results   Component Value Date    DDIMER 544 (H) 12/10/2021      1. Radiology:   Slightly improved appearance of multifocal pneumonia. Assessment/Plan     Patient Active Problem List    Diagnosis Date Noted    Acute respiratory failure with hypoxia ([facility] [address] 12/09/2021    Pneumonia due to COVID-19 virus 12/07/2021     Overview Note:     Tested positive on 12/4/2021      Anxiety 12/28/2020     Overview Note:     Patient has anxiety. She was taking Vistaril as needed but states he still feels anxious. BuSpar 5 mg twice a day      Adjustment insomnia 09/23/2020     Overview Note:     Pt has insomnia because of anxiety and stress  Trial of trazodone 50 mg daily . Side effects explained.

## 2021-12-21 NOTE — PROGRESS NOTES
6  DC after tomorrow's dose  Wean as tolerated      Mood disorder: Buspirone     Hypothyroidism: Levothyroxine     A. fib: Continue diltiazem infusion  Weaned off  Appreciate cardiology input      DVT prophlaxis:   *Treatment dose Lovenox    Last BM:   12/14    Ambulation:   Currently intubated     Disposition:   Home versus SNF  Diet: Tube feeding    Physical Exam Performed:       BP (!) 147/72   Pulse 72   Temp 98.7 °F (37.1 °C) (Rectal)   Resp 21   Ht 5' 5\" (1.651 m)   Wt 148 lb 9.4 oz (67.4 kg)   SpO2 94%   BMI 24.73 kg/m²     Physical Exam  Constitutional:       General: She is not in acute distress. HENT:      Head: Normocephalic and atraumatic. Eyes:      Pupils: Pupils are equal, round, and reactive to light. Cardiovascular:      Rate and Rhythm: Normal rate and regular rhythm. Heart sounds: No murmur heard. Pulmonary:      Effort: Pulmonary effort is normal. No respiratory distress. Breath sounds: Normal breath sounds. No wheezing. Abdominal:      General: Bowel sounds are normal. There is no distension. Palpations: Abdomen is soft. Tenderness: There is no abdominal tenderness. Musculoskeletal:         General: No swelling. Cervical back: Normal range of motion. Skin:     General: Skin is warm. Neurological:      Cranial Nerves: No cranial nerve deficit. Comments: intubated         Labs:   Recent Labs     12/19/21  0300 12/20/21  0650 12/21/21  0530   WBC 20.6* 17.6* 22.9*   HGB 11.7* 10.4* 10.4*   HCT 33.4* 31.4* 31.8*   * 148 174     Recent Labs     12/19/21  0300 12/20/21  0650   NA  --  140   K  --  4.3   CL  --  102   CO2  --  30   BUN  --  23   CREATININE 0.6 0.4*   CALCIUM  --  8.2*     Recent Labs     12/19/21  0300 12/20/21  0650   AST 36 49*   ALT 40 54*   BILIDIR 0.2  --    BILITOT 0.2 0.3   ALKPHOS 80 84     No results for input(s): INR in the last 72 hours.   No results for input(s): Tristan Sharp in the last 72 hours.    Urinalysis:      Lab Results   Component Value Date    NITRU NEGATIVE 12/18/2021    WBCUA 2 12/18/2021    BACTERIA RARE 12/18/2021    RBCUA 17 12/18/2021    BLOODU SMALL 12/18/2021    SPECGRAV 1.026 12/18/2021       Radiology:  XR CHEST PORTABLE   Final Result   Slightly improved appearance of multifocal pneumonia. XR CHEST PORTABLE   Final Result   No interval change in multifocal pneumonia. XR CHEST PORTABLE   Final Result   1. ETT tip 4.3 cm above the ellie. 2. Persistent bilateral multi lobar airspace consolidations, most pronounced   in the lung bases. Differential includes multi lobar pneumonia and ARDS. XR CHEST PORTABLE   Final Result   Mild improvement in opacities in the mid upper lungs. The mid to lower lung   opacities are not changed from the immediate prior exam.  Right side improved   from 12/17/2021 but increased at the left base. XR CHEST PORTABLE   Final Result   Moderate diffuse bilateral lung airspace disease which has worsened on the   left but improved on the right. Lines and tubes are stable. XR CHEST PORTABLE   Final Result   1. Right greater than left bilateral airspace opacities without significant   change. 2. Right-sided PICC line with the tip overlying the distal SVC/right atrial   region. 3. Tip of the ET tube is 5.3 cm above the ellie. XR ABDOMEN FOR NG/OG/NE TUBE PLACEMENT   Final Result   Tip and side hole of the enteric tube are below the GE junction. XR CHEST PORTABLE   Final Result   1. Endotracheal tube 3.6 cm above the ellie. 2. Severe extensive patchy bilateral airspace opacities compatible with COVID   pneumonia. XR CHEST PORTABLE   Final Result   Increased bilateral airspace opacities suggesting increasing edema or   infection         XR CHEST PORTABLE   Final Result   Findings consistent with progressive/radiographically worse diffuse   pneumonitis/pneumonia compared to 12/12/2021. Pulmonary edema of cardiogenic   or noncardiogenic etiology may contribute to this appearance. No large areas   of pulmonary consolidation or detectable pleural effusions on the current   study. XR CHEST PORTABLE   Final Result   Subtle bilateral pulmonary infiltrates, right greater than left, without   acute interval change. XR CHEST PORTABLE   Final Result   Patchy airspace opacities bilaterally, may be related to pulmonary edema   versus pneumonia, progressed. CTA PULMONARY W CONTRAST   Final Result   No evidence of pulmonary embolism. Extensive scattered areas of patchy and ground-glass opacities are noted   throughout the bilateral lungs. These are nonspecific but could indicate an   atypical/viral pneumonia. XR CHEST (2 VW)   Final Result   Patchy infiltrates within the lower lungs bilaterally, most compatible with   multifocal pneumonia. Consider both typical and atypical etiologies,   including viral pneumonia. There is mild pulmonary vascular congestion, and therefore correlate with any   clinical evidence of superimposed pulmonary edema.          XR CHEST PORTABLE    (Results Pending)           Nelida Weems MD  12/21/2021 12:43 PM

## 2021-12-21 NOTE — PLAN OF CARE
Nutrition Problem #1: Inadequate oral intake  Intervention: Food and/or Nutrient Delivery: Continue Current Tube Feeding  Nutritional Goals: pt will meet greater than 75% of estimated nutrient needs via EN

## 2021-12-22 NOTE — CARE COORDINATION
Patient remains on Vent on COVID unit. Phoned and spoke with patient's  Ayana Benz. He confirmed that patient is from home with him and is independent. Patient has PCP and insurance to assist with RX coverage . Case Management to follow to assist with any potential discharge needs.

## 2021-12-22 NOTE — ADT AUTH CERT
Pneumonia - Care Day 12 (12/20/2021) by Carol Sanchez RN       Review Status Review Entered   Completed 12/21/2021 10:18      Criteria Review      Care Day: 12 Care Date: 12/20/2021 Level of Care: ICU    Guideline Day 2    Clinical Status    (X) * No CO2 retention or acidosis    ( ) * No requirement for mechanical ventilation    12/21/2021 10:18 AM EST by Fadi Mccarthy remains on the vent    (X) * Hypotension absent    12/21/2021 10:18 AM EST by Yeimi Boyer      99 (37.2) 23 87 139/74    (X) * Afebrile or fever improved    12/21/2021 10:18 AM EST by Yeimi Boyer      99.0    ( ) * No hypoxia on room air or oxygenation improved    12/21/2021 10:18 AM EST by Yeimi Boyer      98% on 60% fio2 per vent    ( ) * Mental status improved or at baseline    Activity    ( ) * Increased activity    Routes    ( ) Oral medications    12/21/2021 10:18 AM EST by Yeimi Boyer      cardizem iv gtt, albuterol x6, atrovent x6, olumiant po x1, buspar po x2, tums, maxipime iv x3, decadron iv x1, lovenox sc x1, pepcid iv x2, synthroid po x1, proamatine po x1, singulair po x1, vanco iv x2, cardizem iv gtt, fentanyl iv gtt, versed    (X) Usual diet    Interventions    (X) Incentive spirometry    (X) Pulse oximetry    (X) Head of bed at 30 degrees    (X) Possible oxygen    Medications    (X) IV or oral antibiotics    * Milestone   Additional Notes   DATE: 12/20 day 12 ICU       Pertinent Updates:   Seen at bedside this morning. RN present and case D/W her. States they are still trying to wean her off vecuronium.    Intubated and on vent. Sedated. Tmax 100.8. Overnight has issues with increased HR and low BP with giving Cardizem. This AM,BP was high and HR also high. Was given Cardizem po but not helping. Noted her feet are cold but pulses palpable. Will continue to monitor.       Physical Exam:   General appearance: Intubated and sedated on vent. HEENT Normal cephalic, atraumatic without obvious deformity.     Neck: Supple,Trachea midline without thyromegaly or adenopathy with full range of motion. Lungs: Bilateral good air entry,no wheezing or crackles   Heart: Regular rhythm ,tachycardic. Abdomen: Soft, non-tender or non-distended without rigidity or guarding and positive bowel sounds all four quadrants. Tirado with good UOP   Extremities: No clubbing, cyanosis, or edema bilaterally.     Skin: Skin color, texture, turgor normal. No rashes or lesions. Warm   Neurologic: Sedated. Abnl/Pertinent Labs/Radiology/Diagnostic Studies:   Wbc 17.6, hgb 10.4, hct 31.4, creat 0.4, ast 49, alt 54,       MD Consults/Assessments & Plans:   ASSESSMENT AND PLAN         Active Hospital Problems     Diagnosis Date Noted   · Acute respiratory failure with hypoxia (HCC) [J96.01] 12/09/2021   · Pneumonia due to COVID-19 virus [U07.1, J12.82] 12/07/2021           Assessment /Plan       Acute hypoxic respiratory failure   Intubated 12/16 for worsening resp failure. On Vent ,55%FIO2. Pulmonary following. Continue nebs ,abx and steroid   Off vencuronium. Continue propofol and fentanyl and versed. Repeat labs in AM.        Severe sepsis secondary to COVID-19 pneumonia   -CT chest did not show any PE, only patchy opacities bilaterally. -CRP and procal trending down. WBC trending down   -On iv cefepime and vanco from 12/16   -Not on pressors   -Received Tocilizumab 12/09 and on Decadron/barcitinib. ID followed. Pulmonology following. Follow cultures.       Mood disorder:    On buspirone       Hypothyroidism:    On levothyroxine 125 mcg daily   Check TSH       PAF ? / Hypotension   Pt was on Cardizem 120 mg twice a  prior to admission, seems like an odd dose. Cardiology consulted. Saw pt and does not think she has atrial fibrillation Cardizem was held then started on IV fluids and echo done. Echo good. Hypotension resolved. Cardiology followed. Pt on lasix drip per cardiology recs.    H/o HTN--was on diltiazem,stopped d/t hypotension. Started on midodrine by cardio and dose increased 12/15 secondary to hypotension. Midodrine held 12/18 sec to elevated BP/tachy. Lopressor prn and PO Cardizem scheduled started without much improvement and occasional hypotension. D/carmen and started on Cardizem drip and midodrine and now better.       DVT Prophylaxis: Therapeutic dose Lovenox.       Nutrition: Tube feed. Dietician following.       Code Status: Full Code       PULM:    The patient is on the vent and sedated. She failed the vent wean. She is not in acute resp distress      Acute Hypoxic resp failure sec to Cardiogenic and non cardiogenic Pulmonary edema   Bilateral Pneumonia sec to COVID-19   Sec. Hypothryroidism   Anxiety   Afib rate controlled   VDRF           1. Abx   2. F/u C&S   3. Tube feeds   4. PT/OT   5. Keep sats > 92%   6. CPT   7. Daily SAT and SBT trial   8. C.w present management           Pneumonia - Care Day 11 (12/19/2021) by Kristen Pabon RN       Review Status Review Entered   Completed 12/21/2021 10:08      Criteria Review      Care Day: 11 Care Date: 12/19/2021 Level of Care: ICU    Guideline Day 2    Clinical Status    (X) * No CO2 retention or acidosis    ( ) * No requirement for mechanical ventilation    (X) * Hypotension absent    12/21/2021 10:08 AM EST by Leigh Ann Tripp      97.2 (36.2) 22 67 118/67    (X) * Afebrile or fever improved    12/21/2021 10:08 AM EST by Leigh Ann Tripp      97.8    ( ) * No hypoxia on room air or oxygenation improved    12/21/2021 10:08 AM EST by Anselmo Roland 88% on 40% fio2, inc to 55% fio2    ( ) * Mental status improved or at baseline    12/21/2021 10:08 AM EST by Leigh Ann Tripp      Intubated and on vent. Sedated    Activity    ( ) * Increased activity    Routes    ( ) Oral hydration    12/21/2021 10:08 AM EST by Leigh Ann Tripp      IVF 50hr    ( ) Oral medications    12/21/2021 10:08 AM EST by Leigh Ann Tripp      albuterol x5, atrovent x5, olumiant po x1, buspar po x2, tums, maxipime iv x2, decadron iv x1, cardizem po x1, lovenox sc x2, pepcid iv x2, synthroid po x1, lopressor iv x1, proamatine po x1, lopressor iv x1, vanco iv x2, vit d po x1, cardizem iv gtt    (X) Usual diet    Interventions    (X) Incentive spirometry    (X) Pulse oximetry    (X) Head of bed at 30 degrees    (X) Possible oxygen    Medications    (X) IV or oral antibiotics    * Milestone   Additional Notes   DATE: 12/19 day 11 ICU       Pertinent Updates:   at bedside this morning. RN present and case D/W her. States they are still trying to wean her off vecuronium.    Intubated and on vent. Sedated. Tmax 100.8. Overnight has issues with increased HR and low BP with giving Cardizem. This AM,BP was high and HR also high. Was given Cardizem po but not helping. Noted her feet are cold but pulses palpable. Will continue to monitor      Physical Exam:   General appearance: Intubated and sedated on vent. HEENT Normal cephalic, atraumatic without obvious deformity. Neck: Supple,Trachea midline without thyromegaly or adenopathy with full range of motion. Lungs: bilateral  Good air entry, no wheezing or crackles   Heart: Regular rhythm ,tachycardic. Abdomen: Soft, non-tender or non-distended without rigidity or guarding and positive bowel sounds all four quadrants. Tirado with good UOP   Extremities: No clubbing, cyanosis, or edema bilaterally.     Skin: Skin color, texture, turgor normal. No rashes or lesions. Neurologic: Sedated. Abnl/Pertinent Labs/Radiology/Diagnostic Studies:   Wbc 20.6, hgb 11.7, hct 33.4, platelets 874,       MD Consults/Assessments & Plans:   Assessment /Plan       Acute hypoxic respiratory failure   Intubated 12/16 for worsening resp failure. On Vent ,50%FIO2. Pulmonary following. Continue nebs ,abx and steroid   Wean off vencuronium. Continue propofol and fentanyl. Repeat labs in AM. Renal function better.  Procal and CRP trending down.       Severe sepsis secondary to COVID-19 pneumonia   CT chest did not show any PE, only patchy opacities bilaterally. Procal down from 0.6 to 0.2   CRP down from  62 to 21.7   -On iv cefepime and vanco from 12/16   -Not on pressors   -Received Tocilizumab 12/09 and on Decadron/barcitinib. ID following. Pulmonology following. Follow cultures.       Mood disorder:    On buspirone       Hypothyroidism:    On levothyroxine 125 mcg daily       PAF ? / Hypotension   Pt was on Cardizem 120 mg twice a  prior to admission, seems like an odd dose. Cardiology consulted. Saw pt and does not think she has atrial fibrillation Cardizem was held then started on IV fluids and echo done. Echo good. Hypotension resolved. Cardiology followed. Pt on lasix drip per cardiology recs. H/o HTN--was on diltiazem,stopped d/t hypotension. Started on midodrine by cardio and dose increased 12/15 secondary to hypotension. Midodrine held 12/18 sec to elevated BP/tachy. Lopressor prn and PO Cardizem scheduled started without much improvement and occasional hypotension. D/carmen and will start Cardizem drip.       DVT Prophylaxis: Therapeutic dose Lovenox.       ADULT TUBE FEEDING; Orogastric; Peptide Based High Protein tube feed.       Code Status: Full Code      PULM:  The patient is on the vent and sedated. She failed the vent wean in the morning    PCO2 55   Acute Hypoxic resp failure sec to Cardiogenic and non cardiogenic Pulmonary edema   Bilateral Pneumonia sec to COVID-19   Sec. Hypothryroidism   Anxiety   Afib rate controlled   VDRF           1. Wean off paralytics   2. RASS of 0 to -1 as tolerated   3. Tube feeds   4. CPT   5. Keep sats > 88%   6. CXR in am   7. SAT and SBt trial as tolerated   8. Abx   9.  F/u C&S   10. C/w present management           Pneumonia - Care Day 10 (12/18/2021) by Jorge Hinson RN       Review Status Review Entered   Completed 12/21/2021 09:58      Criteria Review      Care Day: 10 Care Date: 12/18/2021 Level of Care: ICU    Guideline Day 2    Clinical Status    (X) * No CO2 retention or acidosis    ( ) * No requirement for mechanical ventilation    12/21/2021 9:58 AM EST by Octapolyper      Intubated and on vent. Sedated    (X) * Hypotension absent    12/21/2021 9:58 AM EST by Octapolyper      100.8 (38.2) 22 131 173/97    ( ) * Afebrile or fever improved    12/21/2021 9:58 AM EST by Octapolyper      temp 100.8    ( ) * No hypoxia on room air or oxygenation improved    12/21/2021 9:58 AM EST by Lydia Dodd 87-89% on 40% fio2 per vent    ( ) * Mental status improved or at baseline    12/21/2021 9:58 AM EST by Octapolyper      sedated on vent    Activity    ( ) * Increased activity    Routes    ( ) Oral hydration    12/21/2021 9:58 AM EST by Octapolyper      IVF 50hr    ( ) Oral medications    12/21/2021 9:58 AM EST by Octapolyper      albuterol x4, atrovent x4, olumiant po x1, buspar po x1, tums po x1, maxipime iv x2, decadron iv x1, cardizem po x1, cardizem iv x1, lvoenox sc x2, pepcid iv x2, synthroid po, proamatine po x1, vanco iv x2, fetnanyl iv gtt, propofol, lopressor iv x1    (X) Usual diet    Interventions    (X) Incentive spirometry    (X) Pulse oximetry    (X) Head of bed at 30 degrees    (X) Possible oxygen    Medications    (X) IV or oral antibiotics    * Milestone   Additional Notes   DATE: 12/18 day 10 ICU       Pertinent Updates:   Intubated and on vent. Sedated. No reported events overnight. Still with fevers. Physical Exam:   General appearance: Intubated and sedated on vent. Fio2 40%. PEEP 12   HEENT Normal cephalic, atraumatic without obvious deformity. Neck: Supple,Trachea midline without thyromegaly or adenopathy with full range of motion. Lungs: bilateral  Good air entry, no wheezing or crackles   Heart: Regular rhythm ,tachycardic. Abdomen: Soft, non-tender or non-distended without rigidity or guarding and positive bowel sounds all four quadrants. Tirado with good UOP   Extremities: No clubbing,

## 2021-12-22 NOTE — PROGRESS NOTES
give sedation vacation and start spontaneous breathing trials. Pulmonary and cardiology notes reviewed. Labs reviewed. Continue current measures overall prognosis very guarded. Ten point ROS reviewed negative, unless as noted above    Objective: Intake/Output Summary (Last 24 hours) at 12/22/2021 0805  Last data filed at 12/22/2021 0247  Gross per 24 hour   Intake 1000.07 ml   Output 3275 ml   Net -2274.93 ml      Vitals:   Vitals:    12/22/21 0602   BP:    Pulse:    Resp: 22   Temp:    SpO2: 94%       Vent Information  $Ventilation: $Subsequent Day  Skin Assessment: Clean, dry, & intact  Suction Catheter Diameter: 14  Equipment Changed: HME  Vent Type: 980  Vent Mode: AC/PC  Vt Ordered: 0 mL  Pressure Ordered: 14  Rate Set: 22 bmp  FiO2 : 35 %  SpO2: 94 %  SpO2/FiO2 ratio: 268.57  PaO2/FiO2 ratio: 3  Sensitivity: 3  PEEP/CPAP: 8  I Time/ I Time %: 1.24 s  Humidification Source: HME  Nitric Oxide/Epoprostenol In Use?: No  Mask Type: Full face mask  Mask Size: Small  Bonnet size: Medium  Recent Labs     12/22/21  0600   PH 7.48*   KSV2CKG 41.0   PO2ART 154*   QVM9YHL 30.5*   CO2CT 31.8*   O2SAT 96.7   LABCARB 2.7             Physical Exam:   GEN  female, layingt in bed in no apparent distress. Appears given age. EYES Pupils are equally round. No scleral erythema, discharge, or conjunctivitis. HENT Mucous membranes are moist. Oral pharynx without exudates, no evidence of thrush. Endotracheal tube in situ  NECK Supple, no apparent thyromegaly or masses. RESP Clear to auscultation, no wheezes, rales or rhonchi. Symmetric chest movement while on room air. CARDIO/VASC S1/S2 auscultated. Regular rate without appreciable murmurs, rubs, or gallops. No JVD or carotid bruits. Peripheral pulses equal bilaterally and palpable. No peripheral edema. GI Abdomen is soft without significant tenderness, masses, or guarding. Bowel sounds are normoactive. Rectal exam deferred.     No costovertebral angle tenderness. Normal appearing external genitalia. Tirado catheter is not present. HEME/LYMPH No palpable cervical lymphadenopathy and no hepatosplenomegaly. No petechiae or ecchymoses. MSK No gross joint deformities. SKIN Normal coloration, warm, dry. NEURO sedated.   PSYCH sedated    Data:   CBC with Differential:    Lab Results   Component Value Date    WBC 25.3 12/22/2021    RBC 3.45 12/22/2021    HGB 10.7 12/22/2021    HCT 32.9 12/22/2021     12/22/2021    MCV 95.4 12/22/2021    MCH 31.0 12/22/2021    MCHC 32.5 12/22/2021    RDW 12.9 12/22/2021    SEGSPCT 76.0 12/21/2021    BANDSPCT 14 12/21/2021    LYMPHOPCT 1.0 12/21/2021    MONOPCT 7.0 12/21/2021    BASOPCT 0.2 12/19/2021    MONOSABS 1.6 12/21/2021    LYMPHSABS 0.2 12/21/2021    EOSABS 0.0 12/19/2021    BASOSABS 0.0 12/19/2021    DIFFTYPE MANUAL DIFFERENTIAL 12/21/2021       CMP:     Lab Results   Component Value Date     12/22/2021    K 4.8 12/22/2021     12/22/2021    CO2 31 12/22/2021    BUN 26 12/22/2021    CREATININE 0.5 12/22/2021    GFRAA >60 12/22/2021    AGRATIO 2.1 09/23/2020    LABGLOM >60 12/22/2021    GLUCOSE 124 12/22/2021    PROT 5.3 12/22/2021    LABALBU 3.1 12/22/2021    CALCIUM 8.6 12/22/2021    BILITOT 0.6 12/22/2021    ALKPHOS 120 12/22/2021     12/22/2021     12/22/2021       Troponin:  Lab Results   Component Value Date    TROPONINT <0.010 12/09/2021       U/A:    Lab Results   Component Value Date    COLORU YELLOW 12/18/2021    PROTEINU 100 12/18/2021    WBCUA 2 12/18/2021    RBCUA 17 12/18/2021    MUCUS RARE 12/18/2021    TRICHOMONAS NONE SEEN 12/18/2021    BACTERIA RARE 12/18/2021    CLARITYU CLEAR 12/18/2021    SPECGRAV 1.026 12/18/2021    LEUKOCYTESUR SMALL 12/18/2021    UROBILINOGEN NORMAL 12/18/2021    BILIRUBINUR NEGATIVE 12/18/2021    BLOODU SMALL 12/18/2021       Urine Culture:  No components found for: AMINATA    Radiology results:  XR CHEST PORTABLE   Final Result      XR CHEST PORTABLE Final Result   Slightly improved appearance of multifocal pneumonia. XR CHEST PORTABLE   Final Result   No interval change in multifocal pneumonia. XR CHEST PORTABLE   Final Result   1. ETT tip 4.3 cm above the ellie. 2. Persistent bilateral multi lobar airspace consolidations, most pronounced   in the lung bases. Differential includes multi lobar pneumonia and ARDS. XR CHEST PORTABLE   Final Result   Mild improvement in opacities in the mid upper lungs. The mid to lower lung   opacities are not changed from the immediate prior exam.  Right side improved   from 12/17/2021 but increased at the left base. XR CHEST PORTABLE   Final Result   Moderate diffuse bilateral lung airspace disease which has worsened on the   left but improved on the right. Lines and tubes are stable. XR CHEST PORTABLE   Final Result   1. Right greater than left bilateral airspace opacities without significant   change. 2. Right-sided PICC line with the tip overlying the distal SVC/right atrial   region. 3. Tip of the ET tube is 5.3 cm above the ellie. XR ABDOMEN FOR NG/OG/NE TUBE PLACEMENT   Final Result   Tip and side hole of the enteric tube are below the GE junction. XR CHEST PORTABLE   Final Result   1. Endotracheal tube 3.6 cm above the ellie. 2. Severe extensive patchy bilateral airspace opacities compatible with COVID   pneumonia. XR CHEST PORTABLE   Final Result   Increased bilateral airspace opacities suggesting increasing edema or   infection         XR CHEST PORTABLE   Final Result   Findings consistent with progressive/radiographically worse diffuse   pneumonitis/pneumonia compared to 12/12/2021. Pulmonary edema of cardiogenic   or noncardiogenic etiology may contribute to this appearance. No large areas   of pulmonary consolidation or detectable pleural effusions on the current   study.          XR CHEST PORTABLE   Final Result   Subtle bilateral pulmonary infiltrates, right greater than left, without   acute interval change. XR CHEST PORTABLE   Final Result   Patchy airspace opacities bilaterally, may be related to pulmonary edema   versus pneumonia, progressed. CTA PULMONARY W CONTRAST   Final Result   No evidence of pulmonary embolism. Extensive scattered areas of patchy and ground-glass opacities are noted   throughout the bilateral lungs. These are nonspecific but could indicate an   atypical/viral pneumonia. XR CHEST (2 VW)   Final Result   Patchy infiltrates within the lower lungs bilaterally, most compatible with   multifocal pneumonia. Consider both typical and atypical etiologies,   including viral pneumonia. There is mild pulmonary vascular congestion, and therefore correlate with any   clinical evidence of superimposed pulmonary edema.          XR CHEST PORTABLE    (Results Pending)       Medications:   Medications:    cefepime  2,000 mg IntraVENous Q8H    baricitinib  4 mg Oral Daily    dexamethasone  10 mg IntraVENous Q24H    vancomycin  1,250 mg IntraVENous Q12H    midodrine  5 mg Oral TID    enoxaparin  1 mg/kg SubCUTAneous BID    chlorhexidine  15 mL Mouth/Throat BID    famotidine (PEPCID) injection  20 mg IntraVENous BID    lidocaine   Topical Once    ipratropium  4 puff Inhalation Q4H    And    albuterol sulfate HFA  4 puff Inhalation Q4H    sodium chloride flush  5-40 mL IntraVENous 2 times per day    vitamin D  2,000 Units Oral Daily    busPIRone  5 mg Oral BID    calcium carbonate  500 mg Oral Daily    levothyroxine  125 mcg Oral Daily    montelukast  10 mg Oral Nightly      Infusions:    midazolam 6 mg/hr (12/22/21 0045)    dilTIAZem (CARDIZEM) 100 mg in dextrose 5% 100 mL ([facility]) 5 mg/hr (12/22/21 0244)    fentaNYL 150 mcg/hr (12/22/21 0554)    propofol 65 mcg/kg/min (12/22/21 0516)    sodium chloride Stopped (12/19/21 5347)    vecuronium (NORCURON) infusion Stopped (12/19/21 1644)    norepinephrine-sodium chloride Stopped (12/16/21 1929)     PRN Meds: sodium chloride flush, 5-40 mL, PRN  sodium chloride, 25 mL, PRN  sodium chloride, 1 spray, Q2H PRN  ondansetron, 4 mg, Q8H PRN   Or  ondansetron, 4 mg, Q6H PRN  polyethylene glycol, 17 g, Daily PRN  acetaminophen, 650 mg, Q6H PRN   Or  acetaminophen, 650 mg, Q6H PRN  guaiFENesin-dextromethorphan, 5 mL, Q4H PRN  traZODone, 50 mg, Nightly PRN          Electronically signed by Burke Lozano MD on 12/22/2021 at 8:05 AM

## 2021-12-22 NOTE — PROGRESS NOTES
Patient's temp was 101 rectally. Rectal Tylenol given and cooling blanket applied. Electronically signed: Smitha James. Ky Amaral RN, 12/22/21, [id number].

## 2021-12-22 NOTE — PROGRESS NOTES
Patient still on a peep of 8 and AC/PC ventilator settings. When sedation is lowered patient coughs and will not calm down. Sedation placed back on original settings. Respiratory to see patient this morning. This Nurse will discuss a possible change in Respiratory ventilator settings so they are more compatible with SAT/SBT trials. Electronically signed: Ngoc Ferrer. [facility], [id number], 12/22/21.

## 2021-12-22 NOTE — PROGRESS NOTES
12/22/21 0312   Vent Information   Vent Type 980   Vent Mode AC/PC   Pressure Ordered 14   Rate Set 22 bmp   FiO2  50 %   SpO2 97 %   SpO2/FiO2 ratio 194   Sensitivity 3   PEEP/CPAP 8   I Time/ I Time % 1.24 s   Humidification Source HME   Vent Patient Data   High Peep/I Pressure 14   Peak Inspiratory Pressure 26 cmH2O   Mean Airway Pressure 16 cmH20   Rate Measured 23 br/min   Vt Exhaled 593 mL   Minute Volume 12.8 Liters

## 2021-12-22 NOTE — PROGRESS NOTES
Pulmonary and Critical Care  Progress Note      VITALS:  /73   Pulse 83   Temp 99.7 °F (37.6 °C) (Rectal)   Resp 18   Ht 5' 5\" (1.651 m)   Wt 148 lb 9.4 oz (67.4 kg)   SpO2 91%   BMI 24.73 kg/m²     Subjective:   CHIEF COMPLAINT :SOB     HPI:                The patient is on the vent and sedated. She is responding to painful stimuli. She is on 40% fio2. Objective:   PHYSICAL EXAM:    LUNGS:Occasional basal crackles   Abd-soft, BS+,NT  Ext- no pedal edema  CVS-s1s2, no murmurs      DATA:    CBC:  Recent Labs     12/20/21  0650 12/21/21  0530 12/22/21  0530   WBC 17.6* 22.9* 25.3*   RBC 3.32* 3.37* 3.45*   HGB 10.4* 10.4* 10.7*   HCT 31.4* 31.8* 32.9*    174 201   MCV 94.6 94.4 95.4   MCH 31.3* 30.9 31.0   MCHC 33.1 32.7 32.5   RDW 12.3 12.6 12.9   SEGSPCT 75.0* 76.0*  --    BANDSPCT 14* 14*  --       BMP:  Recent Labs     12/20/21  0650 12/22/21  0530    139   K 4.3 4.8    101   CO2 30 31   BUN 23 26*   CREATININE 0.4* 0.5*   CALCIUM 8.2* 8.6   GLUCOSE 174* 124*      ABG:  Recent Labs     12/20/21  0600 12/21/21  0600 12/22/21  0600   PH 7.39 7.45 7.48*   PO2ART 59* 80 154*   WNN4TLN 50.0* 44.0 41.0   O2SAT 89.4* 94.1* 96.7     BNP  No results found for: BNP   D-Dimer:  Lab Results   Component Value Date    DDIMER 544 (H) 12/10/2021      Radiology:   Tubes and lines are unchanged.  Multifocal bilateral airspace disease overall   similar in appearance to the previous exam.     1.       Assessment/Plan     Patient Active Problem List    Diagnosis Date Noted    Acute respiratory failure with hypoxia ([address] 12/09/2021    Pneumonia due to COVID-19 virus 12/07/2021     Overview Note:     Tested positive on 12/4/2021      Anxiety 12/28/2020     Overview Note:     Patient has anxiety. She was taking Vistaril as needed but states he still feels anxious.   BuSpar 5 mg twice a day      Adjustment insomnia 09/23/2020     Overview Note:     Pt has insomnia because of anxiety and stress  Trial of trazodone 50 mg daily . Side effects explained.  Abnormal mammogram 09/24/2019     Overview Note:     9/24/19 : abnormal : repeat in 6 months. Pt was informed by radiologist   Mammogram in 3/20 was normal.  Patient is followed by gynecologist  1/7/21 : Margarita Reliance normal at [address] Tachycardia 06/26/2018    Essential hypertension 06/26/2018     Overview Note:     Patient has hypertension. Pt is on diltiazem  mg bid       Adjustment disorder with anxious mood 03/23/2018     Overview Note:     Pt wants to try vistaril prn.  Thyroid ca ([address] 12/15/2015     Overview Note:     Had total thyroidectomy by Dr Babs Camp. On synthroid. Managed by Dr Yanelis Stuart.  -he increased synthroid to 125 mcg daily except  1 1/2 tablet on Sundays  Pt has f/u appointment in 11/2021      Cough variant asthma      Overview Note:     On, albuterol and singulair. Pt is on Symbicort: feels well. Acute Hypoxic resp failure sec to Cardiogenic and non cardiogenic Pulmonary edema  Bilateral Pneumonia sec to COVID-19  Sec. Hypothryroidism  Anxiety  Afib rate controlled  VDRF       1. Tube feeds  2. Inhalers  3. CPT  4. Abx  5. F/u C&S  6. Keep sats > 88%  7. CXR in am  8. Daily SAT and SBT trial  9.  C/w present management    Electronically signed by Emiliana Vickers MD on 12/22/2021 at 2:42 PM

## 2021-12-23 NOTE — PROGRESS NOTES
Pulmonary and Critical Care  Progress Note      VITALS:  /67   Pulse 99   Temp 99.3 °F (37.4 °C) (Rectal)   Resp 22   Ht 5' 5\" (1.651 m)   Wt 154 lb 5.2 oz (70 kg)   SpO2 91%   BMI 25.68 kg/m²     Subjective:   CHIEF COMPLAINT :SOB   HPI:                The patient is on the vent and sedated. She is on 60% fio2. She is responding to painful stimuli. Objective:   PHYSICAL EXAM:    LUNGS:Occasional basal crackles   Abd-soft, BS+,NT  Ext- no pedal edema  CVS-s1s2, no murmurs      DATA:    CBC:  Recent Labs     12/21/21  0530 12/22/21  0530 12/23/21  0740   WBC 22.9* 25.3* 27.5*   RBC 3.37* 3.45* 3.61*   HGB 10.4* 10.7* 11.5*   HCT 31.8* 32.9* 34.6*    201 303   MCV 94.4 95.4 95.8   MCH 30.9 31.0 31.9*   MCHC 32.7 32.5 33.2   RDW 12.6 12.9 13.2   SEGSPCT 76.0* 86.0*  --    BANDSPCT 14* 4*  --       BMP:  Recent Labs     12/22/21  0530      K 4.8      CO2 31   BUN 26*   CREATININE 0.5*   CALCIUM 8.6   GLUCOSE 124*      ABG:  Recent Labs     12/21/21  0600 12/22/21  0600 12/23/21  0600   PH 7.45 7.48* 7.36   PO2ART 80 154* 69*   BSN8PFC 44.0 41.0 45.0   O2SAT 94.1* 96.7 90.5*     BNP  No results found for: BNP   D-Dimer:  Lab Results   Component Value Date    DDIMER 544 (H) 12/10/2021      Radiology:   Satisfactory position of support devices.       Stable severe COVID pneumonia     1. Assessment/Plan     Patient Active Problem List    Diagnosis Date Noted    Acute respiratory failure with hypoxia ([facility] 12/09/2021    Pneumonia due to COVID-19 virus 12/07/2021     Overview Note:     Tested positive on 12/4/2021      Anxiety 12/28/2020     Overview Note:     Patient has anxiety. She was taking Vistaril as needed but states he still feels anxious. BuSpar 5 mg twice a day      Adjustment insomnia 09/23/2020     Overview Note:     Pt has insomnia because of anxiety and stress  Trial of trazodone 50 mg daily . Side effects explained.       Abnormal mammogram 09/24/2019     Overview Note:     9/24/19 : abnormal : repeat in 6 months. Pt was informed by radiologist   Mammogram in 3/20 was normal.  Patient is followed by gynecologist  1/7/21 : Mahala Gloss normal at [address] Tachycardia 06/26/2018    Essential hypertension 06/26/2018     Overview Note:     Patient has hypertension. Pt is on diltiazem  mg bid       Adjustment disorder with anxious mood 03/23/2018     Overview Note:     Pt wants to try vistaril prn.  Thyroid ca ([address] 12/15/2015     Overview Note:     Had total thyroidectomy by Dr Cecil Yang. On synthroid. Managed by Dr Macrina Balbuena.  -he increased synthroid to 125 mcg daily except  1 1/2 tablet on Sundays  Pt has f/u appointment in 11/2021      Cough variant asthma      Overview Note:     On, albuterol and singulair. Pt is on Symbicort: feels well. Acute Hypoxic resp failure sec to Cardiogenic and non cardiogenic Pulmonary edema  Bilateral Pneumonia sec to COVID-19  Sec. Hypothryroidism  Anxiety  Afib rate controlled  VDRF       1. Pan cx  2. Abx  3. F/u C&S  4. Barcitinib  5. Decadron  6. Insulin  7. Inhalers  8. Keep sats > 88%  9. CPT  10. PT/OT  11. RASS of -1 to -2  12. SAT and SBT trial as tolerated  13.  C/w present management    Electronically signed by Berhane Corley MD on 12/23/2021 at 12:10 PM

## 2021-12-23 NOTE — PROGRESS NOTES
Lab notified of need to come to unit to draw labs. Unable to obtain through PICC. PICC will not return blood.

## 2021-12-23 NOTE — PROGRESS NOTES
12/23/21 1706   Vent Information   Vent Type 980   Vent Mode AC/PC   Pressure Ordered 15   Rate Set 22 bmp   FiO2  80 %   SpO2 95 %   SpO2/FiO2 ratio 118.75   Sensitivity 3   PEEP/CPAP 8   Humidification Source HME   Nitric Oxide/Epoprostenol In Use? No   Vent Patient Data   Peak Inspiratory Pressure 25 cmH2O   Mean Airway Pressure 16 cmH20   Rate Measured 23 br/min   Vt Exhaled 800 mL   Minute Volume 18.03 Liters   I:E Ratio 1:1.2   Breath Sounds   Right Upper Lobe Diminished   Right Middle Lobe Diminished   Right Lower Lobe Diminished   Left Upper Lobe Diminished   Left Lower Lobe Diminished   Additional Respiratory  Assessments   Position Semi-Holm's   Alarm Settings   High Pressure Alarm 40 cmH2O   Delay Alarm 20 sec(s)   Low Minute Volume Alarm 2.5 L/min   Apnea (secs) 20 secs   High Respiratory Rate 40 br/min   Low Exhaled Vt  250 mL   ETT (adult)   Placement Date/Time: 12/16/21 (c) 1446   Preoxygenation: Yes  Mask Ventilation: Mask ventilation not attempted (0)  Technique: Video laryngoscopy  Tube Size: 7.5 mm  Laryngoscope: (c) Mac  Blade Size: 3  Location: Oral  Insertion attempts: 1  Placemen. ..    Secured at 25 cm   Measured From Lips   ET Placement Right   Secured By Commercial tube wren   Site Condition Dry

## 2021-12-23 NOTE — CONSULTS
Blue lumen difficult to flush, pulsatile flushing performed, blood return noted. Complete sterile dressing change performed per facility protocol as current dressing had bloody gauze over insertion site, no bio-patch, no securing device. Labs drawn and sent.

## 2021-12-23 NOTE — PROGRESS NOTES
Pulmonology contacted to update orders for Propofol and Versed to have the same goal for RASS. Per Dr. Amber Faye, RASS needs to be 0 to -1 while weaning off vent.  Per , we will  wean FIO2 down to 50% first before vent weaning     Electronically signed by Oriana Proctor RN on 12/23/2021 at 11:38 AM

## 2021-12-23 NOTE — PLAN OF CARE
breakdown  Description: Absence of new skin breakdown  12/23/2021 0932 by Kerri Crenshaw RN  Outcome: Ongoing  12/22/2021 1938 by Bryan Slater RN  Outcome: Ongoing

## 2021-12-23 NOTE — PROGRESS NOTES
Patients decrease blood pressure noted with increase temp, and increase mews score. Message sent to hospitalist at this time.

## 2021-12-23 NOTE — PROGRESS NOTES
12/23/21 1338   Vent Information   Equipment Changed HME   Vent Type 980   Vent Mode AC/PC   Pressure Ordered 15   Rate Set 22 bmp   FiO2  90 %   SpO2 95 %   SpO2/FiO2 ratio 105.56   Sensitivity 3   PEEP/CPAP 8   Humidification Source HME   Vent Patient Data   Peak Inspiratory Pressure 25 cmH2O   Mean Airway Pressure 17 cmH20   Rate Measured 24 br/min   Vt Exhaled 717 mL   Minute Volume 16.2 Liters   I:E Ratio 1:1.2   Breath Sounds   Right Upper Lobe Diminished   Right Middle Lobe Diminished   Right Lower Lobe Diminished   Left Upper Lobe Diminished   Left Lower Lobe Diminished   Additional Respiratory  Assessments   Position Semi-Holm's   Alarm Settings   High Pressure Alarm 40 cmH2O   Delay Alarm 20 sec(s)   Low Minute Volume Alarm 2.5 L/min   Apnea (secs) 20 secs   High Respiratory Rate 40 br/min   Low Exhaled Vt  250 mL   Non-Surgical Airway 12/16/21 Endo Tracheal Tube   Placement Date/Time: 12/16/21 5853   Placed By: Licensed provider  Inserted by: Liz Claire CRNA   Insertion attempts: 1  Airway Device: Endo Tracheal Tube  Size: 7.5   Secured at 24 cm   Measured From Lips   Secured Location Right   Secured By Commercial tube wren   Site Condition Dry

## 2021-12-23 NOTE — PROGRESS NOTES
Hospitalist Progress Note      Name:  Valerie Osborn /Age/Sex: 1970  (45 y.o. female)   MRN & CSN:  [id number] & [id number] Admission Date/Time: 2021  7:44 PM   Location:  -A PCP: Ambar Fajardo MD         Hospital Day: 16    Assessment and Plan:   Valerie Osborn is a 45 y.o.  female  who presents with <principal problem not specified>    Severe sepsis secondary to COVID-19 pneumonia with acute hypoxic respiratory failure as endorgan defect  Acute on chronic COPD        On admission patient was on high flow oxygen,   placed on BiPAP, CT chest did not show any PE, patchy opacities bilaterally,   procal low, CRP elevated,   Pulmonology consult     S/p toci, monoclonal antibodies   Continue dexamethasone and PPI  Baricitinib     Was intubated   Vent management per pulmonology  Vancomycin and cefepime day 6  DC after 7-day course  She is on 90% FiO2 and barely saturating around 90.  -Continue current measures.        Mood disorder: Buspirone     Hypothyroidism: Levothyroxine     A. fib: Continue diltiazem infusion  -Continue Lovenox full therapeutic dose. Appreciate cardiology input        DVT prophlaxis:   *Treatment dose Lovenox    Diet Diet NPO  ADULT TUBE FEEDING; Nasogastric; Peptide Based High Protein; Continuous; 25; Yes; 25; Q 4 hours; 45; 30; Q 4 hours   DVT Prophylaxis [x] Lovenox, []  Heparin, [] SCDs, [] Ambulation   GI Prophylaxis [] PPI,  [] H2 Blocker,  [] Carafate,  [] Diet/Tube Feeds   Code Status Full Code   Disposition Patient requires continued admission due to respiratory failure   MDM [] Low, [] Moderate,[x]  High  Patient's risk as above due to respiratory failure     History of Present Illness:     Chief Complaint: <principal problem not specified>  Valerie Osborn is a 45 y.o.  female  who presents with respiratory failure secondary to Covid and sepsis. Subjective-patient examined at bedside. She is on multiple sedation. .  She is not on any vasopressors today.  She is xsdghlymb20% FiO2. She did not tolerate breathing trial yesterday. Given her high FiO2 today. She will not be a candidate for any weaning trials. Pulmonary and cardiology notes reviewed. Labs reviewed. Her x-ray chest from today has been reviewed as well. Continue current measures overall prognosis very guarded. Spoke with her mom and also left a voicemail for her  regarding her grim prognosis. Ten point ROS reviewed negative, unless as noted above    Objective: Intake/Output Summary (Last 24 hours) at 12/23/2021 0924  Last data filed at 12/23/2021 2810  Gross per 24 hour   Intake 2651.18 ml   Output 1775 ml   Net 876.18 ml      Vitals:   Vitals:    12/23/21 0810   BP: 104/67   Pulse: 99   Resp: 25   Temp: 100.6 °F (38.1 °C)   SpO2: 93%       Vent Information  $Ventilation: $Subsequent Day  Skin Assessment: Clean, dry, & intact  Suction Catheter Diameter: 14  Equipment Changed: HME  Vent Type: 980  Vent Mode: AC/PC  Vt Ordered: 0 mL  Pressure Ordered: 15  Rate Set: 22 bmp  FiO2 : 90 %  SpO2: 93 %  SpO2/FiO2 ratio: 103.33  PaO2/FiO2 ratio: 3  Sensitivity: 3  PEEP/CPAP: 8  I Time/ I Time %: 1.24 s  Humidification Source: HME  Nitric Oxide/Epoprostenol In Use?: No  Mask Type: Full face mask  Mask Size: Small  Bonnet size: Medium  Recent Labs     12/23/21  0600   PH 7.36   [id number] 45.0   PO2ART 69*   BE 0   CTZ5WBT 25.4*   CO2CT 26.8*   O2SAT 90.5*   LABCARB 2.9             Physical Exam:   GEN  female, layingt in bed in no apparent distress. Appears given age. EYES Pupils are equally round. No scleral erythema, discharge, or conjunctivitis. HENT Mucous membranes are moist. Oral pharynx without exudates, no evidence of thrush. Endotracheal tube in situ  NECK Supple, no apparent thyromegaly or masses. RESP Clear to auscultation, no wheezes, rales or rhonchi. Symmetric chest movement while on room air. CARDIO/VASC S1/S2 auscultated.  Regular rate without appreciable murmurs, rubs, or gallops. No JVD or carotid bruits. Peripheral pulses equal bilaterally and palpable. No peripheral edema. GI Abdomen is soft without significant tenderness, masses, or guarding. Bowel sounds are normoactive. Rectal exam deferred.  No costovertebral angle tenderness. Normal appearing external genitalia. Tirado catheter is not present. HEME/LYMPH No palpable cervical lymphadenopathy and no hepatosplenomegaly. No petechiae or ecchymoses. MSK No gross joint deformities. SKIN Normal coloration, warm, dry. NEURO sedated.   PSYCH sedated    Data:   CBC with Differential:    Lab Results   Component Value Date    WBC 27.5 12/23/2021    RBC 3.61 12/23/2021    HGB 11.5 12/23/2021    HCT 34.6 12/23/2021     12/23/2021    MCV 95.8 12/23/2021    MCH 31.9 12/23/2021    MCHC 33.2 12/23/2021    RDW 13.2 12/23/2021    SEGSPCT 86.0 12/22/2021    BANDSPCT 4 12/22/2021    LYMPHOPCT 5.0 12/22/2021    PROMYELOPCT 2 12/22/2021    MONOPCT 1.0 12/22/2021    MYELOPCT 2 12/22/2021    BASOPCT 0.2 12/19/2021    MONOSABS 0.3 12/22/2021    LYMPHSABS 1.3 12/22/2021    EOSABS 0.0 12/19/2021    BASOSABS 0.0 12/19/2021    DIFFTYPE MANUAL DIFFERENTIAL 12/22/2021       CMP:     Lab Results   Component Value Date     12/22/2021    K 4.8 12/22/2021     12/22/2021    CO2 31 12/22/2021    BUN 26 12/22/2021    CREATININE 0.5 12/22/2021    GFRAA >60 12/22/2021    AGRATIO 2.1 09/23/2020    LABGLOM >60 12/22/2021    GLUCOSE 124 12/22/2021    PROT 5.3 12/22/2021    LABALBU 3.1 12/22/2021    CALCIUM 8.6 12/22/2021    BILITOT 0.6 12/22/2021    ALKPHOS 120 12/22/2021     12/22/2021     12/22/2021       Troponin:  Lab Results   Component Value Date    TROPONINT <0.010 12/09/2021       U/A:    Lab Results   Component Value Date    COLORU YELLOW 12/18/2021    PROTEINU 100 12/18/2021    WBCUA 2 12/18/2021    RBCUA 17 12/18/2021    MUCUS RARE 12/18/2021    TRICHOMONAS NONE SEEN 12/18/2021    BACTERIA RARE 12/18/2021 CLARITYU CLEAR 12/18/2021    SPECGRAV 1.026 12/18/2021    LEUKOCYTESUR SMALL 12/18/2021    UROBILINOGEN NORMAL 12/18/2021    BILIRUBINUR NEGATIVE 12/18/2021    BLOODU SMALL 12/18/2021       Urine Culture:  No components found for: CURINE    Radiology results:  XR CHEST PORTABLE   Final Result   Satisfactory position of support devices. Stable severe COVID pneumonia         XR CHEST PORTABLE   Final Result   Multifocal opacities with mild increase in the upper lobes compared with   12/21/2021         XR CHEST PORTABLE   Final Result      XR CHEST PORTABLE   Final Result   Slightly improved appearance of multifocal pneumonia. XR CHEST PORTABLE   Final Result   No interval change in multifocal pneumonia. XR CHEST PORTABLE   Final Result   1. ETT tip 4.3 cm above the ellie. 2. Persistent bilateral multi lobar airspace consolidations, most pronounced   in the lung bases. Differential includes multi lobar pneumonia and ARDS. XR CHEST PORTABLE   Final Result   Mild improvement in opacities in the mid upper lungs. The mid to lower lung   opacities are not changed from the immediate prior exam.  Right side improved   from 12/17/2021 but increased at the left base. XR CHEST PORTABLE   Final Result   Moderate diffuse bilateral lung airspace disease which has worsened on the   left but improved on the right. Lines and tubes are stable. XR CHEST PORTABLE   Final Result   1. Right greater than left bilateral airspace opacities without significant   change. 2. Right-sided PICC line with the tip overlying the distal SVC/right atrial   region. 3. Tip of the ET tube is 5.3 cm above the ellie. XR ABDOMEN FOR NG/OG/NE TUBE PLACEMENT   Final Result   Tip and side hole of the enteric tube are below the GE junction. XR CHEST PORTABLE   Final Result   1. Endotracheal tube 3.6 cm above the ellie.    2. Severe extensive patchy bilateral airspace opacities compatible with COVID   pneumonia. XR CHEST PORTABLE   Final Result   Increased bilateral airspace opacities suggesting increasing edema or   infection         XR CHEST PORTABLE   Final Result   Findings consistent with progressive/radiographically worse diffuse   pneumonitis/pneumonia compared to 12/12/2021. Pulmonary edema of cardiogenic   or noncardiogenic etiology may contribute to this appearance. No large areas   of pulmonary consolidation or detectable pleural effusions on the current   study. XR CHEST PORTABLE   Final Result   Subtle bilateral pulmonary infiltrates, right greater than left, without   acute interval change. XR CHEST PORTABLE   Final Result   Patchy airspace opacities bilaterally, may be related to pulmonary edema   versus pneumonia, progressed. CTA PULMONARY W CONTRAST   Final Result   No evidence of pulmonary embolism. Extensive scattered areas of patchy and ground-glass opacities are noted   throughout the bilateral lungs. These are nonspecific but could indicate an   atypical/viral pneumonia. XR CHEST (2 VW)   Final Result   Patchy infiltrates within the lower lungs bilaterally, most compatible with   multifocal pneumonia. Consider both typical and atypical etiologies,   including viral pneumonia. There is mild pulmonary vascular congestion, and therefore correlate with any   clinical evidence of superimposed pulmonary edema.              Medications:   Medications:    vancomycin  1,250 mg IntraVENous Q12H    cefepime  2,000 mg IntraVENous Q8H    baricitinib  4 mg Oral Daily    dexamethasone  10 mg IntraVENous Q24H    midodrine  5 mg Oral TID    enoxaparin  1 mg/kg SubCUTAneous BID    chlorhexidine  15 mL Mouth/Throat BID    famotidine (PEPCID) injection  20 mg IntraVENous BID    lidocaine   Topical Once    ipratropium  4 puff Inhalation Q4H    And    albuterol sulfate HFA  4 puff Inhalation Q4H    sodium chloride flush  5-40 mL IntraVENous 2 times per day    vitamin D  2,000 Units Oral Daily    busPIRone  5 mg Oral BID    calcium carbonate  500 mg Oral Daily    levothyroxine  125 mcg Oral Daily    montelukast  10 mg Oral Nightly      Infusions:    norepinephrine 6 mcg/min (12/23/21 0657)    midazolam 10 mg/hr (12/23/21 0621)    dilTIAZem (CARDIZEM) 100 mg in dextrose 5% 100 mL ([facility]-[place]) 10 mg/hr (12/23/21 0424)    fentaNYL 200 mcg/hr (12/23/21 0536)    propofol 60 mcg/kg/min (12/23/21 0900)    sodium chloride Stopped (12/19/21 1337)    vecuronium (NORCURON) infusion Stopped (12/19/21 1644)     PRN Meds: sodium chloride flush, 5-40 mL, PRN  sodium chloride, 25 mL, PRN  sodium chloride, 1 spray, Q2H PRN  ondansetron, 4 mg, Q8H PRN   Or  ondansetron, 4 mg, Q6H PRN  polyethylene glycol, 17 g, Daily PRN  acetaminophen, 650 mg, Q6H PRN   Or  acetaminophen, 650 mg, Q6H PRN  guaiFENesin-dextromethorphan, 5 mL, Q4H PRN  traZODone, 50 mg, Nightly PRN          Electronically signed by Lotus Obrien MD on 12/23/2021 at 9:24 AM

## 2021-12-24 NOTE — PROGRESS NOTES
Comprehensive Nutrition Assessment    Type and Reason for Visit:  Reassess    Nutrition Recommendations/Plan:   · Continue EN as ordered    Nutrition Assessment:  pt remains sedated on vent, EN running @ 45 ml/hr per flowsheet, tolerating EN at goal rate, will follow at high nutrition risk    Malnutrition Assessment:  Malnutrition Status: At risk for malnutrition (Comment)    Context:  Acute Illness       Estimated Daily Nutrient Needs:  Energy (kcal):  1855; Weight Used for Energy Requirements:  Current     Protein (g):  85; Weight Used for Protein Requirements:  Ideal        Fluid (ml/day):  2000; Method Used for Fluid Requirements:  1 ml/kcal     Wounds:  None       Current Nutrition Therapies:    Current Tube Feeding (TF) Orders:  · Feeding Route: Nasogastric  · Formula: Peptide Based High Protein  · Schedule: Continuous (45 ml/hr per flowsheet)  · Current TF & Flush Orders Provides: 1080 kcal and 94 g protein    Anthropometric Measures:  · Height: 5' 5\" (165.1 cm)  · Current Body Weight: 148 lb 9.4 oz (67.4 kg) ((12/18))   · Admission Body Weight: 153 lb (69.4 kg)    · Usual Body Weight: 162 lb (73.5 kg)     · Ideal Body Weight: 125 lbs; % Ideal Body Weight 118.9 %   · BMI: 24.7   · BMI Categories: Normal Weight (BMI 18.5-24. 9)       Nutrition Diagnosis:   · Inadequate oral intake related to acute injury/trauma as evidenced by NPO or clear liquid status due to medical condition      Nutrition Interventions:   Food and/or Nutrient Delivery:  Continue Current Tube Feeding  Nutrition Education/Counseling:  No recommendation at this time   Coordination of Nutrition Care:  Continue to monitor while inpatient    Goals:  pt will meet greater than 75% of estimated nutrient needs via EN       Nutrition Monitoring and Evaluation:   Behavioral-Environmental Outcomes:  None Identified   Food/Nutrient Intake Outcomes:  Enteral Nutrition Intake/Tolerance  Physical Signs/Symptoms Outcomes:  Biochemical Data,GI Status,Weight,Skin,Hemodynamic Status,Fluid Status or Edema     Discharge Planning:     Too soon to determine     Electronically signed by Mark Almonte RD, URIEL on 92/72/39 at 12:54 PM EST    Contact: [phone]

## 2021-12-24 NOTE — PROGRESS NOTES
Hospitalist Progress Note      Name:  Lauren Rice /Age/Sex: 1970  (45 y.o. female)   MRN & CSN:  [id number] & [id number] Admission Date/Time: 2021  7:44 PM   Location:  -A PCP: Yared Thomas MD         Hospital Day: 17    Assessment and Plan:   Lauren Rice is a 45 y.o.  female  who presents with <principal problem not specified>    Severe sepsis secondary to COVID-19 pneumonia with acute hypoxic respiratory failure as endorgan defect  Acute on chronic COPD        On admission patient was on high flow oxygen,   placed on BiPAP, CT chest did not show any PE, patchy opacities bilaterally,   procal low, CRP elevated,   Pulmonology consult     S/p toci, monoclonal antibodies   Continue dexamethasone and PPI  Baricitinib     Was intubated   Vent management per pulmonology  Vancomycin and cefepime day 6  DC after 7-day course  She is on 70% FiO2, with PEEP of 8 and barely saturating around 90.  -Continue current measures.        Mood disorder: Buspirone     Hypothyroidism: Levothyroxine     A. fib: Continue diltiazem infusion  -Continue Lovenox full therapeutic dose. Appreciate cardiology input        DVT prophlaxis:   *Treatment dose Lovenox    Diet Diet NPO  ADULT TUBE FEEDING; Nasogastric; Peptide Based High Protein; Continuous; 25; Yes; 25; Q 4 hours; 45; 30; Q 4 hours   DVT Prophylaxis [x] Lovenox, []  Heparin, [] SCDs, [] Ambulation   GI Prophylaxis [] PPI,  [] H2 Blocker,  [] Carafate,  [] Diet/Tube Feeds   Code Status Full Code   Disposition Patient requires continued admission due to respiratory failure   MDM [] Low, [] Moderate,[x]  High  Patient's risk as above due to respiratory failure     History of Present Illness:     Chief Complaint: <principal problem not specified>  Lauren Rice is a 45 y.o.  female  who presents with respiratory failure secondary to Covid and sepsis. Subjective-patient examined at bedside. She is on multiple sedation. .  .   She is [id number]% FiO2. She did not tolerate breathing trial on 12/23/2021. Given her high FiO2 today. She will not be a candidate for any weaning trials. Pulmonary and cardiology notes reviewed. Labs reviewed. Her x-ray chest from today has been reviewed as well. Continue current measures overall prognosis very guarded. Spoke with her mom and also left a voicemail for her  regarding her grim prognosis. Try to speak with the family again today had to leave a voice message. Ten point ROS reviewed negative, unless as noted above    Objective: Intake/Output Summary (Last 24 hours) at 12/24/2021 0903  Last data filed at 12/24/2021 0400  Gross per 24 hour   Intake 1302 ml   Output 1750 ml   Net -448 ml      Vitals:   Vitals:    12/24/21 0847   BP:    Pulse: 90   Resp: 21   Temp:    SpO2: 92%       Vent Information  $Ventilation: $Subsequent Day  Skin Assessment: Clean, dry, & intact  Suction Catheter Diameter: 14  Equipment Changed: HME  Vent Type: 980  Vent Mode: AC/PC  Vt Ordered: 0 mL  Pressure Ordered: 15  Rate Set: 22 bmp  FiO2 : 70 %  SpO2: 92 %  SpO2/FiO2 ratio: 131.43  PaO2/FiO2 ratio: 3  Sensitivity: 3  PEEP/CPAP: 8  I Time/ I Time %: 1.24 s  Humidification Source: HME  Nitric Oxide/Epoprostenol In Use?: No  Mask Type: Full face mask  Mask Size: Small  Bonnet size: Medium  Recent Labs     12/23/21  0600   PH 7.36   [id number] 45.0   PO2ART 69*   BE 0   [id number] 25.4*   CO2CT 26.8*   O2SAT 90.5*   LABCARB 2.9             Physical Exam:   GEN  female, layingt in bed in no apparent distress. Appears given age. EYES Pupils are equally round. No scleral erythema, discharge, or conjunctivitis. HENT Mucous membranes are moist. Oral pharynx without exudates, no evidence of thrush. Endotracheal tube in situ  NECK Supple, no apparent thyromegaly or masses. RESP Clear to auscultation, no wheezes, rales or rhonchi. Symmetric chest movement while on room air. CARDIO/VASC S1/S2 auscultated.  Regular rate without appreciable murmurs, rubs, or gallops. No JVD or carotid bruits. Peripheral pulses equal bilaterally and palpable. No peripheral edema. GI Abdomen is soft without significant tenderness, masses, or guarding. Bowel sounds are normoactive. Rectal exam deferred.  No costovertebral angle tenderness. Normal appearing external genitalia. Tirado catheter is not present. HEME/LYMPH No palpable cervical lymphadenopathy and no hepatosplenomegaly. No petechiae or ecchymoses. MSK No gross joint deformities. SKIN Normal coloration, warm, dry. NEURO sedated.   PSYCH sedated    Data:   CBC with Differential:    Lab Results   Component Value Date    WBC 23.9 12/24/2021    RBC 3.13 12/24/2021    HGB 9.7 12/24/2021    HCT 30.2 12/24/2021     12/24/2021    MCV 96.5 12/24/2021    MCH 31.0 12/24/2021    MCHC 32.1 12/24/2021    RDW 13.2 12/24/2021    NRBC 1 12/23/2021    SEGSPCT 57.0 12/23/2021    BANDSPCT 36 12/23/2021    LYMPHOPCT 1.0 12/23/2021    PROMYELOPCT 2 12/22/2021    MONOPCT 1.0 12/23/2021    MYELOPCT 1 12/23/2021    BASOPCT 0.2 12/19/2021    MONOSABS 0.3 12/23/2021    LYMPHSABS 0.3 12/23/2021    EOSABS 0.0 12/19/2021    BASOSABS 0.0 12/19/2021    DIFFTYPE MANUAL DIFFERENTIAL 12/23/2021       CMP:     Lab Results   Component Value Date     12/22/2021    K 4.8 12/22/2021     12/22/2021    CO2 31 12/22/2021    BUN 26 12/22/2021    CREATININE 0.5 12/22/2021    GFRAA >60 12/22/2021    AGRATIO 2.1 09/23/2020    LABGLOM >60 12/22/2021    GLUCOSE 124 12/22/2021    PROT 5.3 12/22/2021    LABALBU 3.1 12/22/2021    CALCIUM 8.6 12/22/2021    BILITOT 0.6 12/22/2021    ALKPHOS 120 12/22/2021     12/22/2021     12/22/2021       Troponin:  Lab Results   Component Value Date    TROPONINT <0.010 12/09/2021       U/A:    Lab Results   Component Value Date    COLORU YELLOW 12/18/2021    PROTEINU 100 12/18/2021    WBCUA 2 12/18/2021    RBCUA 17 12/18/2021    MUCUS RARE 12/18/2021    TRICHOMONAS NONE patchy bilateral airspace opacities compatible with COVID   pneumonia. XR CHEST PORTABLE   Final Result   Increased bilateral airspace opacities suggesting increasing edema or   infection         XR CHEST PORTABLE   Final Result   Findings consistent with progressive/radiographically worse diffuse   pneumonitis/pneumonia compared to 12/12/2021. Pulmonary edema of cardiogenic   or noncardiogenic etiology may contribute to this appearance. No large areas   of pulmonary consolidation or detectable pleural effusions on the current   study. XR CHEST PORTABLE   Final Result   Subtle bilateral pulmonary infiltrates, right greater than left, without   acute interval change. XR CHEST PORTABLE   Final Result   Patchy airspace opacities bilaterally, may be related to pulmonary edema   versus pneumonia, progressed. CTA PULMONARY W CONTRAST   Final Result   No evidence of pulmonary embolism. Extensive scattered areas of patchy and ground-glass opacities are noted   throughout the bilateral lungs. These are nonspecific but could indicate an   atypical/viral pneumonia. XR CHEST (2 VW)   Final Result   Patchy infiltrates within the lower lungs bilaterally, most compatible with   multifocal pneumonia. Consider both typical and atypical etiologies,   including viral pneumonia. There is mild pulmonary vascular congestion, and therefore correlate with any   clinical evidence of superimposed pulmonary edema.              Medications:   Medications:    cefepime  2,000 mg IntraVENous Q8H    baricitinib  4 mg Oral Daily    dexamethasone  10 mg IntraVENous Q24H    midodrine  5 mg Oral TID    enoxaparin  1 mg/kg SubCUTAneous BID    chlorhexidine  15 mL Mouth/Throat BID    famotidine (PEPCID) injection  20 mg IntraVENous BID    lidocaine   Topical Once    ipratropium  4 puff Inhalation Q4H    And    albuterol sulfate HFA  4 puff Inhalation Q4H    sodium chloride flush  5-40 mL IntraVENous 2 times per day    vitamin D  2,000 Units Oral Daily    busPIRone  5 mg Oral BID    calcium carbonate  500 mg Oral Daily    levothyroxine  125 mcg Oral Daily    montelukast  10 mg Oral Nightly      Infusions:    norepinephrine Stopped (12/23/21 1321)    midazolam 7 mg/hr (12/23/21 2125)    dilTIAZem (CARDIZEM) 100 mg in dextrose 5% 100 mL (ADD-[place]) 10 mg/hr (12/24/21 0731)    fentaNYL 200 mcg/hr (12/24/21 0716)    propofol 50 mcg/kg/min (12/24/21 0407)    sodium chloride Stopped (12/19/21 1337)    vecuronium (NORCURON) infusion Stopped (12/19/21 1644)     PRN Meds: sodium chloride flush, 5-40 mL, PRN  sodium chloride, 25 mL, PRN  sodium chloride, 1 spray, Q2H PRN  ondansetron, 4 mg, Q8H PRN   Or  ondansetron, 4 mg, Q6H PRN  polyethylene glycol, 17 g, Daily PRN  acetaminophen, 650 mg, Q6H PRN   Or  acetaminophen, 650 mg, Q6H PRN  guaiFENesin-dextromethorphan, 5 mL, Q4H PRN  traZODone, 50 mg, Nightly PRN          Electronically signed by Santi Pina MD on 12/24/2021 at 9:03 AM

## 2021-12-24 NOTE — PROGRESS NOTES
Pulmonary and Critical Care  Progress Note      VITALS:  /70   Pulse 83   Temp 98.4 °F (36.9 °C)   Resp 18   Ht 5' 5\" (1.651 m)   Wt 158 lb 4.6 oz (71.8 kg)   SpO2 91%   BMI 26.34 kg/m²     Subjective:   CHIEF COMPLAINT :SOB     HPI:                The patient is on the vent and sedated. She has minimal response to painful stimuli. Objective:   PHYSICAL EXAM:    LUNGS:Occasional basal crackles   Abd-soft, BS+,NT  Ext- no pedal edema  CVS-s1s2, no murmurs      DATA:    CBC:  Recent Labs     12/22/21  0530 12/23/21  0740 12/24/21  0445   WBC 25.3* 27.5* 23.9*   RBC 3.45* 3.61* 3.13*   HGB 10.7* 11.5* 9.7*   HCT 32.9* 34.6* 30.2*    303 242   MCV 95.4 95.8 96.5   MCH 31.0 31.9* 31.0   MCHC 32.5 33.2 32.1   RDW 12.9 13.2 13.2   NRBC  --  1  --    SEGSPCT 86.0* 57.0 70.0*   BANDSPCT 4* 36* 27*      BMP:  Recent Labs     12/22/21  0530      K 4.8      CO2 31   BUN 26*   CREATININE 0.5*   CALCIUM 8.6   GLUCOSE 124*      ABG:  Recent Labs     12/22/21  0600 12/23/21  0600   PH 7.48* 7.36   PO2ART 154* 69*   QDM0QKI 41.0 45.0   O2SAT 96.7 90.5*     BNP  No results found for: BNP   D-Dimer:  Lab Results   Component Value Date    DDIMER 544 (H) 12/10/2021      1. Radiology: None      Assessment/Plan     Patient Active Problem List    Diagnosis Date Noted    Acute respiratory failure with hypoxia ([facility] [address] 12/09/2021    Pneumonia due to COVID-19 virus 12/07/2021     Overview Note:     Tested positive on 12/4/2021      Anxiety 12/28/2020     Overview Note:     Patient has anxiety. She was taking Vistaril as needed but states he still feels anxious. BuSpar 5 mg twice a day      Adjustment insomnia 09/23/2020     Overview Note:     Pt has insomnia because of anxiety and stress  Trial of trazodone 50 mg daily . Side effects explained.  Abnormal mammogram 09/24/2019     Overview Note:     9/24/19 : abnormal : repeat in 6 months.  Pt was informed by radiologist   Mammogram in 3/20 was normal. Patient is followed by gynecologist  1/7/21 : Floyce Sluder normal at [address] Tachycardia 06/26/2018    Essential hypertension 06/26/2018     Overview Note:     Patient has hypertension. Pt is on diltiazem  mg bid       Adjustment disorder with anxious mood 03/23/2018     Overview Note:     Pt wants to try vistaril prn.  Thyroid ca ([address] 12/15/2015     Overview Note:     Had total thyroidectomy by Dr Rosalina Ruby. On synthroid. Managed by Dr Wilber Thomas.  -he increased synthroid to 125 mcg daily except  1 1/2 tablet on Sundays  Pt has f/u appointment in 11/2021      Cough variant asthma      Overview Note:     On, albuterol and singulair. Pt is on Symbicort: feels well. Acute Hypoxic resp failure sec to Cardiogenic and non cardiogenic Pulmonary edema  Bilateral Pneumonia sec to COVID-19  Sec. Hypothryroidism  Anxiety  Afib rate controlled  VDRF       1. ABX  2. F/U c&s  3. Keep sats > 88%  4. Tube feeds  5. Decadron  6. Insulin  7. Inhalers  8. Barcitinib  9. Daily SAT and SBT trials  10. PT/OT  11. CXR in am  15.  C.w present management    Electronically signed by Anastasia Johns MD on 12/24/2021 at 11:43 AM

## 2021-12-25 NOTE — PLAN OF CARE

## 2021-12-25 NOTE — PROGRESS NOTES
Hospitalist Progress Note      Name:  Catalina Cruz /Age/Sex: 1970  (45 y.o. female)   MRN & CSN:  [id number] & [id number] Admission Date/Time: 2021  7:44 PM   Location:  -A PCP: Lisa Calvin MD         Hospital Day: 18    Assessment and Plan:   Catalina Cruz is a 45 y.o.  female  who presents with <principal problem not specified>    Severe sepsis secondary to COVID-19 pneumonia with acute hypoxic respiratory failure as endorgan defect  Acute on chronic COPD        On admission patient was on high flow oxygen,   placed on BiPAP, CT chest did not show any PE, patchy opacities bilaterally,   procal low, CRP elevated,   Pulmonology consult  Repeat x-ray  S/p toci, monoclonal antibodies   Continue dexamethasone and PPI  Baricitinib     Was intubated   Vent management per pulmonology  Vancomycin and cefepime day 6  DC after 7-day course  She is on 70% FiO2, with PEEP of 8 and barely saturating around 90.  -Continue current measures.        Mood disorder: Buspirone     Hypothyroidism: Levothyroxine     A. fib: Continue diltiazem infusion  -Continue Lovenox full therapeutic dose. Appreciate cardiology input        DVT prophlaxis:   *Treatment dose Lovenox    Diet Diet NPO  ADULT TUBE FEEDING; Nasogastric; Peptide Based High Protein; Continuous; 25; Yes; 25; Q 4 hours; 45; 30; Q 4 hours   DVT Prophylaxis [x] Lovenox, []  Heparin, [] SCDs, [] Ambulation   GI Prophylaxis [] PPI,  [] H2 Blocker,  [] Carafate,  [] Diet/Tube Feeds   Code Status Full Code   Disposition Patient requires continued admission due to respiratory failure   MDM [] Low, [] Moderate,[x]  High  Patient's risk as above due to respiratory failure     History of Present Illness:     Chief Complaint: <principal problem not specified>  Catalina Cruz is a 45 y.o.  female  who presents with respiratory failure secondary to Covid and sepsis. Subjective-patient examined at bedside. She is on multiple sedation. .  .   She is ywkuqyzse99% FiO2. She did not tolerate breathing trial on 12/23/2021. Given her high FiO2 today. She will not be a candidate for any weaning trials. Pulmonary and cardiology notes reviewed. Labs reviewed. Her x-ray chest from today has been reviewed as well. Continue current measures overall prognosis very guarded. Spoke with her mom and also left a voicemail for her  regarding her grim prognosis. Discussed in detail and poor prognosis intimated to her  Britney Cullen. Ten point ROS reviewed negative, unless as noted above    Objective: Intake/Output Summary (Last 24 hours) at 12/25/2021 0859  Last data filed at 12/25/2021 0813  Gross per 24 hour   Intake 3662.74 ml   Output 2400 ml   Net 1262.74 ml      Vitals:   Vitals:    12/25/21 0809   BP:    Pulse:    Resp: 24   Temp:    SpO2: 94%       Vent Information  $Ventilation: $Subsequent Day  Skin Assessment: Clean, dry, & intact  Suction Catheter Diameter: 14  Equipment Changed: HME  Vent Type: 980  Vent Mode: AC/PC  Vt Ordered: 0 mL  Pressure Ordered: 15  Rate Set: 22 bmp  FiO2 : 70 %  SpO2: 94 %  SpO2/FiO2 ratio: 132.86  PaO2/FiO2 ratio: 3  Sensitivity: 3  PEEP/CPAP: 8  I Time/ I Time %: 1.24 s  Humidification Source: HME  Nitric Oxide/Epoprostenol In Use?: No  Mask Type: Full face mask  Mask Size: Small  Bonnet size: Medium  Recent Labs     12/23/21  0600   PH 7.36   [id number] 45.0   PO2ART 69*   BE 0   [id number] 25.4*   CO2CT 26.8*   O2SAT 90.5*   LABCARB 2.9             Physical Exam:   GEN  female, layingt in bed in no apparent distress. Appears given age. EYES Pupils are equally round. No scleral erythema, discharge, or conjunctivitis. HENT Mucous membranes are moist. Oral pharynx without exudates, no evidence of thrush. Endotracheal tube in situ  NECK Supple, no apparent thyromegaly or masses. RESP Clear to auscultation, no wheezes, rales or rhonchi. Symmetric chest movement while on room air. CARDIO/VASC S1/S2 auscultated. Regular rate without appreciable murmurs, rubs, or gallops. No JVD or carotid bruits. Peripheral pulses equal bilaterally and palpable. No peripheral edema. GI Abdomen is soft without significant tenderness, masses, or guarding. Bowel sounds are normoactive. Rectal exam deferred.  No costovertebral angle tenderness. Normal appearing external genitalia. Tirado catheter is not present. HEME/LYMPH No palpable cervical lymphadenopathy and no hepatosplenomegaly. No petechiae or ecchymoses. MSK No gross joint deformities. SKIN Normal coloration, warm, dry. NEURO sedated.   PSYCH sedated    Data:   CBC with Differential:    Lab Results   Component Value Date    WBC 24.6 12/25/2021    RBC 3.20 12/25/2021    HGB 10.0 12/25/2021    HCT 31.0 12/25/2021     12/25/2021    MCV 96.9 12/25/2021    MCH 31.3 12/25/2021    MCHC 32.3 12/25/2021    RDW 13.2 12/25/2021    NRBC 1 12/23/2021    SEGSPCT 70.0 12/24/2021    BANDSPCT 27 12/24/2021    LYMPHOPCT 1.0 12/24/2021    PROMYELOPCT 2 12/22/2021    MONOPCT 1.0 12/24/2021    MYELOPCT 1 12/23/2021    BASOPCT 0.2 12/19/2021    MONOSABS 0.2 12/24/2021    LYMPHSABS 0.2 12/24/2021    EOSABS 0.0 12/19/2021    BASOSABS 0.0 12/19/2021    DIFFTYPE MANUAL DIFFERENTIAL 12/24/2021       CMP:     Lab Results   Component Value Date     12/25/2021    K 4.7 12/25/2021    CL 98 12/25/2021    CO2 29 12/25/2021    BUN 23 12/25/2021    CREATININE 0.3 12/25/2021    GFRAA >60 12/25/2021    AGRATIO 2.1 09/23/2020    LABGLOM >60 12/25/2021    GLUCOSE 150 12/25/2021    PROT 5.3 12/25/2021    LABALBU 3.0 12/25/2021    CALCIUM 8.6 12/25/2021    BILITOT 0.5 12/25/2021    ALKPHOS 147 12/25/2021    AST 33 12/25/2021     12/25/2021       Troponin:  Lab Results   Component Value Date    TROPONINT <0.010 12/09/2021       U/A:    Lab Results   Component Value Date    COLORU YELLOW 12/18/2021    PROTEINU 100 12/18/2021    WBCUA 2 12/18/2021    RBCUA 17 12/18/2021    MUCUS RARE 12/18/2021 TRICHOMONAS NONE SEEN 12/18/2021    BACTERIA RARE 12/18/2021    CLARITYU CLEAR 12/18/2021    SPECGRAV 1.026 12/18/2021    LEUKOCYTESUR SMALL 12/18/2021    UROBILINOGEN NORMAL 12/18/2021    BILIRUBINUR NEGATIVE 12/18/2021    BLOODU SMALL 12/18/2021       Urine Culture:  No components found for: CURINE    Radiology results:  XR CHEST PORTABLE   Final Result   Persistent bilateral airspace disease compatible with COVID-19 pneumonia. XR CHEST PORTABLE   Final Result   Satisfactory position of support devices. Stable severe COVID pneumonia         XR CHEST PORTABLE   Final Result   Multifocal opacities with mild increase in the upper lobes compared with   12/21/2021         XR CHEST PORTABLE   Final Result      XR CHEST PORTABLE   Final Result   Slightly improved appearance of multifocal pneumonia. XR CHEST PORTABLE   Final Result   No interval change in multifocal pneumonia. XR CHEST PORTABLE   Final Result   1. ETT tip 4.3 cm above the ellie. 2. Persistent bilateral multi lobar airspace consolidations, most pronounced   in the lung bases. Differential includes multi lobar pneumonia and ARDS. XR CHEST PORTABLE   Final Result   Mild improvement in opacities in the mid upper lungs. The mid to lower lung   opacities are not changed from the immediate prior exam.  Right side improved   from 12/17/2021 but increased at the left base. XR CHEST PORTABLE   Final Result   Moderate diffuse bilateral lung airspace disease which has worsened on the   left but improved on the right. Lines and tubes are stable. XR CHEST PORTABLE   Final Result   1. Right greater than left bilateral airspace opacities without significant   change. 2. Right-sided PICC line with the tip overlying the distal SVC/right atrial   region. 3. Tip of the ET tube is 5.3 cm above the ellie.          XR ABDOMEN FOR NG/OG/NE TUBE PLACEMENT   Final Result   Tip and side hole of the enteric tube are below the GE junction. XR CHEST PORTABLE   Final Result   1. Endotracheal tube 3.6 cm above the ellie. 2. Severe extensive patchy bilateral airspace opacities compatible with COVID   pneumonia. XR CHEST PORTABLE   Final Result   Increased bilateral airspace opacities suggesting increasing edema or   infection         XR CHEST PORTABLE   Final Result   Findings consistent with progressive/radiographically worse diffuse   pneumonitis/pneumonia compared to 12/12/2021. Pulmonary edema of cardiogenic   or noncardiogenic etiology may contribute to this appearance. No large areas   of pulmonary consolidation or detectable pleural effusions on the current   study. XR CHEST PORTABLE   Final Result   Subtle bilateral pulmonary infiltrates, right greater than left, without   acute interval change. XR CHEST PORTABLE   Final Result   Patchy airspace opacities bilaterally, may be related to pulmonary edema   versus pneumonia, progressed. CTA PULMONARY W CONTRAST   Final Result   No evidence of pulmonary embolism. Extensive scattered areas of patchy and ground-glass opacities are noted   throughout the bilateral lungs. These are nonspecific but could indicate an   atypical/viral pneumonia. XR CHEST (2 VW)   Final Result   Patchy infiltrates within the lower lungs bilaterally, most compatible with   multifocal pneumonia. Consider both typical and atypical etiologies,   including viral pneumonia. There is mild pulmonary vascular congestion, and therefore correlate with any   clinical evidence of superimposed pulmonary edema.              Medications:   Medications:    cefepime  2,000 mg IntraVENous Q8H    baricitinib  4 mg Oral Daily    dexamethasone  10 mg IntraVENous Q24H    midodrine  5 mg Oral TID    enoxaparin  1 mg/kg SubCUTAneous BID    chlorhexidine  15 mL Mouth/Throat BID    famotidine (PEPCID) injection  20 mg IntraVENous BID    lidocaine   Topical Once  ipratropium  4 puff Inhalation Q4H    And    albuterol sulfate HFA  4 puff Inhalation Q4H    sodium chloride flush  5-40 mL IntraVENous 2 times per day    vitamin D  2,000 Units Oral Daily    busPIRone  5 mg Oral BID    calcium carbonate  500 mg Oral Daily    levothyroxine  125 mcg Oral Daily    montelukast  10 mg Oral Nightly      Infusions:    norepinephrine Stopped (12/23/21 1321)    midazolam 8 mg/hr (12/25/21 0804)    dilTIAZem (CARDIZEM) 100 mg in dextrose 5% 100 mL (ADD-[place]) 5 mg/hr (12/24/21 2210)    fentaNYL 175 mcg/hr (12/25/21 0809)    propofol 70 mcg/kg/min (12/25/21 0825)    sodium chloride Stopped (12/19/21 1337)    vecuronium (NORCURON) infusion Stopped (12/19/21 1644)     PRN Meds: sodium chloride flush, 5-40 mL, PRN  sodium chloride, 25 mL, PRN  sodium chloride, 1 spray, Q2H PRN  ondansetron, 4 mg, Q8H PRN   Or  ondansetron, 4 mg, Q6H PRN  polyethylene glycol, 17 g, Daily PRN  acetaminophen, 650 mg, Q6H PRN   Or  acetaminophen, 650 mg, Q6H PRN  guaiFENesin-dextromethorphan, 5 mL, Q4H PRN  traZODone, 50 mg, Nightly PRN          Electronically signed by Azra Pino MD on 12/25/2021 at 8:59 AM

## 2021-12-25 NOTE — PROGRESS NOTES
Pt coughing frequently and \"fighting vent\", message sent to Dr. Todd Oviedo to see if rass can be updated and additional sedative ordered.

## 2021-12-25 NOTE — PROGRESS NOTES
Pt's , Laurian Libman, called for an update on her. Updated him on Pt's current condition and answered all of his questions.

## 2021-12-25 NOTE — PROGRESS NOTES
12/25/21 0805   Vent Information   $Ventilation $Subsequent Day   Vent Type 980   Vent Mode AC/PC   Pressure Ordered 15   Rate Set 22 bmp   FiO2  70 %   SpO2 93 %   SpO2/FiO2 ratio 132.86   Sensitivity 3   PEEP/CPAP 8   I Time/ I Time % 1.24 s   Humidification Source HME   Vent Patient Data   High Peep/I Pressure 15   Peak Inspiratory Pressure 24 cmH2O   Mean Airway Pressure 16 cmH20   Rate Measured 24 br/min   Vt Exhaled 444 mL   Minute Volume 8.87 Liters   Cough/Sputum   Sputum How Obtained Endotracheal;Suctioned   $Obtained Sample $Induced Sputum   Cough Productive   Frequency Occasional   Sputum Amount Moderate   Sputum Color Tan;Cloudy   Tenacity Thick   Spontaneous Breathing Trial (SBT) RT Doc   Pulse 84   Additional Respiratory  Assessments   Resp 24   Position Semi-Holm's   Alarm Settings   High Pressure Alarm 40 cmH2O   Delay Alarm 20 sec(s)   Low Minute Volume Alarm 2.5 L/min   Apnea (secs) 20 secs   High Respiratory Rate 40 br/min   Low Exhaled Vt  250 mL   ETT (adult)   Placement Date/Time: 12/16/21 (c) 1446   Preoxygenation: Yes  Mask Ventilation: Mask ventilation not attempted (0)  Technique: Video laryngoscopy  Tube Size: 7.5 mm  Laryngoscope: (c) Mac  Blade Size: 3  Location: Oral  Insertion attempts: 1  Placemen. ..    Secured at 24 cm   Measured From [address] [place] [address],[address] By Commercial tube wren   Site Condition Dry   Cuff Pressure   (mov)   Non-Surgical Airway 12/16/21 Endo Tracheal Tube   Placement Date/Time: 12/16/21 7577   Placed By: Licensed provider  Inserted by: [facility] CRNA   Insertion attempts: 1  Airway Device: Endo Tracheal Tube  Size: 7.5   Secured at 24 cm   Measured From [address] [facility] By Commercial tube wren   Site Condition Dry   Cuff Pressure   (mov)

## 2021-12-25 NOTE — PROGRESS NOTES
Overview Note:     9/24/19 : abnormal : repeat in 6 months. Pt was informed by radiologist   Mammogram in 3/20 was normal.  Patient is followed by gynecologist  1/7/21 : Bruno Scherer normal at [address] Tachycardia 06/26/2018    Essential hypertension 06/26/2018     Overview Note:     Patient has hypertension. Pt is on diltiazem  mg bid       Adjustment disorder with anxious mood 03/23/2018     Overview Note:     Pt wants to try vistaril prn.  Thyroid ca ([address] 12/15/2015     Overview Note:     Had total thyroidectomy by Dr Anton Oconnor. On synthroid. Managed by Dr Carleen Pastor.  -he increased synthroid to 125 mcg daily except  1 1/2 tablet on Sundays  Pt has f/u appointment in 11/2021      Cough variant asthma      Overview Note:     On, albuterol and singulair. Pt is on Symbicort: feels well. Acute Hypoxic resp failure sec to Cardiogenic and non cardiogenic Pulmonary edema  Bilateral Pneumonia sec to COVID-19  Sec. Hypothryroidism  Anxiety  Afib rate controlled  VDRF       1. Barcitinib  2. Decadron  3. Insulin  4. Inhalers  5. Keep sats > 88%  6. Daily SAT and SBT trial  7. Tube feeds  8. PT/OT  9. CXR in am  10.  C.w present management  Electronically signed by Arthuro Boeck, MD on 12/25/2021 at 1:02 PM

## 2021-12-26 NOTE — PROGRESS NOTES
Hospitalist Progress Note      Name:  Macie Salter /Age/Sex: 1970  (45 y.o. female)   MRN & CSN:  [id number] & [id number] Admission Date/Time: 2021  7:44 PM   Location:  -A PCP: Mansi Diggs MD         Hospital Day:     Assessment and Plan:   Macie Salter is a 45 y.o.  female  who presents with <principal problem not specified>    Severe sepsis secondary to COVID-19 pneumonia with acute hypoxic respiratory failure as endorgan defect  Acute on chronic COPD        On admission patient was on high flow oxygen,   placed on BiPAP, CT chest did not show any PE, patchy opacities bilaterally,   procal low, CRP elevated,   Pulmonology consult  Repeat x-ray  S/p toci, monoclonal antibodies   Continue dexamethasone and PPI  Baricitinib     Was intubated   Vent management per pulmonology  Vancomycin and cefepime day 6  DC after 7-day course  She is on 65% FiO2, with PEEP of 8 and  saturating around 96%. -Continue current measures.        Mood disorder: Buspirone     Hypothyroidism: Levothyroxine     A. fib: Continue diltiazem infusion  -Continue Lovenox full therapeutic dose. Appreciate cardiology input        DVT prophlaxis:   *Treatment dose Lovenox    Diet Diet NPO  ADULT TUBE FEEDING; Nasogastric; Peptide Based High Protein; Continuous; 25; Yes; 25; Q 4 hours; 45; 30; Q 4 hours   DVT Prophylaxis [x] Lovenox, []  Heparin, [] SCDs, [] Ambulation   GI Prophylaxis [] PPI,  [] H2 Blocker,  [] Carafate,  [] Diet/Tube Feeds   Code Status Full Code   Disposition Patient requires continued admission due to respiratory failure   MDM [] Low, [] Moderate,[x]  High  Patient's risk as above due to respiratory failure     History of Present Illness:     Chief Complaint: <principal problem not specified>  Macie Salter is a 45 y.o.  female  who presents with respiratory failure secondary to Covid and sepsis. Subjective-patient examined at bedside. She is on multiple sedation. .  .   She is requiring 65%% FiO2. She did not tolerate breathing trial on 12/23/2021. Given her high FiO2 today. She will not be a candidate for any weaning trials. Pulmonary and cardiology notes reviewed. Labs reviewed. Her x-ray chest from today has been reviewed as well. Continue current measures overall prognosis very guarded. Spoke with her mom and also left a voicemail for her  regarding her grim prognosis. Discussed in detail and poor prognosis intimated to her  Livia Nuñez. Ten point ROS reviewed negative, unless as noted above    Objective: Intake/Output Summary (Last 24 hours) at 12/26/2021 0851  Last data filed at 12/26/2021 0733  Gross per 24 hour   Intake 1928.91 ml   Output 5400 ml   Net -3471.09 ml      Vitals:   Vitals:    12/26/21 0825   BP:    Pulse:    Resp: 17   Temp:    SpO2: 97%       Vent Information  $Ventilation: $Subsequent Day  Skin Assessment: Clean, dry, & intact  Suction Catheter Diameter: 14  Equipment Changed: HME  Vent Type: 980  Vent Mode: AC/PC  Vt Ordered: 0 mL  Pressure Ordered: 15  Rate Set: 22 bmp  FiO2 : 65 %  SpO2: 97 %  SpO2/FiO2 ratio: 149.23  PaO2/FiO2 ratio: 3  Sensitivity: 3  PEEP/CPAP: 8  I Time/ I Time %: 1.24 s  Humidification Source: HME  Nitric Oxide/Epoprostenol In Use?: No  Mask Type: Full face mask  Mask Size: Small  Bonnet size: Medium  Recent Labs     12/26/21  0600   PH 7.38   WHT8OPT 50.0*   PO2ART 86   XSN3KPP 29.6*   CO2CT 31.1*   O2SAT 93.9*   LABCARB 2.5             Physical Exam:   GEN  female, Allyson Isaura in bed in no apparent distress. Appears given age. EYES Pupils are equally round. No scleral erythema, discharge, or conjunctivitis. HENT Mucous membranes are moist. Oral pharynx without exudates, no evidence of thrush. Endotracheal tube in situ  NECK Supple, no apparent thyromegaly or masses. RESP Clear to auscultation, no wheezes, rales or rhonchi. Symmetric chest movement while on room air. CARDIO/VASC S1/S2 auscultated.  Regular rate without appreciable murmurs, rubs, or gallops. No JVD or carotid bruits. Peripheral pulses equal bilaterally and palpable. No peripheral edema. GI Abdomen is soft without significant tenderness, masses, or guarding. Bowel sounds are normoactive. Rectal exam deferred.  No costovertebral angle tenderness. Normal appearing external genitalia. Tirado catheter is not present. HEME/LYMPH No palpable cervical lymphadenopathy and no hepatosplenomegaly. No petechiae or ecchymoses. MSK No gross joint deformities. SKIN Normal coloration, warm, dry. NEURO sedated.   PSYCH sedated    Data:   CBC with Differential:    Lab Results   Component Value Date    WBC 24.6 12/25/2021    RBC 3.20 12/25/2021    HGB 10.0 12/25/2021    HCT 31.0 12/25/2021     12/25/2021    MCV 96.9 12/25/2021    MCH 31.3 12/25/2021    MCHC 32.3 12/25/2021    RDW 13.2 12/25/2021    NRBC 1 12/23/2021    SEGSPCT 78.0 12/25/2021    BANDSPCT 18 12/25/2021    LYMPHOPCT 1.0 12/25/2021    PROMYELOPCT 2 12/22/2021    MONOPCT 2.0 12/25/2021    MYELOPCT 1 12/23/2021    BASOPCT 0.2 12/19/2021    MONOSABS 0.5 12/25/2021    LYMPHSABS 0.2 12/25/2021    EOSABS 0.0 12/19/2021    BASOSABS 0.0 12/19/2021    DIFFTYPE MANUAL DIFFERENTIAL 12/25/2021       CMP:     Lab Results   Component Value Date     12/26/2021    K 4.7 12/26/2021    CL 98 12/26/2021    CO2 30 12/26/2021    BUN 23 12/26/2021    CREATININE 0.3 12/26/2021    GFRAA >60 12/26/2021    AGRATIO 2.1 09/23/2020    LABGLOM >60 12/26/2021    GLUCOSE 157 12/26/2021    PROT 5.3 12/26/2021    LABALBU 2.9 12/26/2021    CALCIUM 8.7 12/26/2021    BILITOT 0.4 12/26/2021    ALKPHOS 152 12/26/2021    AST 24 12/26/2021    ALT 78 12/26/2021       Troponin:  Lab Results   Component Value Date    TROPONINT <0.010 12/09/2021       U/A:    Lab Results   Component Value Date    COLORU YELLOW 12/18/2021    PROTEINU 100 12/18/2021    WBCUA 2 12/18/2021    RBCUA 17 12/18/2021    MUCUS RARE 12/18/2021    TRICHOMONAS NONE SEEN 12/18/2021    BACTERIA RARE 12/18/2021    CLARITYU CLEAR 12/18/2021    SPECGRAV 1.026 12/18/2021    LEUKOCYTESUR SMALL 12/18/2021    UROBILINOGEN NORMAL 12/18/2021    BILIRUBINUR NEGATIVE 12/18/2021    BLOODU SMALL 12/18/2021       Urine Culture:  No components found for: CURINE    Radiology results:  XR CHEST PORTABLE   Final Result   Persistent bilateral airspace disease compatible with COVID-19 pneumonia. XR CHEST PORTABLE   Final Result   Satisfactory position of support devices. Stable severe COVID pneumonia         XR CHEST PORTABLE   Final Result   Multifocal opacities with mild increase in the upper lobes compared with   12/21/2021         XR CHEST PORTABLE   Final Result      XR CHEST PORTABLE   Final Result   Slightly improved appearance of multifocal pneumonia. XR CHEST PORTABLE   Final Result   No interval change in multifocal pneumonia. XR CHEST PORTABLE   Final Result   1. ETT tip 4.3 cm above the ellie. 2. Persistent bilateral multi lobar airspace consolidations, most pronounced   in the lung bases. Differential includes multi lobar pneumonia and ARDS. XR CHEST PORTABLE   Final Result   Mild improvement in opacities in the mid upper lungs. The mid to lower lung   opacities are not changed from the immediate prior exam.  Right side improved   from 12/17/2021 but increased at the left base. XR CHEST PORTABLE   Final Result   Moderate diffuse bilateral lung airspace disease which has worsened on the   left but improved on the right. Lines and tubes are stable. XR CHEST PORTABLE   Final Result   1. Right greater than left bilateral airspace opacities without significant   change. 2. Right-sided PICC line with the tip overlying the distal SVC/right atrial   region. 3. Tip of the ET tube is 5.3 cm above the ellie.          XR ABDOMEN FOR NG/OG/NE TUBE PLACEMENT   Final Result   Tip and side hole of the enteric tube are below the GE junction. XR CHEST PORTABLE   Final Result   1. Endotracheal tube 3.6 cm above the ellie. 2. Severe extensive patchy bilateral airspace opacities compatible with COVID   pneumonia. XR CHEST PORTABLE   Final Result   Increased bilateral airspace opacities suggesting increasing edema or   infection         XR CHEST PORTABLE   Final Result   Findings consistent with progressive/radiographically worse diffuse   pneumonitis/pneumonia compared to 12/12/2021. Pulmonary edema of cardiogenic   or noncardiogenic etiology may contribute to this appearance. No large areas   of pulmonary consolidation or detectable pleural effusions on the current   study. XR CHEST PORTABLE   Final Result   Subtle bilateral pulmonary infiltrates, right greater than left, without   acute interval change. XR CHEST PORTABLE   Final Result   Patchy airspace opacities bilaterally, may be related to pulmonary edema   versus pneumonia, progressed. CTA PULMONARY W CONTRAST   Final Result   No evidence of pulmonary embolism. Extensive scattered areas of patchy and ground-glass opacities are noted   throughout the bilateral lungs. These are nonspecific but could indicate an   atypical/viral pneumonia. XR CHEST (2 VW)   Final Result   Patchy infiltrates within the lower lungs bilaterally, most compatible with   multifocal pneumonia. Consider both typical and atypical etiologies,   including viral pneumonia. There is mild pulmonary vascular congestion, and therefore correlate with any   clinical evidence of superimposed pulmonary edema.              Medications:   Medications:    cefepime  2,000 mg IntraVENous Q8H    baricitinib  4 mg Oral Daily    dexamethasone  10 mg IntraVENous Q24H    midodrine  5 mg Oral TID    enoxaparin  1 mg/kg SubCUTAneous BID    chlorhexidine  15 mL Mouth/Throat BID    famotidine (PEPCID) injection  20 mg IntraVENous BID    lidocaine   Topical Once    ipratropium  4 puff Inhalation Q4H    And    albuterol sulfate HFA  4 puff Inhalation Q4H    sodium chloride flush  5-40 mL IntraVENous 2 times per day    vitamin D  2,000 Units Oral Daily    busPIRone  5 mg Oral BID    calcium carbonate  500 mg Oral Daily    levothyroxine  125 mcg Oral Daily    montelukast  10 mg Oral Nightly      Infusions:    norepinephrine Stopped (12/23/21 1321)    midazolam 9 mg/hr (12/25/21 2245)    dilTIAZem (CARDIZEM) 100 mg in dextrose 5% 100 mL (ADD-[place]) 5 mg/hr (12/25/21 1817)    fentaNYL 175 mcg/hr (12/26/21 0731)    propofol 70 mcg/kg/min (12/26/21 0733)    sodium chloride Stopped (12/19/21 1337)    vecuronium (NORCURON) infusion Stopped (12/19/21 1644)     PRN Meds: sodium chloride flush, 5-40 mL, PRN  sodium chloride, 25 mL, PRN  sodium chloride, 1 spray, Q2H PRN  ondansetron, 4 mg, Q8H PRN   Or  ondansetron, 4 mg, Q6H PRN  polyethylene glycol, 17 g, Daily PRN  acetaminophen, 650 mg, Q6H PRN   Or  acetaminophen, 650 mg, Q6H PRN  guaiFENesin-dextromethorphan, 5 mL, Q4H PRN  traZODone, 50 mg, Nightly PRN          Electronically signed by Barney Shone, MD on 12/26/2021 at 8:51 AM

## 2021-12-26 NOTE — PROGRESS NOTES
Pulmonary and Critical Care  Progress Note      VITALS:  /76   Pulse 94   Temp 99.2 °F (37.3 °C) (Rectal)   Resp (!) 51   Ht 5' 5\" (1.651 m)   Wt 158 lb 4.6 oz (71.8 kg)   SpO2 95%   BMI 26.34 kg/m²     Subjective:   CHIEF COMPLAINT :SOB     HPI:                The patient is on the vent and sedated. She has minimal response to painful stimuli. She failed the vent wean today    Objective:   PHYSICAL EXAM:    LUNGS:Occasional basal crackles   Abd-soft, BS+,NT  Ext- no pedal edema  CVS-s1s2, no murmurs      DATA:    CBC:  Recent Labs     12/24/21  0445 12/25/21  0538 12/26/21  0642   WBC 23.9* 24.6* 20.9*   RBC 3.13* 3.20* 3.16*   HGB 9.7* 10.0* 9.6*   HCT 30.2* 31.0* 30.2*    275 280   MCV 96.5 96.9 95.6   MCH 31.0 31.3* 30.4   MCHC 32.1 32.3 31.8*   RDW 13.2 13.2 13.1   SEGSPCT 70.0* 78.0*  --    BANDSPCT 27* 18*  --       BMP:  Recent Labs     12/24/21  1312 12/25/21  0538 12/26/21  0642   * 135 136   K 4.9 4.7 4.7   CL 98* 98* 98*   CO2 27 29 30   BUN 25* 23 23   CREATININE 0.4* 0.3* 0.3*   CALCIUM 8.0* 8.6 8.7   GLUCOSE 235* 150* 157*      ABG:  Recent Labs     12/26/21  0600   PH 7.38   PO2ART 86   AEG2NKX 50.0*   O2SAT 93.9*     BNP  No results found for: BNP   D-Dimer:  Lab Results   Component Value Date    DDIMER 544 (H) 12/10/2021      1. Radiology: None      Assessment/Plan     Patient Active Problem List    Diagnosis Date Noted    Acute respiratory failure with hypoxia ([facility] [address] 12/09/2021    Pneumonia due to COVID-19 virus 12/07/2021     Overview Note:     Tested positive on 12/4/2021      Anxiety 12/28/2020     Overview Note:     Patient has anxiety. She was taking Vistaril as needed but states he still feels anxious. BuSpar 5 mg twice a day      Adjustment insomnia 09/23/2020     Overview Note:     Pt has insomnia because of anxiety and stress  Trial of trazodone 50 mg daily . Side effects explained.       Abnormal mammogram 09/24/2019     Overview Note:     9/24/19 : abnormal : repeat in 6 months. Pt was informed by radiologist   Mammogram in 3/20 was normal.  Patient is followed by gynecologist  1/7/21 : Skylar Alarcon normal at [address] Tachycardia 06/26/2018    Essential hypertension 06/26/2018     Overview Note:     Patient has hypertension. Pt is on diltiazem  mg bid       Adjustment disorder with anxious mood 03/23/2018     Overview Note:     Pt wants to try vistaril prn.  Thyroid ca ([address] 12/15/2015     Overview Note:     Had total thyroidectomy by Dr Dana Sandhu. On synthroid. Managed by Dr Anjali Linder.  -he increased synthroid to 125 mcg daily except  1 1/2 tablet on Sundays  Pt has f/u appointment in 11/2021      Cough variant asthma      Overview Note:     On, albuterol and singulair. Pt is on Symbicort: feels well. Acute Hypoxic resp failure sec to Cardiogenic and non cardiogenic Pulmonary edema  Bilateral Pneumonia sec to COVID-19  Sec. Hypothryroidism  Anxiety  Afib rate controlled  VDRF       1. Decadron  2. Insulin  3. Inhalers  4. Barcitinib  5. Keep sats > 88%  6. Tube feeds  7. PT/OT  8. Daily SAT and SBT trials  9. CXR in am  10.  C.w present management    Electronically signed by Reed Delgadillo MD on 12/26/2021 at 12:05 PM

## 2021-12-27 NOTE — PROGRESS NOTES
Pulmonary and Critical Care  Progress Note      VITALS:  /82   Pulse 102   Temp 101.9 °F (38.8 °C) (Rectal)   Resp 26   Ht 5' 5\" (1.651 m)   Wt 158 lb 4.6 oz (71.8 kg)   SpO2 96%   BMI 26.34 kg/m²     Subjective:   Chief complaint: Acute hypoxemic respiratory failure, bilateral pneumonia secondary to COVID-19, atrial fibrillation, VDRF  Patient remains orally intubated and on mechanical ventilation  Mild to moderate resp distress  Patient on ventilator sedation with propofol and fentanyl. Not requiring IV pressors. Having episodes of coughing    Objective:   PHYSICAL EXAM:    LUNGS: Coarse bilateral rhonchi noted. No wheezing  On AC/PC with pressure 15 cm, rate 22, FiO2 65%, PEEP 5 with pH 7.42, PCO2 47, PO2 102  Serum sodium 135, potassium 4.5, creatinine 0.3 with BUN 23  Procalcitonin 0.119  WBC 25.7 with hemoglobin 8.6-left shift in differential    DATA:    CBC:  Recent Labs     12/25/21  0538 12/26/21  0642 12/27/21  0529   WBC 24.6* 20.9* 25.7*   RBC 3.20* 3.16* 2.78*   HGB 10.0* 9.6* 8.6*   HCT 31.0* 30.2* 27.0*    280 297   MCV 96.9 95.6 97.1   MCH 31.3* 30.4 30.9   MCHC 32.3 31.8* 31.9*   RDW 13.2 13.1 13.2   SEGSPCT 78.0* 67.0* 70.0*   BANDSPCT 18* 24* 18*      BMP:  Recent Labs     12/25/21  0538 12/26/21  0642 12/27/21  0529    136 135   K 4.7 4.7 4.5   CL 98* 98* 96*   CO2 29 30 29   BUN 23 23 23   CREATININE 0.3* 0.3* 0.3*   CALCIUM 8.6 8.7 8.6   GLUCOSE 150* 157* 176*      ABG:  Recent Labs     12/26/21  0600 12/27/21  0600   PH 7.38 7.42   PO2ART 86 102*   WAO0BBN 50.0* 47.0*   O2SAT 93.9* 95.8*     BNP  No results found for: BNP   D-Dimer:  Lab Results   Component Value Date    DDIMER 544 (H) 12/10/2021      1.  Radiology: Chest x-ray on 12/25/2021  Persistent bilateral airspace disease compatible with COVID-19 pneumonia    Assessment:     Patient Active Problem List   Diagnosis    Cough variant asthma    Thyroid ca ([facility] [address]    Adjustment disorder with anxious mood   

## 2021-12-27 NOTE — PROGRESS NOTES
12/27/21 1329   Vent Information   Equipment Changed HME   Vent Type 980   Vent Mode AC/PC   Pressure Ordered 15   Rate Set 22 bmp   FiO2  85 %   SpO2 92 %   SpO2/FiO2 ratio 108.24   Sensitivity 3   PEEP/CPAP 10   Humidification Source HME   Vent Patient Data   Peak Inspiratory Pressure 27 cmH2O   Mean Airway Pressure 19 cmH20   Rate Measured 27 br/min   Vt Exhaled 376 mL   Minute Volume 9.25 Liters   I:E Ratio 1:1.2   Cough/Sputum   Sputum How Obtained Endotracheal;Suctioned   $Obtained Sample $Induced Sputum   Cough Productive   Frequency Infrequent   Sputum Amount Moderate   Sputum Color Creamy; Tan   Tenacity Thick   Breath Sounds   Right Upper Lobe Diminished   Right Middle Lobe Diminished   Right Lower Lobe Diminished   Left Upper Lobe Diminished   Left Lower Lobe Diminished   Additional Respiratory  Assessments   Position Semi-Holm's   Alarm Settings   High Pressure Alarm 40 cmH2O   Delay Alarm 20 sec(s)   Low Minute Volume Alarm 2.5 L/min   Apnea (secs) 20 secs   High Respiratory Rate 40 br/min   Low Exhaled Vt  250 mL   ETT (adult)   Placement Date/Time: 12/16/21 (c) 6203   Preoxygenation: Yes  Mask Ventilation: Mask ventilation not attempted (0)  Technique: Video laryngoscopy  Tube Size: 7.5 mm  Laryngoscope: (c) Mac  Blade Size: 3  Location: Oral  Insertion attempts: 1  Placemen. ..    Secured at 24 cm   Measured From Lips   ET Placement Right   Secured By Commercial tube wren   Site Condition Dry

## 2021-12-27 NOTE — PROGRESS NOTES
CARDIOLOGY  NOTE        Name:  Mary Quiñones /Age/Sex: 1970  (45 y.o. female)   MRN & CSN:  [id number] & [id number] Admission Date/Time: 2021  7:44 PM   Location:  -A PCP: Marco A Hopkins MD       Hospital Day: 20        PLAN FROM CARDIOLOGY FOR TODAY:   CPM , no strips of a fib    - cardiology consult is for:  A fib    -  Interval history: In sr    · ASSESSMENT/ PLAN:  1.    ? A fib in SR  Now, no strips with a fib, ST only  2. Normal EF  3. BP stable on midodrine  4. Will FU as needed          Subjective: Todays complain: Patient  On vent    HPI:  Winston Hernandez is a 45 y. o.year old who and presents with had concerns including Shortness of Breath (covid +, pt sent over from infusion clinic due to oxygen levels below 95%. due to have covid infusion today. ). Chief Complaint   Patient presents with    Shortness of Breath     covid +, pt sent over from infusion clinic due to oxygen levels below 95%. due to have covid infusion today. Objective: Temperature:  Current - Temp: 98.5 °F (36.9 °C); Max - Temp  Av.7 °F (37.1 °C)  Min: 98.5 °F (36.9 °C)  Max: 98.9 °F (37.2 °C)    Respiratory Rate : Resp  Av.9  Min: 14  Max: 51    Pulse Range: Pulse  Av  Min: 66  Max: 94    Blood Presuure Range:  Systolic (54OHP), UVP:577 , Min:108 , MXQ:982   ; Diastolic (79YSO), IUY:95, Min:76, Max:86      Pulse ox Range: SpO2  Av.9 %  Min: 87 %  Max: 98 %    24hr I & O:      Intake/Output Summary (Last 24 hours) at 2021 0631  Last data filed at 2021 0553  Gross per 24 hour   Intake 1435.71 ml   Output 5800 ml   Net -4364.29 ml         /86   Pulse 73   Temp 98.5 °F (36.9 °C) (Rectal)   Resp 18   Ht 5' 5\" (1.651 m)   Wt 158 lb 4.6 oz (71.8 kg)   SpO2 97%   BMI 26.34 kg/m²           Review of Systems:   On vent      TELEMETRY: Sinus    has a past medical history of Abnormal mammogram, Cough variant asthma, COVID-19 virus infection, Depression, Migraines, Pulmonary nodules, Thyroid ca ([address] and Thyroid nodule. has a past surgical history that includes Thyroid surgery (9-25-15).   Physical Exam:  General:  On vent  Head:normal  Eye:normal  Neck:  No JVD   Chest:  Clear to auscultation, respiration easy  Cardiovascular:  RRR S1S2  Abdomen:   nontender  Extremities:  no edema  Pulses; palpable  Neuro: on vent    Medications:    cefepime  2,000 mg IntraVENous Q8H    baricitinib  4 mg Oral Daily    dexamethasone  10 mg IntraVENous Q24H    midodrine  5 mg Oral TID    enoxaparin  1 mg/kg SubCUTAneous BID    chlorhexidine  15 mL Mouth/Throat BID    famotidine (PEPCID) injection  20 mg IntraVENous BID    lidocaine   Topical Once    ipratropium  4 puff Inhalation Q4H    And    albuterol sulfate HFA  4 puff Inhalation Q4H    sodium chloride flush  5-40 mL IntraVENous 2 times per day    vitamin D  2,000 Units Oral Daily    busPIRone  5 mg Oral BID    calcium carbonate  500 mg Oral Daily    levothyroxine  125 mcg Oral Daily    montelukast  10 mg Oral Nightly      norepinephrine Stopped (12/23/21 1321)    midazolam 9 mg/hr (12/27/21 0056)    fentaNYL 175 mcg/hr (12/27/21 0454)    propofol 70 mcg/kg/min (12/27/21 0451)    sodium chloride Stopped (12/19/21 1337)    vecuronium (NORCURON) infusion Stopped (12/19/21 1644)     sodium chloride flush, sodium chloride, sodium chloride, ondansetron **OR** ondansetron, polyethylene glycol, acetaminophen **OR** acetaminophen, guaiFENesin-dextromethorphan, traZODone    Lab Data:  CBC:   Recent Labs     12/25/21  0538 12/26/21  0642   WBC 24.6* 20.9*   HGB 10.0* 9.6*   HCT 31.0* 30.2*   MCV 96.9 95.6    280     BMP:   Recent Labs     12/24/21  1312 12/25/21  0538 12/26/21  0642   * 135 136   K 4.9 4.7 4.7   CL 98* 98* 98*   CO2 27 29 30   BUN 25* 23 23   CREATININE 0.4* 0.3* 0.3*     LIVER PROFILE:   Recent Labs     12/24/21  1312 12/25/21  0538 12/26/21  6272 AST 53* 33 24   * 109* 78*   BILITOT 0.6 0.5 0.4   ALKPHOS 154* 147* 152*     PT/INR: No results for input(s): PROTIME, INR in the last 72 hours. APTT: No results for input(s): APTT in the last 72 hours. BNP:  No results for input(s): BNP in the last 72 hours.   TROPONIN: @TROPONINI:3@  Labs, consult, tests reviewed    Assessment/ PLAN:    As above                  Viola Luevano MD, MD 12/27/2021 6:31 AM

## 2021-12-27 NOTE — ADT AUTH CERT
thyromegaly or masses. RESP  Clear to auscultation, no wheezes, rales or rhonchi.  Symmetric chest movement while on room air. CARDIO/VASC           S1/S2 auscultated. Regular rate without appreciable murmurs, rubs, or gallops. No JVD or carotid bruits. Peripheral pulses equal bilaterally and palpable. No peripheral edema. GI        Abdomen is soft without significant tenderness, masses, or guarding. Bowel sounds are normoactive. Rectal exam deferred.        No costovertebral angle tenderness. Normal appearing external genitalia. Tirado catheter is not present. HEME/LYMPH            No palpable cervical lymphadenopathy and no hepatosplenomegaly. No petechiae or ecchymoses. MSK    No gross joint deformities. SKIN    Normal coloration, warm, dry. NEURO           sedated.    PSYCH            sedated      Abnl/Pertinent Labs/Radiology/Diagnostic Studies:   Wbc 27.5, hgb 11.5,       ABG: ph 7.36, po2 69, hco3 25.4,       Scheduled Meds:cefepime, 2,000 mg, IntraVENous, Q8H   baricitinib, 4 mg, Oral, Daily   dexamethasone, 10 mg, IntraVENous, Q24H   midodrine, 5 mg, Oral, TID   enoxaparin, 1 mg/kg, SubCUTAneous, BID   chlorhexidine, 15 mL, Mouth/Throat, BID   famotidine (PEPCID) injection, 20 mg, IntraVENous, BID   lidocaine, , Topical, Once   ipratropium, 4 puff, Inhalation, Q4H    And   albuterol sulfate HFA, 4 puff, Inhalation, Q4H   sodium chloride flush, 5-40 mL, IntraVENous, 2 times per day   vitamin D, 2,000 Units, Oral, Daily   busPIRone, 5 mg, Oral, BID   calcium carbonate, 500 mg, Oral, Daily   levothyroxine, 125 mcg, Oral, Daily   montelukast, 10 mg, Oral, Nightly         Continuous Infusions:norepinephrine Last Rate: Stopped (12/23/21 1321)   midazolam Last Rate: 8 mg/hr (12/23/21 1323)   dilTIAZem (CARDIZEM) 100 mg in dextrose 5% 100 mL (ADD-[place]) Last Rate: 10 mg/hr (12/23/21 0424)   fentaNYL Last Rate: 200 mcg/hr (12/23/21 1201)   propofol Last Rate: 55 mcg/kg/min (12/23/21 1320)   sodium chloride Last Rate: Stopped (12/19/21 1337)   vecuronium (NORCURON) infusion Last Rate: Stopped (12/19/21 7434)      MD Consults/Assessments & Plans:   Assessment and Plan:   Bobbi Rowland is a 45 y.o. Angie Carrillo presents with <principal problem not specified>       Severe sepsis secondary to COVID-19 pneumonia with acute hypoxic respiratory failure as endorgan defect   Acute on chronic COPD           On admission patient was on high flow oxygen,    placed on BiPAP, CT chest did not show any PE, patchy opacities bilaterally,    procal low, CRP elevated,    Pulmonology consult       S/p toci, monoclonal antibodies    Continue dexamethasone and PPI   Baricitinib       Was intubated 12/16   Vent management per pulmonology   Vancomycin and cefepime day 6   DC after 7-day course   She is on 90% FiO2 and barely saturating around 90.   -Continue current measures.           Mood disorder: Buspirone       Hypothyroidism: Levothyroxine       A. fib: Continue diltiazem infusion   -Continue Lovenox full therapeutic dose.       Appreciate cardiology input           DVT prophlaxis:   *Treatment dose Lovenox      PULM:   Acute Hypoxic resp failure sec to Cardiogenic and non cardiogenic Pulmonary edema   Bilateral Pneumonia sec to COVID-19   Sec. Hypothryroidism   Anxiety   Afib rate controlled   VDRF           1. Pan cx   2. Abx   3. F/u C&S   4. Barcitinib   5. Decadron   6. Insulin   7. Inhalers   8. Keep sats > 88%   9. CPT   10. PT/OT   11. RASS of -1 to -2   12. SAT and SBT trial as tolerated   13.  C/w present management            PT/OT/SLP/CM Assessments or Notes:                    Pneumonia - Care Day 14 (12/22/2021) by Osiel Saab RN       Review Status Review Entered   Completed 12/23/2021 15:34      Criteria Review      Care Day: 14 Care Date: 12/22/2021 Level of Care: ICU    Guideline Day 2    Clinical Status    (X) * No CO2 retention or acidosis    ( ) * No requirement for mechanical ventilation 12/23/2021 3:34 PM EST by June Delaney remains intubated on vent    (X) * Hypotension absent    12/23/2021 3:34 PM EST by [place]      100.2 (37.9) 42 119 153/75    (X) * Afebrile or fever improved    ( ) * No hypoxia on room air or oxygenation improved    12/23/2021 3:34 PM EST by Minh Downing      85% on 40% fio2 per vent    ( ) * Mental status improved or at baseline    12/23/2021 3:34 PM EST by Minh Downing      sedated on vent    Activity    ( ) * Increased activity    Routes    (X) Oral medications    12/23/2021 3:34 PM EST by Minh Downing      cardizem iv gtt, propofol iv gtt, versed iv gtt, fentanyl gtt, albuterol x4, atrovent x4, olumiant po x1, buspar po x2, maxipime iv x3, decadron iv x1, lovenox sc x2, pepcid iv x2, synthroid po x1, proamatine po x2, singulair, vanco iv x2, vit d po x    (X) Usual diet    Interventions    (X) Pulse oximetry    (X) Head of bed at 30 degrees    (X) Possible oxygen    Medications    (X) IV or oral antibiotics    * Milestone   Additional Notes   DATE: 12/22 day 14 ICU       Pertinent Updates:   Remains intubated      Physical Exam:   Physical Exam   Constitutional:        General: She is not in acute distress. HENT:       Head: Normocephalic and atraumatic. Eyes:       Pupils: Pupils are equal, round, and reactive to light. Cardiovascular:       Rate and Rhythm: Normal rate and regular rhythm.       Heart sounds: No murmur heard.          Pulmonary:       Effort: Pulmonary effort is normal. No respiratory distress.       Breath sounds: Normal breath sounds. No wheezing. Abdominal:       General: Bowel sounds are normal. There is no distension.       Palpations: Abdomen is soft.       Tenderness: There is no abdominal tenderness. Musculoskeletal:          General: No swelling.       Cervical back: Normal range of motion. Skin:      General: Skin is warm.     Neurological:       Cranial Nerves: No cranial nerve deficit.       Comments:

## 2021-12-27 NOTE — PROGRESS NOTES
12/27/21 1717   Vent Information   Vent Type 980   Vent Mode AC/PC   Pressure Ordered 15   Rate Set 22 bmp   FiO2  75 %   SpO2 99 %   SpO2/FiO2 ratio 132   Sensitivity 3   PEEP/CPAP 10   Humidification Source HME   Nitric Oxide/Epoprostenol In Use? No   Vent Patient Data   Peak Inspiratory Pressure 27 cmH2O   Mean Airway Pressure 18 cmH20   Rate Measured 25 br/min   Vt Exhaled 533 mL   Minute Volume 9.5 Liters   I:E Ratio 1:1.2   Cough/Sputum   Sputum How Obtained Endotracheal;Suctioned   $Obtained Sample $Induced Sputum   Cough Productive   Frequency Infrequent   Sputum Amount Moderate   Sputum Color Frothy; Tan   Tenacity Thick   Breath Sounds   Right Upper Lobe Diminished   Right Middle Lobe Diminished   Right Lower Lobe Diminished   Left Upper Lobe Diminished   Left Lower Lobe Diminished   Additional Respiratory  Assessments   Position Semi-Holm's   Alarm Settings   High Pressure Alarm 40 cmH2O   Delay Alarm 20 sec(s)   Low Minute Volume Alarm 2.5 L/min   Apnea (secs) 20 secs   High Respiratory Rate 40 br/min   Low Exhaled Vt  250 mL   ETT (adult)   Placement Date/Time: 12/16/21 (c) 5058   Preoxygenation: Yes  Mask Ventilation: Mask ventilation not attempted (0)  Technique: Video laryngoscopy  Tube Size: 7.5 mm  Laryngoscope: (c) Mac  Blade Size: 3  Location: Oral  Insertion attempts: 1  Placemen. ..    Secured at 24 cm   Measured From Lips   ET Placement Right   Secured By Commercial tube wren   Site Condition Dry

## 2021-12-27 NOTE — PROGRESS NOTES
Hospitalist Progress Note      Name:  Mary Quiñones /Age/Sex: 1970  (45 y.o. female)   MRN & CSN:  [id number] & [id number] Admission Date/Time: 2021  7:44 PM   Location:  -A PCP: Marco A Hopkins MD         Hospital Day:     Assessment and Plan:   Mary Quiñones is a 45 y.o.  female  who presents with <principal problem not specified>    Severe sepsis secondary to COVID-19 pneumonia with acute hypoxic respiratory failure as endorgan defect  Acute on chronic COPD        On admission patient was on high flow oxygen,   placed on BiPAP, CT chest did not show any PE, patchy opacities bilaterally,   procal low, CRP elevated,   Pulmonology consult  Repeat x-ray  S/p toci, monoclonal antibodies   Continue dexamethasone and PPI  Baricitinib     Was intubated   Vent management per pulmonology  Vancomycin and cefepime day 6  DC after 7-day course  She is on 65% FiO2, with PEEP of 8 and  saturating around 96%. -Continue current measures.        Mood disorder: Buspirone     Hypothyroidism: Levothyroxine     A. fib: Continue diltiazem infusion  -Continue Lovenox full therapeutic dose. Appreciate cardiology input        DVT prophlaxis:   *Treatment dose Lovenox    Diet Diet NPO  ADULT TUBE FEEDING; Nasogastric; Peptide Based High Protein; Continuous; 25; Yes; 25; Q 4 hours; 45; 30; Q 4 hours   DVT Prophylaxis [x] Lovenox, []  Heparin, [] SCDs, [] Ambulation   GI Prophylaxis [] PPI,  [] H2 Blocker,  [] Carafate,  [] Diet/Tube Feeds   Code Status Full Code   Disposition Patient requires continued admission due to respiratory failure   MDM [] Low, [] Moderate,[x]  High  Patient's risk as above due to respiratory failure     Subjective     Patient intubated and unable to provide update     Objective:        Intake/Output Summary (Last 24 hours) at 2021 1645  Last data filed at 2021 0553  Gross per 24 hour   Intake --   Output 2200 ml   Net -2200 ml      Vitals:   Vitals:    21 1625 BP: 103/73   Pulse: 86   Resp: 22   Temp: 98.6 °F (37 °C)   SpO2: 100%       Vent Information  $Ventilation: $Subsequent Day  Skin Assessment: Clean, dry, & intact  Suction Catheter Diameter: 14  Equipment Changed: HME  Vent Type: 980  Vent Mode: AC/PC  Vt Ordered: 0 mL  Pressure Ordered: 15  Rate Set: 22 bmp  FiO2 : 85 %  SpO2: 100 %  SpO2/FiO2 ratio: 111.76  PaO2/FiO2 ratio: 3  Sensitivity: 3  PEEP/CPAP: 10  I Time/ I Time %: 1.24 s  Humidification Source: HME  Nitric Oxide/Epoprostenol In Use?: No  Mask Type: Full face mask  Mask Size: Small  Bonnet size: Medium  Recent Labs     12/27/21  0600   PH 7.42   ARU8PHK 47.0*   PO2ART 102*   [id number] 30.5*   CO2CT 31.9*   O2SAT 95.8*   LABCARB 3.3       Physical Exam:   GEN  female, layingt in bed in no apparent distress. Appears given age. EYES Pupils are equally round. No scleral erythema, discharge, or conjunctivitis. HENT Mucous membranes are moist. Oral pharynx without exudates, no evidence of thrush. Endotracheal tube in situ  NECK Supple, no apparent thyromegaly or masses. RESP Clear to auscultation, no wheezes, rales or rhonchi. Symmetric chest movement while on room air. CARDIO/VASC S1/S2 auscultated. Regular rate without appreciable murmurs, rubs, or gallops. No JVD or carotid bruits. Peripheral pulses equal bilaterally and palpable. No peripheral edema. GI Abdomen is soft without significant tenderness, masses, or guarding. Bowel sounds are normoactive. Rectal exam deferred.  No costovertebral angle tenderness. Normal appearing external genitalia. Tirado catheter is not present. HEME/LYMPH No palpable cervical lymphadenopathy and no hepatosplenomegaly. No petechiae or ecchymoses. MSK No gross joint deformities. SKIN Normal coloration, warm, dry. NEURO sedated.   PSYCH sedated    Data:   CBC with Differential:    Lab Results   Component Value Date    WBC 25.7 12/27/2021    RBC 2.78 12/27/2021    HGB 8.6 12/27/2021    HCT 27.0 12/27/2021    PLT 297 12/27/2021    MCV 97.1 12/27/2021    MCH 30.9 12/27/2021    MCHC 31.9 12/27/2021    RDW 13.2 12/27/2021    NRBC 1 12/23/2021    SEGSPCT 70.0 12/27/2021    BANDSPCT 18 12/27/2021    LYMPHOPCT 6.0 12/27/2021    PROMYELOPCT 2 12/22/2021    MONOPCT 4.0 12/27/2021    MYELOPCT 1 12/27/2021    BASOPCT 0.2 12/19/2021    MONOSABS 1.0 12/27/2021    LYMPHSABS 1.5 12/27/2021    EOSABS 0.0 12/19/2021    BASOSABS 0.0 12/19/2021    DIFFTYPE MANUAL DIFFERENTIAL 12/27/2021       CMP:     Lab Results   Component Value Date     12/27/2021    K 4.5 12/27/2021    CL 96 12/27/2021    CO2 29 12/27/2021    BUN 23 12/27/2021    CREATININE 0.3 12/27/2021    GFRAA >60 12/27/2021    AGRATIO 2.1 09/23/2020    LABGLOM >60 12/27/2021    GLUCOSE 176 12/27/2021    PROT 5.4 12/27/2021    LABALBU 2.8 12/27/2021    CALCIUM 8.6 12/27/2021    BILITOT 0.3 12/27/2021    ALKPHOS 165 12/27/2021    AST 29 12/27/2021    ALT 87 12/27/2021       Troponin:  Lab Results   Component Value Date    TROPONINT <0.010 12/09/2021       U/A:    Lab Results   Component Value Date    COLORU YELLOW 12/18/2021    PROTEINU 100 12/18/2021    WBCUA 2 12/18/2021    RBCUA 17 12/18/2021    MUCUS RARE 12/18/2021    TRICHOMONAS NONE SEEN 12/18/2021    BACTERIA RARE 12/18/2021    CLARITYU CLEAR 12/18/2021    SPECGRAV 1.026 12/18/2021    LEUKOCYTESUR SMALL 12/18/2021    UROBILINOGEN NORMAL 12/18/2021    BILIRUBINUR NEGATIVE 12/18/2021    BLOODU SMALL 12/18/2021       Urine Culture:  No components found for: CURINE    Radiology results:  XR CHEST PORTABLE   Final Result   Persistent bilateral airspace disease compatible with COVID-19 pneumonia. XR CHEST PORTABLE   Final Result   Satisfactory position of support devices.       Stable severe COVID pneumonia         XR CHEST PORTABLE   Final Result   Multifocal opacities with mild increase in the upper lobes compared with   12/21/2021         XR CHEST PORTABLE   Final Result      XR CHEST PORTABLE   Final Result mg, Q8H PRN   Or  ondansetron, 4 mg, Q6H PRN  polyethylene glycol, 17 g, Daily PRN  acetaminophen, 650 mg, Q6H PRN   Or  acetaminophen, 650 mg, Q6H PRN  guaiFENesin-dextromethorphan, 5 mL, Q4H PRN  traZODone, 50 mg, Nightly PRN          Electronically signed by Shaniqua Strickland MD on 12/27/2021 at 4:45 PM

## 2021-12-28 NOTE — PROGRESS NOTES
o2 saturations dropping to 84%,  Current settings 65% 10PEEP. RN gave 100% oxygen and called Natty Ped RT, she requested I increase Fi02 to 80%, RN agreed. It took patient 3 full min to rebound, currently 94%. RN to monitor.

## 2021-12-28 NOTE — PROGRESS NOTES
Sharlene RT at bedside and adjusted vent settings. Noted little effect. We called  and updated him regarding patient's status and vent modifications. New order for one time 2mg versed bolus from IV infusion bag. If effective primary RN may adjust Rass on continuous infusions to reflect -4 for vent compliance.

## 2021-12-28 NOTE — PROGRESS NOTES
12/28/21 1327   Vent Information   Vent Type 980   Vent Mode AC/PC   Pressure Ordered 15   Rate Set 22 bmp   FiO2  65 %   SpO2 95 %   SpO2/FiO2 ratio 146.15   Sensitivity 3   PEEP/CPAP 10   Humidification Source HME   Nitric Oxide/Epoprostenol In Use? No   Vent Patient Data   Peak Inspiratory Pressure 28 cmH2O   Mean Airway Pressure 18 cmH20   Rate Measured 24 br/min   Vt Exhaled 447 mL   Minute Volume 12.3 Liters   I:E Ratio 1:1.2   Cough/Sputum   Sputum How Obtained Endotracheal;Suctioned   $Obtained Sample $Induced Sputum   Cough Productive   Frequency Infrequent   Sputum Amount Moderate   Sputum Color Creamy; Tan   Tenacity Thick   Breath Sounds   Right Upper Lobe Diminished   Right Middle Lobe Diminished   Right Lower Lobe Diminished   Left Upper Lobe Diminished   Left Lower Lobe Diminished   Additional Respiratory  Assessments   Position Semi-Holm's   Alarm Settings   High Pressure Alarm 40 cmH2O   Delay Alarm 20 sec(s)   Low Minute Volume Alarm 2.5 L/min   Apnea (secs) 20 secs   High Respiratory Rate 40 br/min   Low Exhaled Vt  250 mL   ETT (adult)   Placement Date/Time: 12/16/21 (c) 3426   Preoxygenation: Yes  Mask Ventilation: Mask ventilation not attempted (0)  Technique: Video laryngoscopy  Tube Size: 7.5 mm  Laryngoscope: (c) Mac  Blade Size: 3  Location: Oral  Insertion attempts: 1  Placemen. ..    Secured at 23 cm   Measured From [address] [place] [address],[address] By Commercial tube wren   Site Condition Dry

## 2021-12-28 NOTE — CONSULTS
Palliative Care consult for patient with Covid PNA respiratory failure. She is currently sedated on Full vent settings with FIO2 of 65% and peep of 10. Saturations are stable in the high 80's -low 90's. Respirations are labored and in the high 20's and 30's. Caitlyn Duran has been at bedside to adjust vent settings in hopes to decrease patient's work of breathing. Adjustments were minimally effective. 2mg versed bolus was given via infusion bag per . Administered bolus with Liu Velasquez. So far it has been minimally effective as well. Bp is stable and Hr is tachy in the low 100's. Juan A Carl will continue to assess patient and will be in contact with physicians. My recommendation is to start a paralytic and attempt prone position. Without a paralytic, proning may cause further work of breathing and stress. Attempted to call patient's spouse Daria Fairchild to discuss goals of care. He did not answer. Left a HIPPA compliant  to return my call. 1600- Spoke to patient' spouse Daria Fairchild . Assessed his understanding of patient's current medical condition. Daria Fairchild verbalized that he knows patient is declining and he is hopeful she gets better but he is fearful she will not. Explained Palliative care and updated him on patient's current status. Patient is to be started on Nimbex to help with vent compliance and to hopefully prone her once optimum level of sedation is achieved. Daria Fairchild is somewhat familiar with paralytic as patient was on it previously. Explained that her current vital signs are stable and we are mostly concerned with her work of breathing right now. He verbalized understanding and would like to continue with aggressive interventions. Daria Fairchild inquired about a visitation. Offered to hold the phone up to patient's ear now but Daria Fairchild states he is at his daughter's flatev tournament and is trying to keep a straight face. They have two children ages 15 and 13.  Today is 20 days since patient's positive covid test but she has still been having fevers which may cause isolation to be extended. We did arrange for a short visit tomorrow am.  updated on conversation and planned visit tomorrow.

## 2021-12-28 NOTE — PROGRESS NOTES
Ayana-palliative care RN, Sharlene RT, and myself at bedside. Patient continues to have increased WOB,  Attempting ventilator changes did improve oxygenation, o2 sats 91% steadily at this time, but WOB has minimally improved, will attempt to increase sedation to continue to improve WOB and monitor, if not effective will decrease to current rate. Dr Zarate [place] agrees, order modified. ABG in 1 hour, [address] RN agreed to call family and update.

## 2021-12-28 NOTE — PROGRESS NOTES
RN contacted  Sarahi Davison. During our previous conversation he stated he would call me back around 1700 for another update. No call has been received, but he has spoke with [facility]. RN ensured that he understood todays events and had all the answers to his questions, which he agreed. No further needs at this time. RN to continue to monitor closely.

## 2021-12-28 NOTE — PROGRESS NOTES
Assessment completed,  Patient is less responsive to pain, this AM patient did not withdraw from pain but did have a facial grimace to painful stimuli. This afternoon grimace is less obvious. Patient is more comfortable on ventilator and have complete compliance at this time. HR has improved back to WNL and oxygenating at 96% at this time. Will continue to monitor, if patient maintains vent compliance and current RASS will decrease sedation. RN continues to monitor closely.

## 2021-12-28 NOTE — PROGRESS NOTES
Spoke with Dr Deloris De Luna on phone regarding pt's increased work of breathing. Vent settings changed from pressure control to AC/VC+. Will repeat ABG in one hour. 12/28/21 1434   Vent Information   Vent Mode AC/VC+   Vt Ordered 500 mL   Rate Set 22 bmp   FiO2  65 %   SpO2 90 %   SpO2/FiO2 ratio 138.46   Sensitivity 2   PEEP/CPAP 10   I Time/ I Time % 0.78 s   Humidification Source HME   Vent Patient Data   Peak Inspiratory Pressure 28 cmH2O   Mean Airway Pressure 17 cmH20   Rate Measured 33 br/min   Vt Exhaled 511 mL   Minute Volume 15.8 Liters   I:E Ratio 1:2   Spontaneous Breathing Trial (SBT) RT Doc   Pulse 116   Additional Respiratory  Assessments   Resp (!) 33   Position Semi-Holm's   Alarm Settings   High Pressure Alarm 45 cmH2O   Delay Alarm 20 sec(s)   Low Minute Volume Alarm 2.5 L/min   Apnea (secs) 20 secs   High Respiratory Rate 40 br/min   Low Exhaled Vt  250 mL   ETT (adult)   Placement Date/Time: 12/16/21 (c) 1446   Preoxygenation: Yes  Mask Ventilation: Mask ventilation not attempted (0)  Technique: Video laryngoscopy  Tube Size: 7.5 mm  Laryngoscope: (c) Mac  Blade Size: 3  Location: Oral  Insertion attempts: 1  Placemen. ..    Secured at 23 cm   Measured From [address] By Commercial tube wren   Site Condition Dry   Cuff Pressure   (mov)   Non-Surgical Airway 12/16/21 Endo Tracheal Tube   Placement Date/Time: 12/16/21 7579   Placed By: Licensed provider  Inserted by: Uday Felipe CRNA   Insertion attempts: 1  Airway Device: Endo Tracheal Tube  Size: 7.5   Secured at 23 cm   Measured From [address] [facility] By Commercial tube wren   Site Condition Dry   Cuff Pressure   (mov)

## 2021-12-28 NOTE — PROGRESS NOTES
Hospitalist Progress Note      Name:  Bobbi Rowland /Age/Sex: 1970  (45 y.o. female)   MRN & CSN:  [id number] & [id number] Admission Date/Time: 2021  7:44 PM   Location:  -A PCP: Tarri Gaucher, MD         Hospital Day:     Assessment and Plan:   Bobbi Rowland is a 45 y.o.  female  who presents with <principal problem not specified>    Severe sepsis secondary to COVID-19 pneumonia with acute hypoxic respiratory failure as endorgan defect  Acute on chronic COPD     On admission patient was on high flow oxygen,   placed on BiPAP, CT chest did not show any PE, patchy opacities bilaterally,   procal low, CRP elevated,   Pulmonology consult  Repeat x-ray  S/p toci, monoclonal antibodies   Continue dexamethasone and PPI  Baricitinib     Was intubated   Vent management per pulmonology  Vancomycin and cefepime day 6  DC after 7-day course  She is on 65% FiO2, with PEEP of 8 and  saturating around 96%. -Continue current measures.     Mood disorder: Buspirone     Hypothyroidism: Levothyroxine     A. fib: Continue diltiazem infusion  -Continue Lovenox full therapeutic dose. Appreciate cardiology input     Patient continuing to not tolerate vent despite max sedation, will have to resume paralytics in order to improve work of breathing. Discussed with ICU pharmacist, and nursing staff.     Dispo: Very poor prognosis, discussed with palliative care nurse  DVT prophlaxis:   *Treatment dose Lovenox    Diet Diet NPO  ADULT TUBE FEEDING; Nasogastric; Peptide Based High Protein; Continuous; 25; Yes; 25; Q 4 hours; 45; 30; Q 4 hours   DVT Prophylaxis [x] Lovenox, []  Heparin, [] SCDs, [] Ambulation   GI Prophylaxis [] PPI,  [] H2 Blocker,  [] Carafate,  [] Diet/Tube Feeds   Code Status Full Code   Disposition Patient requires continued admission due to respiratory failure   MDM [] Low, [] Moderate,[x]  High  Patient's risk as above due to respiratory failure     Subjective     Patient intubated and unable to provide update     Objective: Intake/Output Summary (Last 24 hours) at 12/28/2021 1810  Last data filed at 12/28/2021 0600  Gross per 24 hour   Intake 3212.25 ml   Output 2450 ml   Net 762.25 ml      Vitals:   Vitals:    12/28/21 1741   BP:    Pulse:    Resp:    Temp:    SpO2: 94%       Vent Information  $Ventilation: $Subsequent Day  Skin Assessment: Clean, dry, & intact  Suction Catheter Diameter: 14  Equipment Changed: HME  Vent Type: 980  Vent Mode: AC/VC+  Vt Ordered: 500 mL  Pressure Ordered: 15  Rate Set: 22 bmp  FiO2 : (S) 80 %  SpO2: 94 %  SpO2/FiO2 ratio: 117.5  PaO2/FiO2 ratio: 3  Sensitivity: 2  PEEP/CPAP: 10  I Time/ I Time %: 0.78 s  Humidification Source: HME  Nitric Oxide/Epoprostenol In Use?: No  Mask Type: Full face mask  Mask Size: Small  Bonnet size: Medium  Recent Labs     12/28/21  1600   PH 7.28*   [id number] 67.0*   PO2ART 55*   [id number] 31.5*   CO2CT 33.6*   O2SAT 82.8*   LABCARB 2.8       Physical Exam:   GEN  female, layingt in bed in no apparent distress. Appears given age. EYES Pupils are equally round. No scleral erythema, discharge, or conjunctivitis. HENT Mucous membranes are moist. Oral pharynx without exudates, no evidence of thrush. Endotracheal tube in situ  NECK Supple, no apparent thyromegaly or masses. RESP Clear to auscultation, no wheezes, rales or rhonchi. Symmetric chest movement while on room air. CARDIO/VASC S1/S2 auscultated. Regular rate without appreciable murmurs, rubs, or gallops. No JVD or carotid bruits. Peripheral pulses equal bilaterally and palpable. No peripheral edema. GI Abdomen is soft without significant tenderness, masses, or guarding. Bowel sounds are normoactive. Rectal exam deferred.  No costovertebral angle tenderness. Normal appearing external genitalia. Tirado catheter is not present. HEME/LYMPH No palpable cervical lymphadenopathy and no hepatosplenomegaly. No petechiae or ecchymoses.   MSK No gross joint deformities. SKIN Normal coloration, warm, dry. NEURO sedated. PSYCH sedated    Data:   CBC with Differential:    Lab Results   Component Value Date    WBC 26.8 12/28/2021    RBC 3.28 12/28/2021    HGB 10.1 12/28/2021    HCT 31.5 12/28/2021     12/28/2021    MCV 96.0 12/28/2021    MCH 30.8 12/28/2021    MCHC 32.1 12/28/2021    RDW 13.1 12/28/2021    NRBC 1 12/23/2021    SEGSPCT 73.0 12/28/2021    BANDSPCT 12 12/28/2021    LYMPHOPCT 7.0 12/28/2021    PROMYELOPCT 2 12/22/2021    MONOPCT 4.0 12/28/2021    MYELOPCT 1 12/28/2021    BASOPCT 0.2 12/19/2021    MONOSABS 1.1 12/28/2021    LYMPHSABS 1.9 12/28/2021    EOSABS 0.0 12/19/2021    BASOSABS 0.0 12/19/2021    DIFFTYPE MANUAL DIFFERENTIAL 12/28/2021       CMP:     Lab Results   Component Value Date     12/28/2021    K 4.6 12/28/2021    CL 96 12/28/2021    CO2 30 12/28/2021    BUN 23 12/28/2021    CREATININE 0.3 12/28/2021    GFRAA >60 12/28/2021    AGRATIO 2.1 09/23/2020    LABGLOM >60 12/28/2021    GLUCOSE 196 12/28/2021    PROT 5.6 12/28/2021    LABALBU 2.7 12/28/2021    CALCIUM 8.8 12/28/2021    BILITOT 0.6 12/28/2021    ALKPHOS 227 12/28/2021    AST 32 12/28/2021     12/28/2021       Troponin:  Lab Results   Component Value Date    TROPONINT <0.010 12/09/2021       U/A:    Lab Results   Component Value Date    COLORU YELLOW 12/18/2021    PROTEINU 100 12/18/2021    WBCUA 2 12/18/2021    RBCUA 17 12/18/2021    MUCUS RARE 12/18/2021    TRICHOMONAS NONE SEEN 12/18/2021    BACTERIA RARE 12/18/2021    CLARITYU CLEAR 12/18/2021    SPECGRAV 1.026 12/18/2021    LEUKOCYTESUR SMALL 12/18/2021    UROBILINOGEN NORMAL 12/18/2021    BILIRUBINUR NEGATIVE 12/18/2021    BLOODU SMALL 12/18/2021       Urine Culture:  No components found for: AMINATA    Radiology results:  XR CHEST PORTABLE   Final Result   Decreased lung volumes.   Increased airspace opacities in the right lung may   represent progressed pneumonia, may be due in part to the reduced lung volume and bronchovascular crowding. Tubes and lines are unchanged in position. XR CHEST PORTABLE   Final Result   Persistent bilateral airspace disease compatible with COVID-19 pneumonia. XR CHEST PORTABLE   Final Result   Satisfactory position of support devices. Stable severe COVID pneumonia         XR CHEST PORTABLE   Final Result   Multifocal opacities with mild increase in the upper lobes compared with   12/21/2021         XR CHEST PORTABLE   Final Result      XR CHEST PORTABLE   Final Result   Slightly improved appearance of multifocal pneumonia. XR CHEST PORTABLE   Final Result   No interval change in multifocal pneumonia. XR CHEST PORTABLE   Final Result   1. ETT tip 4.3 cm above the ellie. 2. Persistent bilateral multi lobar airspace consolidations, most pronounced   in the lung bases. Differential includes multi lobar pneumonia and ARDS. XR CHEST PORTABLE   Final Result   Mild improvement in opacities in the mid upper lungs. The mid to lower lung   opacities are not changed from the immediate prior exam.  Right side improved   from 12/17/2021 but increased at the left base. XR CHEST PORTABLE   Final Result   Moderate diffuse bilateral lung airspace disease which has worsened on the   left but improved on the right. Lines and tubes are stable. XR CHEST PORTABLE   Final Result   1. Right greater than left bilateral airspace opacities without significant   change. 2. Right-sided PICC line with the tip overlying the distal SVC/right atrial   region. 3. Tip of the ET tube is 5.3 cm above the ellie. XR ABDOMEN FOR NG/OG/NE TUBE PLACEMENT   Final Result   Tip and side hole of the enteric tube are below the GE junction. XR CHEST PORTABLE   Final Result   1. Endotracheal tube 3.6 cm above the ellie. 2. Severe extensive patchy bilateral airspace opacities compatible with COVID   pneumonia.          XR CHEST PORTABLE Final Result   Increased bilateral airspace opacities suggesting increasing edema or   infection         XR CHEST PORTABLE   Final Result   Findings consistent with progressive/radiographically worse diffuse   pneumonitis/pneumonia compared to 12/12/2021. Pulmonary edema of cardiogenic   or noncardiogenic etiology may contribute to this appearance. No large areas   of pulmonary consolidation or detectable pleural effusions on the current   study. XR CHEST PORTABLE   Final Result   Subtle bilateral pulmonary infiltrates, right greater than left, without   acute interval change. XR CHEST PORTABLE   Final Result   Patchy airspace opacities bilaterally, may be related to pulmonary edema   versus pneumonia, progressed. CTA PULMONARY W CONTRAST   Final Result   No evidence of pulmonary embolism. Extensive scattered areas of patchy and ground-glass opacities are noted   throughout the bilateral lungs. These are nonspecific but could indicate an   atypical/viral pneumonia. XR CHEST (2 VW)   Final Result   Patchy infiltrates within the lower lungs bilaterally, most compatible with   multifocal pneumonia. Consider both typical and atypical etiologies,   including viral pneumonia. There is mild pulmonary vascular congestion, and therefore correlate with any   clinical evidence of superimposed pulmonary edema.              Medications:   Medications:    cefepime  2,000 mg IntraVENous Q8H    baricitinib  4 mg Oral Daily    midodrine  5 mg Oral TID    enoxaparin  1 mg/kg SubCUTAneous BID    chlorhexidine  15 mL Mouth/Throat BID    famotidine (PEPCID) injection  20 mg IntraVENous BID    lidocaine   Topical Once    ipratropium  4 puff Inhalation Q4H    And    albuterol sulfate HFA  4 puff Inhalation Q4H    sodium chloride flush  5-40 mL IntraVENous 2 times per day    vitamin D  2,000 Units Oral Daily    busPIRone  5 mg Oral BID    levothyroxine  125 mcg Oral Daily    montelukast  10 mg Oral Nightly      Infusions:    midazolam      norepinephrine Stopped (12/23/21 1321)    midazolam 7 mg/hr (12/28/21 0768)    fentaNYL 175 mcg/hr (12/28/21 1558)    propofol 80 mcg/kg/min (12/28/21 1519)    sodium chloride Stopped (12/19/21 1337)    vecuronium (NORCURON) infusion 0.5 mcg/kg/min (12/28/21 1705)     PRN Meds: sodium chloride flush, 5-40 mL, PRN  sodium chloride, 25 mL, PRN  sodium chloride, 1 spray, Q2H PRN  ondansetron, 4 mg, Q8H PRN   Or  ondansetron, 4 mg, Q6H PRN  polyethylene glycol, 17 g, Daily PRN  acetaminophen, 650 mg, Q6H PRN   Or  acetaminophen, 650 mg, Q6H PRN  guaiFENesin-dextromethorphan, 5 mL, Q4H PRN  traZODone, 50 mg, Nightly PRN          Electronically signed by Sony Roth MD on 12/28/2021 at 6:10 PM

## 2021-12-28 NOTE — PROGRESS NOTES
Comprehensive Nutrition Assessment    Type and Reason for Visit:  Reassess    Nutrition Recommendations/Plan:     Combined nutrition therapies, vital high protein enteral nutrition at goal rate of 45 mL/hr and current propofol rate providing 1908 kcal and 94 grams of protein to meet 100% estimated needs. Continue EN as ordered, RD will continue to follow per nutrition protocol. Nutrition Assessment:  Pt continues intubated and sedated with vital high protein running at goal of 45 mL/hr. Pt continues at high nutrition risk at this time. Malnutrition Assessment:  Malnutrition Status: At risk for malnutrition (Comment)    Context:  Acute Illness       Estimated Daily Nutrient Needs:  Energy (kcal):  1855; Weight Used for Energy Requirements:  Current     Protein (g):  85; Weight Used for Protein Requirements:  Ideal        Fluid (ml/day):  2000; Method Used for Fluid Requirements:  1 ml/kcal      Nutrition Related Findings:  WBC 26.8      Wounds:  None       Current Nutrition Therapies:    Diet NPO  ADULT TUBE FEEDING; Nasogastric; Peptide Based High Protein; Continuous; 25; Yes; 25; Q 4 hours; 45; 30; Q 4 hours  Current Tube Feeding (TF) Orders:  · Feeding Route: Orogastric  · Formula: Peptide Based High Protein  · Schedule: Continuous (45 ml/hr per flowsheet)  · Additives/Modulars:  none ordered   · Water Flushes:  30 mL Q4H, per MD  · Current TF & Flush Orders Provides: 1080 kcal and 94 g protein      Anthropometric Measures:  · Height: 5' 5\" (165.1 cm)  · Current Body Weight: 158 lb 4.6 oz (71.8 kg)   · Admission Body Weight: 153 lb (69.4 kg)    · Usual Body Weight: 162 lb (73.5 kg)     · Ideal Body Weight: 125 lbs; % Ideal Body Weight 126.6 %   · BMI: 26.3  · BMI Categories: Overweight (BMI 25.0-29. 9)       Nutrition Diagnosis:   · Inadequate oral intake related to acute injury/trauma as evidenced by NPO or clear liquid status due to medical condition      Nutrition Interventions:   Food and/or Nutrient Delivery:  Continue Current Tube Feeding  Nutrition Education/Counseling:  No recommendation at this time   Coordination of Nutrition Care:  Continue to monitor while inpatient    Goals:  pt will meet greater than 75% of estimated nutrient needs via EN       Nutrition Monitoring and Evaluation:   Behavioral-Environmental Outcomes:  None Identified   Food/Nutrient Intake Outcomes:  Enteral Nutrition Intake/Tolerance  Physical Signs/Symptoms Outcomes:  Biochemical Data,GI Status,Weight,Skin,Hemodynamic Status,Fluid Status or Edema     Discharge Planning:     Too soon to determine     Electronically signed by Frida Bowden RD, LD on 12/28/21 at 2:28 PM EST    Contact: [phone]

## 2021-12-28 NOTE — PROGRESS NOTES
12/28/21 1715   Vent Information   Vent Type 980   Vent Mode AC/VC+   Vt Ordered 500 mL   Rate Set 22 bmp   FiO2  65 %   SpO2 91 %   SpO2/FiO2 ratio 140   Sensitivity 2   PEEP/CPAP 10   Humidification Source HME   Nitric Oxide/Epoprostenol In Use? No   Vent Patient Data   Peak Inspiratory Pressure 27 cmH2O   Mean Airway Pressure 15 cmH20   Rate Measured 25 br/min   Vt Exhaled 483 mL   Minute Volume 10.7 Liters   I:E Ratio 1:2.5   Cough/Sputum   Sputum How Obtained Endotracheal;Suctioned   $Obtained Sample $Induced Sputum   Cough Productive   Frequency Infrequent   Sputum Amount Moderate   Sputum Color Dark red   Tenacity Thick   Breath Sounds   Right Upper Lobe Diminished   Right Middle Lobe Diminished   Right Lower Lobe Diminished   Left Upper Lobe Diminished   Left Lower Lobe Diminished   Additional Respiratory  Assessments   Position Semi-Holm's   Alarm Settings   High Pressure Alarm 45 cmH2O   Delay Alarm 20 sec(s)   Low Minute Volume Alarm 2.5 L/min   Apnea (secs) 20 secs   High Respiratory Rate 40 br/min   Low Exhaled Vt  250 mL   ETT (adult)   Placement Date/Time: 12/16/21 (c) 4631   Preoxygenation: Yes  Mask Ventilation: Mask ventilation not attempted (0)  Technique: Video laryngoscopy  Tube Size: 7.5 mm  Laryngoscope: (c) Mac  Blade Size: 3  Location: Oral  Insertion attempts: 1  Placemen. ..    Secured at 23 cm   Measured From [address] [place] [address],[address] By Commercial tube wren   Site Condition Dry

## 2021-12-28 NOTE — PROGRESS NOTES
Pulmonary and Critical Care  Progress Note      VITALS:  /69   Pulse 112   Temp 100.9 °F (38.3 °C) (Rectal) Comment: cooling blanket remains in place  Resp 25   Ht 5' 5\" (1.651 m)   Wt 158 lb 4.6 oz (71.8 kg)   SpO2 91%   BMI 26.34 kg/m²     Subjective:   Chief complaint: Acute hypoxemic respiratory failure, severe sepsis, bilateral pneumonia secondary to COVID-19, atrial fibrillation, VDRF  Remains orally intubated and on mechanical ventilation-day 12  Mild to moderate resp distress  Patient on vent sedation with propofol, Versed and fentanyl  Still having episodes of cough and mild endotracheal secretions suctioned by nursing staff  Tube feeding at goal at 45 cc an hour  Objective:   PHYSICAL EXAM:    LUNGS: Scattered rhonchi throughout all lung fields with increased secretions noted on endotracheal suction  Remains on AC/PC 22/15, 70% FiO2, 10 of PEEP with arterial pH 7.34, PCO2 62, PO2 72 and O2 saturation 91%  Serum sodium 135, potassium 4.6, creatinine 0.3 with BUN 23  WBC 26.8 with hemoglobin 10.1 g-left shift in differential  Continues to have low-grade temp  DATA:    CBC:  Recent Labs     12/26/21  0642 12/27/21  0529 12/28/21  0400   WBC 20.9* 25.7* 26.8*   RBC 3.16* 2.78* 3.28*   HGB 9.6* 8.6* 10.1*   HCT 30.2* 27.0* 31.5*    297 310   MCV 95.6 97.1 96.0   MCH 30.4 30.9 30.8   MCHC 31.8* 31.9* 32.1   RDW 13.1 13.2 13.1   SEGSPCT 67.0* 70.0* 73.0*   BANDSPCT 24* 18* 12*      BMP:  Recent Labs     12/26/21  0642 12/27/21  0529 12/28/21  0400    135 135   K 4.7 4.5 4.6   CL 98* 96* 96*   CO2 30 29 30   BUN 23 23 23   CREATININE 0.3* 0.3* 0.3*   CALCIUM 8.7 8.6 8.8   GLUCOSE 157* 176* 196*      ABG:  Recent Labs     12/26/21  0600 12/27/21  0600 12/28/21  0600   PH 7.38 7.42 7.34   PO2ART 86 102* 72*   [id number] 50.0* 47.0* 62.0*   O2SAT 93.9* 95.8* 91.1*     BNP  No results found for: BNP   D-Dimer:  Lab Results   Component Value Date    DDIMER 544 (H) 12/10/2021      1.  Radiology: Portable chest x-ray this a.m. reviewed  Decreased lung volumes.  Increased airspace opacities in the right lung may   represent progressed pneumonia, may be due in part to the reduced lung volume   and bronchovascular crowding.       Tubes and lines are unchanged in position         Assessment:     Patient Active Problem List   Diagnosis    Cough variant asthma    [facility]roid ca ([facility] [address]    Adjustment disorder with anxious mood    Tachycardia    Essential hypertension    Abnormal mammogram    Adjustment insomnia    Anxiety    Pneumonia due to COVID-19 virus    Acute respiratory failure with hypoxia ([facility] [address]       Plan:   1. continue present treatment  2. Remains on IV cefepime for secondary pneumonic process.   3. Need to start considering tracheostomy with PEG tube placement in the near future    Ashley Everett MD MD  12/28/2021  10:28 AM

## 2021-12-28 NOTE — PROGRESS NOTES
12/28/21 0910   Vent Information   $Ventilation $Subsequent Day   Equipment Changed HME   Vent Type 980   Vent Mode AC/PC   Pressure Ordered 15   Rate Set 22 bmp   FiO2  70 %   SpO2 90 %   SpO2/FiO2 ratio 128.57   Sensitivity 3   PEEP/CPAP 10   Humidification Source HME   Nitric Oxide/Epoprostenol In Use? No   Vent Patient Data   Peak Inspiratory Pressure 27 cmH2O   Mean Airway Pressure 17 cmH20   Rate Measured 22 br/min   Vt Exhaled 518 mL   Minute Volume 11.7 Liters   I:E Ratio 1:1.2   Cough/Sputum   Sputum How Obtained Endotracheal;Suctioned   $Obtained Sample $Induced Sputum   Cough Productive   Frequency Infrequent   Sputum Amount Small   Sputum Color Creamy; Tan   Tenacity Thick   Breath Sounds   Right Upper Lobe Diminished   Right Middle Lobe Diminished   Right Lower Lobe Diminished   Left Upper Lobe Diminished   Left Lower Lobe Diminished   Additional Respiratory  Assessments   Position Semi-Holm's   Alarm Settings   High Pressure Alarm 40 cmH2O   Delay Alarm 20 sec(s)   Low Minute Volume Alarm 2.5 L/min   Apnea (secs) 20 secs   High Respiratory Rate 40 br/min   Low Exhaled Vt  250 mL   ETT (adult)   Placement Date/Time: 12/16/21 (c) 0674   Preoxygenation: Yes  Mask Ventilation: Mask ventilation not attempted (0)  Technique: Video laryngoscopy  Tube Size: 7.5 mm  Laryngoscope: (c) Mac  Blade Size: 3  Location: Oral  Insertion attempts: 1  Placemen. ..    Secured at 23 cm   Measured From [address] [place] [address],[address] By Commercial tube wren   Site Condition Dry

## 2021-12-28 NOTE — PROGRESS NOTES
RN contacted Dr Mathew Ibanez regarding patients RASS, patient continues to have issues with vent compliance. Patients RR is in the 30's, 34 at this time, HR has increased back to the 110-120's. o2 sats are in the high 80's. Patient is closer to a -4 than a -3. Dr Mathew Ibanez notified of all of this. He requested RT come to bedside and adjust settings to see if shell tolerate volume control instead of pressure control better. RN called RT,  Neeta Paz answered, she agreed to come to bedside. Not adjusting medication any further at this time due to RASS. will continue to monitor closely.

## 2021-12-28 NOTE — PROGRESS NOTES
Patient is having some decline,  Reviewing case with Dr Cleo Davis, RT and Luis Barr nurse,  RN contacted Dr Amin Kvng and updated her. Patient is found to have bright red bleeding from picc line and bloody secretions noted to in-line suction. Waiting on response. 12- Dr Amin Kvng in unit, discussing patient with myself and [facility] palliative care RN. Order to restart Vecuronium per previous order. RN agreed. Called pharmacy and requested a bag. They agreed to send up as soon as it is ready. RN agreed.

## 2021-12-28 NOTE — PROGRESS NOTES
Fi02 turned down from 70% to 65%,  Patient stacking breaths, coughing, again Vital signs are all increasing. Ranjan Bhardwaj RT at bedside, adjusting ventilator settings to assist with compliance. Will not adjust sedation any further at this time. RN to monitor.

## 2021-12-28 NOTE — PROGRESS NOTES
Patient continues to desaturate and become more tachycardic with minimal stimulation. Attempted to turn and reposition and patients HR increased to the 120's, o2 sats dropped into the 80's. 100% on vent, propofol is at max rate at 80mcgs. RASS is a -3 with the exception of vent compliance, cannot adjust sedation at this time. RN to continue to monitor closely and notify Dr Casimiro Frankel if this continues. Due to this reason SAT will not be completed as patient is not tolerating being stimulated.

## 2021-12-28 NOTE — PLAN OF CARE

## 2021-12-29 NOTE — PROCEDURES
Preprocedure diagnosis: Need for hemodynamic monitoring  Postprocedure diagnosis: Same    Procedure right radial arterial line placement using ultrasound guidance at the bedside    Surgeon: Dottie Mascorro MD    Estimated blood loss: Less than 20 cc    Description of procedure: The right wrist was prepped and draped in normal sterile fashion. A timeout was performed and site was confirmed. Ultrasound guidance was used to access the radial artery using a micropuncture radial needle. Wire was advanced under ultrasound guidance without difficulty. The micro sheath was then advanced over the wire. The wire and dilator were removed. There was excellent pulsatile flow. This was connected to the monitoring circuit and sutured into place. No immediate complications were noted.

## 2021-12-29 NOTE — ACP (ADVANCE CARE PLANNING)
Palliative Care follow up visit. Patient is s/p code blue from early this am. She is unstable with saturations in the 60% on full vent support. FIO2 is 100% and peep of 20. She had a chest tube placed for a large right tension pneumothorax. Chest tube is draining bright red blood. Patient is on levophed at 100mcg/min, dinesh at 80mcg/min, and epi at 30 mcg/min. Her Map is sustaining right at 65 via the arterial line. Patient's  Brittny Davila and mother in law are present at bedside. Offered emotional support. Brittny Davila does not think he wants to bring his girls in to see their mom like this. He inquired if he would have time to go talk to them. Explained that patient was at risk for her heart to stop again at any time due to her instability. Brittny Davila deferred leaving at this time. Inquired if Brittny Davila would want us to perform CPR again if patient's heart would stop. Brittny Davila said he did not want to put her through that. I explained compassionate extubation process. Brittny Davila stated that they would wait for another family member to arrive before moving forward with a compassionate extubation. Will continue current treatments for now and contact  to change code status to [facility]. Annie Zimmerman RN at bedside during our conversation. Offered water and privacy to family.

## 2021-12-29 NOTE — PROGRESS NOTES
Initiating compassionate wean. Respiratory @ beside to extubate patient. Pt given medications for comfort. Pt appears to not take any spontaneous breath post extubation. Pt appears to be calm and pain free.

## 2021-12-29 NOTE — FLOWSHEET NOTE
Referral visit. This  was paged for end-of-life pastoral care. Family present at bedside and in anticipatory stage of grief. This  offered pastoral presence and prayer support. Spiritual care remains available as needed.

## 2021-12-29 NOTE — PROGRESS NOTES
12/29/21 0713   Vent Information   $Ventilation $Subsequent Day   Skin Assessment Clean, dry, & intact   Vent Type 980   Vent Mode AC/VC+   Vt Ordered 500 mL   Pressure Ordered 20   Rate Set 22 bmp   FiO2  100 %   Sensitivity 2   PEEP/CPAP 20   Humidification Source HME   Nitric Oxide/Epoprostenol In Use? No   Vent Patient Data   Peak Inspiratory Pressure 35 cmH2O   Mean Airway Pressure 27 cmH20   Rate Measured 22 br/min   Vt Exhaled 373 mL   Minute Volume 7.54 Liters   I:E Ratio 1.1.5   Plateau Pressure 24 UPM98   Static Compliance 27 mL/cmH2O   Total PEEP 20 cmH20   Auto PEEP 0 cmH20   Cough/Sputum   Sputum How Obtained Endotracheal;Suctioned   $Obtained Sample $Induced Sputum   Cough Productive   Frequency Infrequent   Sputum Amount Moderate   Sputum Color Dark red   Additional Respiratory  Assessments   Position Supine   Subglottic Suction Done?  Yes   Alarm Settings   Press Low Alarm 20 cmH2O   Delay Alarm 2.5 sec(s)   Low Minute Volume Alarm 20 L/min   Apnea (secs) 45 secs   High Respiratory Rate 45 br/min   Low Exhaled Vt  250 mL

## 2021-12-29 NOTE — PROGRESS NOTES
[place], Palliative care liaison, at bedside to speak with family about possible care options.  made decision to change pt to [facility]. Pt's brother expected to visit. Family expressed no further needs at this point.

## 2021-12-29 NOTE — PROGRESS NOTES
I was emergently called from the emergency department for chest tube placement in this patient who was in cardiopulmonary arrest with compressions in process and diagnosed with right tension pneumothorax based on chest x-ray. When I arrived in the room, compressions were in progress with resuscitative efforts ongoing. Needle decompression had been performed in the right midaxillary line without return of spontaneous circulation. Right-sided 21 Kiswahili chest tube was placed with return of spontaneous circulation. Cardiothoracic surgery had already been paged and currently in route to the hospital.    Procedure Note: Chest Tube  Indication: tension pneumothorax  Consent obtained: emergent  Procedure:   Patient was not prepped in a sterile manner as this was an emergent procedure. Procedure performed at fifth intercostal space, mid axillary line, on the right side. Approximately 4 cm incision made with 11 blade scalpel. Blunt dissection performed digitally and with hemostats. Pleural cavity accessed using hemostats. 20F chest tube placed angled posteriorly and superiorly. Secured with suture and dressed with occlusive Vaseline gauze and dressing. Patient had return of spontaneous circulation following chest tube placement.

## 2021-12-29 NOTE — PROGRESS NOTES
Pulmonary and Critical Care  Progress Note      VITALS:  BP 94/68   Pulse 110   Temp 97.5 °F (36.4 °C) (Axillary)   Resp 22   Ht 5' 5\" (1.651 m)   Wt 158 lb 4.6 oz (71.8 kg)   SpO2 (!) 60%   BMI 26.34 kg/m²     Subjective:   Chief complaint: Acute hypoxemic respiratory failure, severe sepsis, bilateral pneumonia secondary to COVID-19, atrial fibrillation, VDRF, cardiac arrest with ROSC  Remains orally intubated and on mechanical ventilation  Severe resp distress  Patient presently comatose but maintained on low-dose fentanyl. Currently on Levophed, Herminio-Synephrine and epinephrine for hypotension  Required right-sided chest tube insertion for pneumothorax after being prone yesterday    Objective:   PHYSICAL EXAM:    LUNGS: Diminished breath sounds bilaterally with a few scattered rhonchi  Right-sided chest tube in place  Remains on AC/VC plus, tidal volume 4 500, respiratory rate 22, 100% FiO2, PEEP 20 with arterial blood gases with pH 7.00, PCO2 95, PO2 36-probably venous   WBC 62.5, hemoglobin 11.3    DATA:    CBC:  Recent Labs     12/27/21  0529 12/28/21  0400 12/29/21  0640   WBC 25.7* 26.8* 62.5*   RBC 2.78* 3.28* 3.63*   HGB 8.6* 10.1* 11.3*   HCT 27.0* 31.5* 37.9    310 376   MCV 97.1 96.0 104.4*   MCH 30.9 30.8 31.1*   MCHC 31.9* 32.1 29.8*   RDW 13.2 13.1 13.8   SEGSPCT 70.0* 73.0*  --    BANDSPCT 18* 12*  --       BMP:  Recent Labs     12/27/21  0529 12/28/21  0400    135   K 4.5 4.6   CL 96* 96*   CO2 29 30   BUN 23 23   CREATININE 0.3* 0.3*   CALCIUM 8.6 8.8   GLUCOSE 176* 196*      ABG:  Recent Labs     12/28/21  0600 12/28/21  1600 12/29/21  0600   PH 7.34 7.28* 7.00*   PO2ART 72* 55* 36*   KCE1RFU 62.0* 67.0* 95.0*   O2SAT 91.1* 82.8* 49.9*     BNP  No results found for: BNP   D-Dimer:  Lab Results   Component Value Date    DDIMER 2100 (H) 12/28/2021      1. Radiology: Chest x-ray earlier this morning reviewed  1.  Interval placement of a right chest tube with decrease in size of the tension pneumothorax.  A moderate to large pneumothorax persists without   evidence of tension. 2. Other lines and tubes are stable. 3. Diffuse airspace opacities compatible with viral pneumonia. 4. New subcutaneous gas projecting over the chest     Chest x-ray this a.m. pending    Assessment:     Patient Active Problem List   Diagnosis    Cough variant asthma    Thyroid ca ([address]    Adjustment disorder with anxious mood    Tachycardia    Essential hypertension    Abnormal mammogram    Adjustment insomnia    Anxiety    Pneumonia due to COVID-19 virus    Acute respiratory failure with hypoxia ([address]       Plan:   1. continue present treatment  2. Palliative care has seen patient and discussion with family concerning limited CODE STATUS and possible terminal weaning from mechanical ventilation  3.  Prognosis extremely poor      Devonte Hugo MD MD  12/29/2021  9:51 AM

## 2021-12-29 NOTE — ACP (ADVANCE CARE PLANNING)
Patient's  and brother Chelo Cazares are outside of patient's room. Patient's mother has been able to visit with her daughter. Family has decided to allow nature to takes its course and withdrawal aggressive treatment. Patient's saturations remain in the 60's despite ventilator assist. BP has needed continued titration of LISA to try to sustain a map of 65. Notified  of discussion with family. Requested terminal extubation order set to be placed. Will await orders. Notified RT Victoria. I called the  earlier and they have been at bedside. Family has no further needs at this time. Will keep them updated.

## 2021-12-29 NOTE — CONSULTS
Department of Cardiovascular & Thoracic Surgery   Consult Note        Reason for Consult: Large right-sided pneumothorax with hemodynamic compromise  Requesting Physician:  Nancy Bettencourt MD        Date of Consult: 12/29/21    Chief Complaint: Hemodynamic compromise    History Obtained From:  electronic medical record, nurses at the bedside    HISTORY OF PRESENT ILLNESS:    The patient is a 45 y.o. female who presents with severe COVID-19 infection with initial diagnosis on 12/4/2021 and initially admitted on 12/9/2021. In review of the notes, the patient has been intubated for 12 days and was in the prone position on high PEEP. She had sudden worsening hypoxia hypotension. Chest x-ray was performed which showed a large pneumothorax. I was immediately called by the nurses responding to the code. In the meantime, the emergency room physician who was in house was also contacted to assist with placement of chest tube. Past Medical History:        Diagnosis Date    Abnormal mammogram 9/24/2019 9/24/19 : abnormal : repeat in 6 months. Pt was informed by radiologist     Cough variant asthma     COVID-19 virus infection 12/7/2021    Tested positive on 12/4/2021    Depression     resolved   Myriam Pap     Dr. Mike Riggins 12-     Pulmonary nodules     f/u by Dr. Maryan Cox. Benign. Last CT 1/12 stable middle lobe nodules.  Thyroid ca ([address] 12/15/2015    Had total thyroidectomy by Dr Gregor Owen. On synthroid. Managed by Dr Curt Jade.  Thyroid nodule     seen Dr. Curt Jade, kentrell left nodule was neg last seen 6/13 US q year Nodule increased in size per pt. He is planning to do bx next year Had thyroid nodule biopsy on 8/4/2015.   Nodule was 2.6 cm left inferior nodule Pt underwent total thyroidectomy in 9/25/15     Past Surgical History:        Procedure Laterality Date    THYROID SURGERY  9-25-15    total thyroidectomy     Current Medications:   Current Facility-Administered Medications: furosemide (LASIX) injection 20 mg, 20 mg, IntraVENous, Once  EPINEPHrine (EPINEPHrine HCL) 5 mg in dextrose 5 % 250 mL infusion, 1-30 mcg/min, IntraVENous, Continuous  midazolam (VERSED) 100 mg in dextrose 5 % 100 mL infusion, 2 mg/hr, IntraVENous, Once  norepinephrine (LEVOPHED) 16 mg in sodium chloride 0.9 % 250 mL infusion, 2-100 mcg/min, IntraVENous, Continuous  cefepime (MAXIPIME) 2000 mg IVPB minibag, 2,000 mg, IntraVENous, Q8H  baricitinib (OLUMIANT) tablet 4 mg, 4 mg, Oral, Daily  midazolam (VERSED) 100 mg in dextrose 5 % 100 mL infusion, 1-10 mg/hr, IntraVENous, Continuous  midodrine (PROAMATINE) tablet 5 mg, 5 mg, Oral, TID  enoxaparin (LOVENOX) injection 70 mg, 1 mg/kg, SubCUTAneous, BID  chlorhexidine (PERIDEX) 0.12 % solution 15 mL, 15 mL, Mouth/Throat, BID  famotidine (PEPCID) injection 20 mg, 20 mg, IntraVENous, BID  fentaNYL (SUBLIMAZE) 1,000 mcg in sodium chloride 0.9% 100 mL infusion, 12.5-200 mcg/hr, IntraVENous, Continuous  propofol injection, 5-80 mcg/kg/min, IntraVENous, Continuous  lidocaine (XYLOCAINE) 2 % jelly, , Topical, Once  albuterol sulfate  (90 Base) MCG/ACT inhaler 4 puff, 4 puff, Inhalation, Q4H **AND** ipratropium (ATROVENT HFA) 17 MCG/ACT inhaler 4 puff, 4 puff, Inhalation, Q4H  sodium chloride flush 0.9 % injection 5-40 mL, 5-40 mL, IntraVENous, 2 times per day  sodium chloride flush 0.9 % injection 5-40 mL, 5-40 mL, IntraVENous, PRN  0.9 % sodium chloride infusion, 25 mL, IntraVENous, PRN  vecuronium (NORCURON) 100 mg in sodium chloride 0.9 % 100 mL infusion, 0.5-1.7 mcg/kg/min, IntraVENous, Continuous  sodium chloride (OCEAN, BABY AYR) 0.65 % nasal spray 1 spray, 1 spray, Each Nostril, Q2H PRN  ondansetron (ZOFRAN-ODT) disintegrating tablet 4 mg, 4 mg, Oral, Q8H PRN **OR** ondansetron (ZOFRAN) injection 4 mg, 4 mg, IntraVENous, Q6H PRN  polyethylene glycol (GLYCOLAX) packet 17 g, 17 g, Oral, Daily PRN  acetaminophen (TYLENOL) tablet 650 mg, 650 mg, Oral, Q6H PRN **OR** acetaminophen (TYLENOL) suppository 650 mg, 650 mg, Rectal, Q6H PRN  guaiFENesin-dextromethorphan (ROBITUSSIN DM) 100-10 MG/5ML syrup 5 mL, 5 mL, Oral, Q4H PRN  vitamin D CAPS 2,000 Units, 2,000 Units, Oral, Daily  busPIRone (BUSPAR) tablet 5 mg, 5 mg, Oral, BID  levothyroxine (SYNTHROID) tablet 125 mcg, 125 mcg, Oral, Daily  montelukast (SINGULAIR) tablet 10 mg, 10 mg, Oral, Nightly  traZODone (DESYREL) tablet 50 mg, 50 mg, Oral, Nightly PRN  Allergies:  Patient has no known allergies. Social History:   Social History     Socioeconomic History    Marital status:      Spouse name: Not on file    Number of children: Not on file    Years of education: Not on file    Highest education level: Not on file   Occupational History    Not on file   Tobacco Use    Smoking status: Never Smoker    Smokeless tobacco: Never Used   Substance and Sexual Activity    Alcohol use: Yes     Alcohol/week: 0.0 standard drinks    Drug use: No    Sexual activity: Yes     Partners: Male   Other Topics Concern    Not on file   Social History Narrative    Not on file     Social Determinants of Health     Financial Resource Strain:     Difficulty of Paying Living Expenses: Not on file   Food Insecurity:     Worried About Running Out of Food in the Last Year: Not on file    Iggi of Food in the Last Year: Not on file   Transportation Needs:     Lack of Transportation (Medical): Not on file    Lack of Transportation (Non-Medical):  Not on file   Physical Activity:     Days of Exercise per Week: Not on file    Minutes of Exercise per Session: Not on file   Stress:     Feeling of Stress : Not on file   Social Connections:     Frequency of Communication with Friends and Family: Not on file    Frequency of Social Gatherings with Friends and Family: Not on file    Attends [employer] Services: Not on file    Active Member of Clubs or Organizations: Not on file    Attends Club or Organization Meetings: Not on file    Marital Status: Not on file   Intimate Partner Violence:     Fear of Current or Ex-Partner: Not on file    Emotionally Abused: Not on file    Physically Abused: Not on file    Sexually Abused: Not on file   Housing Stability:     Unable to Pay for Housing in the Last Year: Not on file    Number of [place] in the Last Year: Not on file    Unstable Housing in the Last Year: Not on file     Family History:  History reviewed. No pertinent family history. REVIEW OF SYSTEMS: Unable to be performed due to the critical nature of the consultation  CONSTITUTIONAL:  Neg. EYES:  Neg. EARS:  Neg     NOSE:  Neg.    MOUTH/THROAT:  Neg.    RESPIRATORY:   Current COVID-19 infection  CARDIOVASCULAR   Neg. GASTROINTESTINAL:  Neg. GENITOURINARY:  Neg.    HEMATOLOGIC/LYMPHATIC:  Neg.    MUSCULOSKELETAL:  Neg. NEUROLOGICAL:  Neg. SKIN :  Neg. PSYCHIATRIC:  Neg   ENDOCRINE: History of thyroid disease  ALL/IMM :  Neg. PHYSICAL EXAM:  VITALS:  /69   Pulse 114   Temp 100.2 °F (37.9 °C) (Rectal)   Resp 22   Ht 5' 5\" (1.651 m)   Wt 158 lb 4.6 oz (71.8 kg)   SpO2 (!) 88%   BMI 26.34 kg/m²   CONSTITUTIONAL: Sedated and intubated and hypoxic   NECK:  Supple, symmetrical, trachea midline, no adenopathy, thyroid symmetric, not enlarged and no tenderness, skin normal  HEMATOLOGIC/LYMPHATICS:  no cervical lymphadenopathy and no supraclavicular lymphadenopathy  LUNGS: Rhonchi throughout   CARDIOVASCULAR:  Normal apical impulse, regular rate and rhythm, normal S1 and S2, no S3 or S4, and no murmur noted  CHEST/BREASTS:  Symmetric, obvious asymmetric subcutaneous emphysema  ABDOMEN: soft nt nd, no pulsitile masses  MUSCULOSKELETAL:  There is no redness, warmth, or swelling of the joints. Full range of motion noted. Motor strength is 5 out of 5 all extremities bilaterally.   Tone is normal.  NEUROLOGIC: Intubated and sedated SKIN:  no bruising or bleeding and normal skin color, texture, turgor  VASC: 1+ femoral pulses    Chest tube is in place, there is a large air leak that improves with pressure at the incision site      DATA:  Prior chest x-ray shows large pneumothorax and most recent chest x-ray shows resolution of his pneumothorax though there is subcutaneous emphysema and is difficult to completely assess    IMPRESSION  Active Hospital Problems    Diagnosis Date Noted    Acute respiratory failure with hypoxia ([facility] [J96.01] 12/09/2021    Pneumonia due to COVID-19 virus [U07.1, J12.82] 12/07/2021       Tension pneumothorax      RECOMMENDATIONS:    I did a few sutures at the chest tube site to try to close the incision due to the air leak. This improved the situation slightly but we will put a pressure dressing on it. Patient remains hypoxic and on vasopressor support so I will place an arterial line. This was performed in an urgent basis consent was unable to be obtained.       Electronically signed by Daryle Alice, MD on 12/29/2021 at 3:54 AM

## 2021-12-29 NOTE — PROGRESS NOTES
12/29/21 1145   Vent Information   Skin Assessment Clean, dry, & intact   Equipment Changed Suction catheter   Vent Type 980   Vent Mode AC/VC   Vt Ordered 500 mL   Pressure Ordered 0   Rate Set 22 bmp   FiO2  100 %   PEEP/CPAP 20   Vent Patient Data   High Peep/I Pressure 45   Peak Inspiratory Pressure 35 cmH2O   Mean Airway Pressure 27 cmH20   Rate Measured 22 br/min   Vt Exhaled 380 mL   Spontaneous  mL   Minute Volume 8.54 Liters   I:E Ratio 1.1.5   Plateau Pressure 24 RSY21   Cough/Sputum   Sputum How Obtained Endotracheal;Suctioned   $Obtained Sample $Induced Sputum   Cough Productive   Frequency Infrequent   Sputum Amount Moderate   Sputum Color Dark red   Spontaneous Breathing Trial (SBT) RT Doc   Pulse 105   RSBI Calculated 57.89   Additional Respiratory  Assessments   Resp 22   Position Supine   Subglottic Suction Done?  Yes

## 2021-12-29 NOTE — PROGRESS NOTES
Message sent to hospitalist requesting another pressor d/t pt BP dropping.     Electronically signed by Amber Muhammad RN on 12/29/2021 at 4:14 AM

## 2021-12-29 NOTE — SIGNIFICANT EVENT
Patient with large right-sided tension pneumothorax. Notified cardiothoracic surgeon immediately. Patient coded during this time. CODE BLUE was called and was undertaken. Please see significant event note for further details regarding that event. Told nurse to immediately call ED provider to provide chest thoracotomy. In the meantime I prepped the patient with Betadine and placed a 16-gauge 5 cm needle anterior to the fifth rib at the anterior axillary line with a pop felt and immediate rush of air obtained. Saturations slightly improved. No Angiocath was available immediately. Dressed patient with Tegaderm with makeshift flutter valve. Immediately after dressing was placed ED provider arrive at bedside to view chest thoracotomy. Please see his note for further detail.     Electronically signed by Maru Wallace DO on 12/29/2021 at 3:33 AM

## 2021-12-29 NOTE — SIGNIFICANT EVENT
Rapid Response Team Note    Date of event: 12/29/2021   Time of event: [address] [facility] 45y.o. year old female   YOB: 1970   Admit date:  12/8/2021   Location: 2018/2018-A   Witnessed? : [x]Yes  [] No  Monitored? : [x]Yes  [] No  Code status: [x] Full  [] DNR-CCA  []DNR-CC  ______________________________________________________________________  Reason for RRT:    [] RR < 8     [x] RR > 28   [x] SBP < 90 mmHg    [] HR < 40 bpm   [] HR > 130 bpm  [] Seizures    [x] SpO2 <90%   [] LOC   [] Code Stroke  [] Significant Bleeding Event    [] Other:     Subjective:   CTSP regarding the above event, patient with worsening respiratory failure and hemodynamic instability after being proned. Objective:   Vital signs: Temp: 100.2 BP: 69/49 RR: 22 HR: 107    Initial Condition:  Conscious   [] Yes  [x] No     Breathing [x] Yes  [] No     Pulse  [x] Yes  [] No    Airway:   [x] Open/ Clear     Intervention: [] None  [] Pooled secretions     [] Suctioned  [] Stridor      [x] continue intubation    Lungs:   [x] Symmetrical chest rise/ CTABL Intervention: [] None  [] Use of accessory muscles    [] NIV (CPAP/BiPAP)  [x] Cyanosis      [] Nasal Oxygen/Mask  [] Wheezing       [] ABG             [x] CXR  [] Other:     Circulation:   Rhythm:      Intervention: [x] None      [] Sinus      [] IV Access  [] Peripheral      [x] Other: Sinus tachycardia     [] Central            [] EKG            [] Cardioversion            [] Defibrillation       Capillary Refill:  [x] > 2 seconds [] < 2 seconds    ABG:      Lab Results   Component Value Date    PH 7.28 12/28/2021    O2SAT 82.8 12/28/2021       Medication(s) Given:  []  Narcan (Naloxone)   []  Romazicon (Flumazenil)    []  Breathing Treatment    []  Steroids (Prednisone, Solu-Medrol)  []  Adenosine  [x] Cardiac Medicines: Multiple cardiac medications given. Please see code timeline.     Infusion(s):   [] Normal Saline    Amount:  cc/hr      [] Lactate Ringers      [] Other:     Imaging:   [x] CXR:   [] Normal         [x] Pneumothorax         [] Pulmonary Edema  [] Infiltrate          [] CT Head  [] Normal          [] ICB          [] SAH          [] Other:          [] CTA Pulmonary [] Normal          [] Pulmonary embolus          [] Other:           Teams Assessment and Plan:  1. Respiratory failure  2. Hypotension and tachycardia  3. Tension pneumothorax, right s/p chest tube  4. Cardiac arrest status post ROSC  a. This is a critically ill patient with COVID-19 who is unstable and I was called to bedside. Patient requiring increased dosing of Levophed. Patient with map less than 65. Required immediate bedside push dose epinephrine to keep patient's map greater than 65. Required increased Levophed dosing as well. Patient with declining oxygen saturations. Needing to be manually bagged. Breath sounds noted on both sides initially. Prior to being called for rapid patient was being proned. PEEP recently increased to 20 by pulmonology prior to rapid response being called. Stat CXR was ordered. Patient noted to have right-sided tension pneumothorax. Cardiothoracic surgery was immediately consulted. Patient declined and went into cardiac arrest.  Needle decompression was done at bedside. Please see my procedure note for further detail. Patient required multiple doses of epinephrine and other cardiac medications during code. Code was ran with high-quality protocol driven ACLS. Chest tube was emergently placed graciously by ED provider. ROSC was obtained. Cardiothoracic surgeon was at bedside and repairing chest tube. Patient remains in critical condition. ?   Disposition:  [x] No transfer   [] Transfer to monitor floor  [] Transfer to: [] SDU [] ICU    Patients family updated:  [x] Yes  [] No       ?    Cordelia Pacheco DO   3:48 AM  Attending Marguerita Phalen, MD    Due to the immediate potential for life-threatening deterioration due to right-sided tension pneumothorax and cardiac arrest, I spent 120 minutes providing critical care. This time is excluding time spent performing procedures.

## 2021-12-29 NOTE — PROGRESS NOTES
Pt was placed prone d/t sats dropping into the 60s. RT bagging patient. Pt sats dropped while being prone, attempted repositioning. Pt turned back supine, sats still in the 70s. PEEP increased to 20. Dr. Rod June notified. Gave verbal orders for 20mg lasix iv. RRT called, john and celi at bedside. Levo started at 30 mcg at this time per [facility] NP. 1 L NS bolus started at this time. BP 54/27 (35)      0156: Levo increased to 40 at this time per [facility] NP. Dr. Tegan Cardenas administering 20mcg of epinephrine at this time IVP. BP 73/58 (65)    0158: Celi NP gave verbal orders for KUB and chest xray for ETT placement and abdominal distention. Dr. Tegan Cardenas gave 20mcg of epinephrine IVP at this time. Holly HENDERSON called and updated pt  at this time. 0159: Dr. Tegan Cardenas gave verbal orders for Epi gtt to be started at 5 mcg at this time. Pharmacy called. 0200: Dr. Tegan Cardenas administering 40 mcg of epinephrine IVP at this time. BP 46/29 (35) HR 84  SPO2 85%    0201: Levo gtt increased to 50mcg at this time per Dr. Tegan Cardenas. 0202: Levo gtt increased to 60mcg at this time per Dr. Tegan Cardenas. Dr. Tegan Cardenas administered 30mcg of epinephrine at this time IVP. BP 54/32 (37) HR 87 spo2 81%    0204: Dr. Tegan Cardenas administered 40 mcg of epinephrine IVP at this time. BP 79/57 (65)    0206: :Levo gtt increased to 70mcg per Dr. Tegan Cardenas. BP 79/65 (71)    0207: Dr. Tegan Cardenas administering 20 mcg of Epinephrine at this time IVP.     0209: Levo gtt increased to 80mcg per Dr. Tegan Cardenas. BP 72/36 (48). Dr. Tegan Cardenas administered 40mcg of Epinephrine IVP at this time. 0211: Epinephrine gtt started at 5mcg at this time. 6325: Pt connected to pads and ZOLL at this time. 0214: Epinephrine gtt increased to 10mcg at this time per Dr. Tegan Cardenas. BP 68/46 (52)    72145: 75/54 (72)    0219: 300mg Amiodarone administered at this time IVP per Dr. Tegan Cardenas. 0220: Dr. Tegan Cardenas confirmed pt has tension pneumo on chest xray.  Dr. Dane Whitfield notified by kedar HENDERSON. Epi gtt increased to 15mcg per Dr. Lan Braswell. HR 54. BP 70/59 (65).     Humberto Wesley RN

## 2021-12-29 NOTE — PROGRESS NOTES
Terminal extubation Per family request from mechanical vent. Pt extubated and placed on [facility] for comfort family at bedside .

## 2021-12-29 NOTE — PROGRESS NOTES
Death Pronouncement Note    Patient's Name: Sneha Mariee   Patient's YOB: 1970  MRN Number: [id number]    Admitting Provider: Anushka Kelly MD  Attending Provider: Jose Perkins MD    The , Dio Galdamez, made an informed decision and requested to be removed from life support knowing that the patient will most likely not survive off life support. Patient was given appropriate medications as needed to keep the patient comfortable before, during, and after removal of life support. Life support was removed on 12/29/2021 at 1320. Patient lost pulses, blood pressure, and respiratory effort on 12/29/2021 at 1332. Patient was observed for at least 5 minutes during the observation period never regained pulses, blood pressure, or respiratory effort.     I declared the patient dead on 12/29/2021 at 1332    Preliminary Cause of Death: Covid-19    Electronically signed by Jean Callaway RN on 12/29/21 at 2:03 PM EST

## 2021-12-29 NOTE — PROGRESS NOTES
Dr. Juan Persaud at bedside speaking to family about pt's current status. Plan is to allow family to visit and speak with palliative care liaison.

## 2021-12-30 NOTE — ADT AUTH CERT
Pneumonia - Care Day 20 (12/28/2021) by Piter Linton RN       Review Status Review Entered   Completed 12/30/2021 10:24      Criteria Review      Care Day: 20 Care Date: 12/28/2021 Level of Care: ICU    Guideline Day 2    Clinical Status    (X) * No CO2 retention or acidosis    ( ) * No requirement for mechanical ventilation    12/30/2021 10:24 AM EST by Bucky Box      Remains orally intubated and on mechanical ventilation-day 12    (X) * Hypotension absent    12/30/2021 10:24 AM EST by Bucky Box      /69   Pulse 112   Temp 100.9 °F (38.3 °C) (Rectal) Comment: cooling blanket remains in place  Resp 25   Ht 5' 5\" (1.651 m)   Wt 158 lb 4.6 oz (71.8 kg)   SpO2 91%    ( ) * Afebrile or fever improved    12/30/2021 10:24 AM EST by Bucky Box      temp 100.7    ( ) * No hypoxia on room air or oxygenation improved    ( ) * Mental status improved or at baseline    12/30/2021 10:24 AM EST by Bucky Box      Patient on vent sedation with propofol, Versed and fentanyl    Activity    ( ) * Increased activity    Routes    ( ) Oral medications    12/30/2021 10:24 AM EST by Bucky Box      fentanyl iv gtt, versed iv gtt, propofol iv gtt, norcuron iv gtt, alb/atrovent x4, olumiant po x1, buspar po x2, maixpime iv x3, lovenox sc x2, pepcid iv x2, synthroid po x1, proamatine po x3, singulair po x1,  tylenol po x2    (X) Usual diet    Interventions    (X) Pulse oximetry    (X) Head of bed at 30 degrees    (X) Possible oxygen    Medications    (X) IV or oral antibiotics    * Milestone   Additional Notes   DATE: 12/28, day 20 ICU       Pertinent Updates:   Mild to moderate resp distress   Patient on vent sedation with propofol, Versed and fentanyl   Still having episodes of cough and mild endotracheal secretions suctioned by nursing staff   Tube feeding at goal at 45 cc an hour   cooling blanket remains in place       Physical Exam:   LUNGS: Scattered rhonchi throughout all lung fields with increased secretions noted on endotracheal suction      Abnl/Pertinent Labs/Radiology/Diagnostic Studies:   Remains on AC/PC 22/15, 70% FiO2, 10 of PEEP with arterial pH 7.34, PCO2 62, PO2 72 and O2 saturation 91%   Serum sodium 135, potassium 4.6, creatinine 0.3 with BUN 23   WBC 26.8 with hemoglobin 10.1 g-left shift in differential   Continues to have low-grade temp      CXR:    Decreased lung volumes.  Increased airspace opacities in the right lung may    represent progressed pneumonia, may be due in part to the reduced lung volume    and bronchovascular crowding. MD Consults/Assessments & Plans:   PULM:      Assessment:       Patient Active Problem List   Diagnosis   · Cough variant asthma   · Thyroid ca (HCC)   · Adjustment disorder with anxious mood   · Tachycardia   · Essential hypertension   · Abnormal mammogram   · Adjustment insomnia   · Anxiety   · Pneumonia due to COVID-19 virus   · Acute respiratory failure with hypoxia ([facility] [address]           Plan:   1. continue present treatment   2. Remains on IV cefepime for secondary pneumonic process.    3. Need to start considering tracheostomy with PEG tube placement in the near future      Addendum:   was emergently called from the emergency department for chest tube placement in this patient who was in cardiopulmonary arrest with compressions in process and diagnosed with right tension pneumothorax based on chest x-ray.  When I arrived in the room, compressions were in progress with resuscitative efforts ongoing.  Needle decompression had been performed in the right midaxillary line without return of spontaneous circulation.  Right-sided 21 Kinyarwanda chest tube was placed with return of spontaneous circulation.  Cardiothoracic surgery had already been paged and currently in route to the hospital.       Procedure Note: Chest Tube   Indication: tension pneumothorax   Consent obtained: emergent   Procedure:    Patient was not prepped in a sterile manner as this was an emergent procedure.  Procedure performed at fifth intercostal space, mid axillary line, on the right side. Approximately 4 cm incision made with 11 blade scalpel.  Blunt dissection performed digitally and with hemostats.  Pleural cavity accessed using hemostats.  20F chest tube placed angled posteriorly and superiorly.  Secured with suture and dressed with occlusive Vaseline gauze and dressing. Jennifer Bolanos had return of spontaneous circulation following chest tube placement.           Pneumonia - Care Day 19 (12/27/2021) by Piter Linton RN       Review Status Review Entered   Completed 12/30/2021 10:15      Criteria Review      Care Day: 19 Care Date: 12/27/2021 Level of Care: ICU    Guideline Day 2    Clinical Status    (X) * No CO2 retention or acidosis    ( ) * No requirement for mechanical ventilation    12/30/2021 10:15 AM EST by Goalbook      Patient remains orally intubated and on mechanical ventilation    (X) * Hypotension absent    12/30/2021 10:15 AM EST by Goalbook      VITALS:  /82   Pulse 102   Temp 101.9 °F (38.8 °C) (Rectal)   Resp 26   Ht 5' 5\" (1.651 m)   Wt 158 lb 4.6 oz (71.8 kg)   SpO2 96%    ( ) * Afebrile or fever improved    12/30/2021 10:15 AM EST by Goalbook      temp 101.7    ( ) * No hypoxia on room air or oxygenation improved    ( ) * Mental status improved or at baseline    Activity    ( ) * Increased activity    Routes    ( ) Oral medications    12/30/2021 10:15 AM EST by Goalbook      iv fentanyl iv gtt, versed iv gtt, propofol iv gtt, tyelnol po x1, alb/atrovent x6, olumiant po x1, buspar po x2, maxipime iv x3, decadron iv x1, cardizem po x2, lovenox sc x2, pepcid iv x2, synthroid po x1, proamatine po x1, singulair po x1, vit d    (X) Usual diet    Interventions    (X) Pulse oximetry    (X) Head of bed at 30 degrees    (X) Possible oxygen    Medications    (X) IV or oral antibiotics    * Milestone   Additional Notes   DATE: 12/27, day 19 ICU       Pertinent Updates:   Patient remains orally intubated and on mechanical ventilation   Mild to moderate resp distress   Patient on ventilator sedation with propofol and fentanyl. Not requiring IV pressors.  Having episodes of coughing      Physical Exam:   GEN     female, Yannicka Yuri in bed in no apparent distress. Appears given age. EYES   Pupils are equally round.  No scleral erythema, discharge, or conjunctivitis. HENT  Mucous membranes are moist. Oral pharynx without exudates, no evidence of thrush.  Endotracheal tube in situ   NECK  Supple, no apparent thyromegaly or masses. RESP  Clear to auscultation, no wheezes, rales or rhonchi.  Symmetric chest movement while on room air. CARDIO/VASC           S1/S2 auscultated. Regular rate without appreciable murmurs, rubs, or gallops. No JVD or carotid bruits. Peripheral pulses equal bilaterally and palpable. No peripheral edema. GI        Abdomen is soft without significant tenderness, masses, or guarding. Bowel sounds are normoactive. Rectal exam deferred.        No costovertebral angle tenderness. Normal appearing external genitalia. Tirado catheter is not present. HEME/LYMPH            No palpable cervical lymphadenopathy and no hepatosplenomegaly. No petechiae or ecchymoses. MSK    No gross joint deformities. SKIN    Normal coloration, warm, dry. NEURO           sedated. PSYCH            sedated      Abnl/Pertinent Labs/Radiology/Diagnostic Studies:   Wbc 25.7, hgb 8.6, hct 27.0, bands+ 18, cl 96, creat 0.3, gluc 176,    ABG: ph 7.42, po2 102, pco2 47.0       MD Consults/Assessments & Plans:   PULM:   Assessment:       Patient Active Problem List   Diagnosis   · Cough variant asthma   · Thyroid ca (HCC)   · Adjustment disorder with anxious mood   · Tachycardia   · Essential hypertension   · Abnormal mammogram   · Adjustment insomnia   · Anxiety   · Pneumonia due to COVID-19 virus   · Acute respiratory failure with hypoxia (HCC)           Plan:   1. continue present treatment   2. Currently on baricitinib, IV Decadron, inhaled bronchodilators. 3. Awaiting respiratory therapy notation on SBT trials      CARDIO:   PLAN FROM CARDIOLOGY FOR TODAY:   CPM , no strips of a fib       - cardiology consult is for:  A fib       -  Interval history:  In sr       · ASSESSMENT/ PLAN:   1.   ? A fib in SR  Now, no strips with a fib, ST only   2. Normal EF   3. BP stable on midodrine      IM:   Assessment and Plan:   Mary Quiñones is a 45 y.o. Mathew Begin presents with <principal problem not specified>       Severe sepsis secondary to COVID-19 pneumonia with acute hypoxic respiratory failure as endorgan defect   Acute on chronic COPD           On admission patient was on high flow oxygen,    placed on BiPAP, CT chest did not show any PE, patchy opacities bilaterally,    procal low, CRP elevated,    Pulmonology consult   Repeat x-ray   S/p toci, monoclonal antibodies    Continue dexamethasone and PPI   Baricitinib       Was intubated 12/16   Vent management per pulmonology   Vancomycin and cefepime day 6   DC after 7-day course   She is on 65% FiO2, with PEEP of 8 and  saturating around 96%. -Continue current measures.           Mood disorder: Buspirone       Hypothyroidism: Levothyroxine       A. fib: Continue diltiazem infusion   -Continue Lovenox full therapeutic dose.       Appreciate cardiology input       DVT prophlaxis:   *Treatment dose Lovenox           Pneumonia - Care Day 18 (12/26/2021) by Chadwick Reyes, RN       Review Status Review Entered   Completed 12/30/2021 10:06      Criteria Review      Care Day: 18 Care Date: 12/26/2021 Level of Care: ICU    Guideline Day 2    Clinical Status    (X) * No CO2 retention or acidosis    ( ) * No requirement for mechanical ventilation    12/30/2021 10:06 AM EST by Ella Ruffin      on the vent and sedated. She has minimal response to painful stimuli.  She failed the vent wean today    (X) * Hypotension absent 12/30/2021 10:06 AM EST by Ni Mercer      /76   Pulse 94   Temp 99.2 °F (37.3 °C) (Rectal)   Resp (!) 51   Ht 5' 5\" (1.651 m)   Wt 158 lb 4.6 oz (71.8 kg)   SpO2 95%    (X) * Afebrile or fever improved    ( ) * No hypoxia on room air or oxygenation improved    ( ) * Mental status improved or at baseline    Activity    ( ) * Increased activity    Routes    ( ) Oral medications    12/30/2021 10:06 AM EST by Ni Mercer      cardizem iv gtt, fentanyl iv, versed iv, iv propofol gtt, alb/atrovent x7, olumiant po x1, buspar po x2, maxipime iv x3, decadron iv x1, lovenox sc x2, pepcid iv x2, synthroid po x1, proamatine po x3, singulair po x1, vit d po x1    (X) Usual diet    Interventions    (X) Pulse oximetry    (X) Head of bed at 30 degrees    (X) Possible oxygen    Medications    (X) IV or oral antibiotics    * Milestone   Additional Notes   DATE: 12/26, day 18 ICU       Pertinent Updates:   She is on multiple sedation. .  .  She is requiring 65%% FiO2.  She did not tolerate breathing trial on 12/23/2021. Tanna [place] her high FiO2 today.  She will not be a candidate for any weaning trials.  Pulmonary and cardiology notes reviewed.  Labs reviewed. NATALIE K[facility]Marcel[facility] chest from today has been reviewed as well.  Continue current measures overall prognosis very guarded. Physical Exam:   LUNGS:Occasional basal crackles    Abd-soft, BS+,NT   Ext- no pedal edema   CVS-s1s2, no murmurs      Abnl/Pertinent Labs/Radiology/Diagnostic Studies:   Wbc 20.9, hgb 9.6, hct 30.2, cl 98, creat 0.8, gluc 157    ABG: ph 7.38, pco2 50.0,       MD Consults/Assessments & Plans:   PULM:  Acute Hypoxic resp failure sec to Cardiogenic and non cardiogenic Pulmonary edema   Bilateral Pneumonia sec to COVID-19   Sec. Hypothryroidism   Anxiety   Afib rate controlled   VDRF           1. Decadron   2. Insulin   3. Inhalers   4. Barcitinib   5. Keep sats > 88%   6. Tube feeds   7. PT/OT   8. Daily SAT and SBT trials   9. CXR in am   10.  C.w Molly Lights      cardizem iv gtt, iv versed gtt, iv propofol gtt, iv fentanyl gtt, tylenol po x1, alb/atrovent x5, olumiant po x1, buspar po x2, maxipime iv x3, decadron iv x1, lovenox sc x2, pepcid iv x2, synthroid po x1, proamatine po x3,    (X) Usual diet    Interventions    (X) Pulse oximetry    (X) Head of bed at 30 degrees    (X) Possible oxygen    Medications    (X) IV or oral antibiotics    * Milestone   Additional Notes   DATE: 12/25, day 17 ICU       Pertinent Updates:   She is on multiple sedation. .  .  She is [id number]% FiO2.  She did not tolerate breathing trial on 12/23/2021. Lorrine Cluster her high FiO2 today.  She will not be a candidate for any weaning trials.  Pulmonary and cardiology notes reviewed.  Labs reviewed. Natalia Alvaradomina x-ray chest from today has been reviewed as well.  Continue current measures overall prognosis very guarded. Physical Exam:   LUNGS:Occasional basal crackles    Abd-soft, BS+,NT   Ext- no pedal edema   CVS-s1s2, no murmurs      Abnl/Pertinent Labs/Radiology/Diagnostic Studies:   Wbc 24.6, hgb 10.0, hct 31.0, bands+ 18, cl 98, creat 0.3, gluc 150,       CXR: Persistent bilateral airspace disease compatible with COVID-19 pneumonia      MD Consults/Assessments & Plans:   PULM:   Acute Hypoxic resp failure sec to Cardiogenic and non cardiogenic Pulmonary edema   Bilateral Pneumonia sec to COVID-19   Sec. Hypothryroidism   Anxiety   Afib rate controlled   VDRF           1. Barcitinib   2. Decadron   3. Insulin   4. Inhalers   5. Keep sats > 88%   6. Daily SAT and SBT trial   7. Tube feeds   8. PT/OT   9. CXR in am   10.  C.w present management      IM:    Assessment and Plan:   Lacie White is a 45 y.o. Harlen Shows presents with <principal problem not specified>       Severe sepsis secondary to COVID-19 pneumonia with acute hypoxic respiratory failure as endorgan defect   Acute on chronic COPD           On admission patient was on high flow oxygen,    placed on BiPAP, CT chest did not show oxygen    Medications    (X) IV or oral antibiotics    * Milestone   Additional Notes   DATE: 12/24 day 16 ICU      Pertinent Updates:   Cari Garay is on multiple sedation. .  .  She is [id number]% FiO2.  She did not tolerate breathing trial on 12/23/2021. Lucero Estrellaer her high FiO2 today.  She will not be a candidate for any weaning trials.  Pulmonary and cardiology notes reviewed.  Labs reviewed. Deb Ceballos x-ray chest from today has been reviewed as well.  Continue current measures overall prognosis very guarded. Physical Exam:   LUNGS:Occasional basal crackles    Abd-soft, BS+,NT   Ext- no pedal edema   CVS-s1s2, no murmurs      Abnl/Pertinent Labs/Radiology/Diagnostic Studies:   Wbc 23.9, hgb 9.7, hct 30.2, na 132, cl 98, bun 25, creat 0.4, ca 8.0,       MD Consults/Assessments & Plans:   Assessment and Plan:   Ramiro Mcnamara is a 45 y.o. Buzz Dubonnet presents with <principal problem not specified>       Severe sepsis secondary to COVID-19 pneumonia with acute hypoxic respiratory failure as endorgan defect   Acute on chronic COPD           On admission patient was on high flow oxygen,    placed on BiPAP, CT chest did not show any PE, patchy opacities bilaterally,    procal low, CRP elevated,    Pulmonology consult       S/p toci, monoclonal antibodies    Continue dexamethasone and PPI   Baricitinib       Was intubated 12/16   Vent management per pulmonology   Vancomycin and cefepime day 6   DC after 7-day course   She is on 70% FiO2, with PEEP of 8 and barely saturating around 90.   -Continue current measures.           Mood disorder: Buspirone       Hypothyroidism: Levothyroxine       A. fib: Continue diltiazem infusion   -Continue Lovenox full therapeutic dose.       Appreciate cardiology input       DVT prophlaxis:   *Treatment dose Lovenox      PULM:   Acute Hypoxic resp failure sec to Cardiogenic and non cardiogenic Pulmonary edema   Bilateral Pneumonia sec to COVID-19   Sec.  Hypothryroidism   Anxiety   Afib rate controlled   VDRF           1. ABX   2. F/U c&s   3. Keep sats > 88%   4. Tube feeds   5. Decadron   6. Insulin   7. Inhalers   8. Barcitinib   9. Daily SAT and SBT trials   10. PT/OT   11. CXR in am   15.  C.w present management

## 2022-01-27 NOTE — DISCHARGE SUMMARY
Discharge Summary    Name:  Giovana Morton /Age/Sex: 1970  (45 y.o. female)   MRN & CSN:  [id number] & [id number] Admission Date/Time: 2021  7:44 PM   Attending:  No att. providers found Discharging Physician: Timothy Daley MD     Hospital Course:   Giovana Morton is a 45 y.o.  female  who presents with     Severe sepsis secondary to COVID-19 pneumonia with acute hypoxic respiratory failure as endorgan defect  Acute on chronic COPD     On admission patient was on high flow oxygen,   placed on BiPAP, CT chest did not show any PE, patchy opacities bilaterally,   procal low, CRP elevated,   Pulmonology consult  Repeat x-ray  S/p toci, monoclonal antibodies   Continue dexamethasone and PPI  Baricitinib     Was intubated   Vent management per pulmonology  Vancomycin and cefepime day 6  DC after 7-day course  She is on 65% FiO2, with PEEP of 8 and  saturating around 96%. -Continue current measures.     Mood disorder: Buspirone     Hypothyroidism: Levothyroxine     A. fib: Continue diltiazem infusion  -Continue Lovenox full therapeutic dose.     Appreciate cardiology input       Dispo: Very poor prognosis, discussed with palliative care nurse  Patient .      DVT prophlaxis:   *Treatment dose Lovenox    Consults this admission:  IP CONSULT TO HOSPITALIST  IP CONSULT TO PULMONOLOGY  IP CONSULT TO CARDIOLOGY  IP CONSULT TO INFECTIOUS DISEASES  PHARMACY TO DOSE VANCOMYCIN  IP CONSULT TO DIETITIAN  IP CONSULT TO PHARMACY  IP CONSULT TO IV TEAM  IP CONSULT TO CARDIOLOGY  IP CONSULT TO [address] [place] [address]    Discharge Instruction:   N/A Patient     Discharge Medications:        Medication List      ASK your doctor about these medications    albuterol sulfate  (90 Base) MCG/ACT inhaler  Commonly known as: ProAir HFA  TAKE 2 PUFFS BY MOUTH EVERY 6 HOURS AS NEEDED FOR WHEEZE     budesonide-formoterol 160-4.5 MCG/ACT Aero  Commonly known as: SYMBICORT  TAKE 2 PUFFS BY MOUTH TWICE A DAY busPIRone 5 MG tablet  Commonly known as: BUSPAR  Take 1 tablet by mouth 2 times daily     calcium carbonate 500 MG Tabs tablet  Commonly known as: OSCAL     dilTIAZem 120 MG extended release capsule  Commonly known as: CARDIZEM CD  Take 1 capsule by mouth 2 times daily     hydrOXYzine 25 MG capsule  Commonly known as: VISTARIL  TAKE 1 CAPSULE BY MOUTH DAILY AS NEEDED FOR ANXIETY     levothyroxine 125 MCG tablet  Commonly known as: SYNTHROID     montelukast 10 MG tablet  Commonly known as: SINGULAIR  TAKE 1 TABLET BY MOUTH EVERY DAY AT NIGHT     MULTIPLE VITAMIN PO     predniSONE 20 MG tablet  Commonly known as: Lavonna Roberta  Ask about: Should I take this medication? * promethazine-dextromethorphan 6.25-15 MG/5ML syrup  Commonly known as: PROMETHAZINE-DM     * promethazine-dextromethorphan 6.25-15 MG/5ML syrup  Commonly known as: PROMETHAZINE-DM  Take 5 mLs by mouth 4 times daily as needed for Cough  Ask about: Should I take this medication?     traZODone 50 MG tablet  Commonly known as: DESYREL  Take 1 tablet by mouth nightly         * This list has 2 medication(s) that are the same as other medications prescribed for you. Read the directions carefully, and ask your doctor or other care provider to review them with you. Objective Findings at Discharge:   BP 94/68   Pulse 105   Temp 97.5 °F (36.4 °C) (Axillary)   Resp 22   Ht 5' 5\" (1.651 m)   Wt 158 lb 4.6 oz (71.8 kg)   SpO2 (!) 60%   BMI 26.34 kg/m²            PHYSICAL EXAM      Time of death per chart    BMP/CBC  No results for input(s): NA, K, CL, CO2, BUN, CREATININE, GLU, WBC, HEMOGLOBIN, HCT, PLT in the last 72 hours.       Electronically signed by Jose Perkins MD on 2022 at 2:00 PM

## 2023-12-13 NOTE — PROGRESS NOTES
12/23/21 1013   Vent Information   $Ventilation $Subsequent Day   Vent Type 980   Vent Mode AC/PC   Pressure Ordered 15   Rate Set 22 bmp   FiO2  90 %   SpO2 91 %   SpO2/FiO2 ratio 101.11   Sensitivity 3   PEEP/CPAP 8   Humidification Source HME   Nitric Oxide/Epoprostenol In Use? No   Vent Patient Data   Peak Inspiratory Pressure 26 cmH2O   Mean Airway Pressure 16 cmH20   Rate Measured 22 br/min   Vt Exhaled 880 mL   Minute Volume 19.3 Liters   I:E Ratio 1:1.2   Plateau Pressure 27 LGG87   Static Compliance 36 mL/cmH2O   Total PEEP 8.1 cmH20   Auto PEEP 0 cmH20   Cough/Sputum   Sputum How Obtained Endotracheal;Suctioned   $Obtained Sample $Induced Sputum   Cough Non-productive   Frequency Infrequent   Sputum Amount None   Sputum Color None   Tenacity None   Breath Sounds   Right Upper Lobe Clear   Right Middle Lobe Clear   Right Lower Lobe Diminished   Left Upper Lobe Clear   Left Lower Lobe Diminished   Additional Respiratory  Assessments   Position Semi-Holm's   Alarm Settings   High Pressure Alarm 40 cmH2O   Delay Alarm 20 sec(s)   Low Minute Volume Alarm 2.5 L/min   Apnea (secs) 20 secs   High Respiratory Rate 40 br/min   Low Exhaled Vt  250 mL   ETT (adult)   Placement Date/Time: 12/16/21 (c) 5355   Preoxygenation: Yes  Mask Ventilation: Mask ventilation not attempted (0)  Technique: Video laryngoscopy  Tube Size: 7.5 mm  Laryngoscope: (c) Mac  Blade Size: 3  Location: Oral  Insertion attempts: 1  [place]. ..    Secured at 25 cm   Measured From Lips   ET Placement Right   Secured By Commercial tube wren   Site Condition Dry Yes